# Patient Record
Sex: FEMALE | Race: WHITE | Employment: OTHER | URBAN - NONMETROPOLITAN AREA
[De-identification: names, ages, dates, MRNs, and addresses within clinical notes are randomized per-mention and may not be internally consistent; named-entity substitution may affect disease eponyms.]

---

## 2017-04-26 ENCOUNTER — TELEPHONE (OUTPATIENT)
Dept: PULMONOLOGY | Age: 82
End: 2017-04-26

## 2017-05-16 RX ORDER — APIXABAN 5 MG/1
TABLET, FILM COATED ORAL
Qty: 180 TABLET | Refills: 0 | Status: SHIPPED | OUTPATIENT
Start: 2017-05-16 | End: 2017-08-14 | Stop reason: SDUPTHER

## 2017-06-29 ENCOUNTER — OFFICE VISIT (OUTPATIENT)
Dept: CARDIOLOGY | Age: 82
End: 2017-06-29

## 2017-06-29 ENCOUNTER — PROCEDURE VISIT (OUTPATIENT)
Dept: CARDIOLOGY | Age: 82
End: 2017-06-29

## 2017-06-29 VITALS
BODY MASS INDEX: 22.2 KG/M2 | SYSTOLIC BLOOD PRESSURE: 138 MMHG | HEIGHT: 64 IN | HEART RATE: 76 BPM | DIASTOLIC BLOOD PRESSURE: 60 MMHG | WEIGHT: 130 LBS

## 2017-06-29 DIAGNOSIS — Z95.0 PACEMAKER: ICD-10-CM

## 2017-06-29 DIAGNOSIS — I25.10 ASHD (ARTERIOSCLEROTIC HEART DISEASE): Primary | ICD-10-CM

## 2017-06-29 DIAGNOSIS — I25.10 CORONARY ARTERY DISEASE INVOLVING NATIVE CORONARY ARTERY OF NATIVE HEART WITHOUT ANGINA PECTORIS: ICD-10-CM

## 2017-06-29 DIAGNOSIS — I48.92 ATRIAL FLUTTER, PAROXYSMAL (HCC): ICD-10-CM

## 2017-06-29 DIAGNOSIS — Z95.0 PACEMAKER: Primary | ICD-10-CM

## 2017-06-29 PROCEDURE — 1090F PRES/ABSN URINE INCON ASSESS: CPT | Performed by: INTERNAL MEDICINE

## 2017-06-29 PROCEDURE — 1036F TOBACCO NON-USER: CPT | Performed by: INTERNAL MEDICINE

## 2017-06-29 PROCEDURE — 93280 PM DEVICE PROGR EVAL DUAL: CPT | Performed by: INTERNAL MEDICINE

## 2017-06-29 PROCEDURE — G8598 ASA/ANTIPLAT THER USED: HCPCS | Performed by: INTERNAL MEDICINE

## 2017-06-29 PROCEDURE — G8420 CALC BMI NORM PARAMETERS: HCPCS | Performed by: INTERNAL MEDICINE

## 2017-06-29 PROCEDURE — G8400 PT W/DXA NO RESULTS DOC: HCPCS | Performed by: INTERNAL MEDICINE

## 2017-06-29 PROCEDURE — 99213 OFFICE O/P EST LOW 20 MIN: CPT | Performed by: INTERNAL MEDICINE

## 2017-06-29 PROCEDURE — 1123F ACP DISCUSS/DSCN MKR DOCD: CPT | Performed by: INTERNAL MEDICINE

## 2017-06-29 PROCEDURE — 4040F PNEUMOC VAC/ADMIN/RCVD: CPT | Performed by: INTERNAL MEDICINE

## 2017-06-29 PROCEDURE — G8427 DOCREV CUR MEDS BY ELIG CLIN: HCPCS | Performed by: INTERNAL MEDICINE

## 2017-08-02 ENCOUNTER — OFFICE VISIT (OUTPATIENT)
Dept: PULMONOLOGY | Age: 82
End: 2017-08-02
Payer: MEDICARE

## 2017-08-02 VITALS
BODY MASS INDEX: 22.36 KG/M2 | OXYGEN SATURATION: 97 % | HEART RATE: 78 BPM | SYSTOLIC BLOOD PRESSURE: 122 MMHG | HEIGHT: 64 IN | DIASTOLIC BLOOD PRESSURE: 68 MMHG | TEMPERATURE: 97.9 F | WEIGHT: 131 LBS

## 2017-08-02 DIAGNOSIS — J47.9 BRONCHIECTASIS WITHOUT COMPLICATION (HCC): Primary | ICD-10-CM

## 2017-08-02 PROCEDURE — 99205 OFFICE O/P NEW HI 60 MIN: CPT | Performed by: INTERNAL MEDICINE

## 2017-08-02 PROCEDURE — 1090F PRES/ABSN URINE INCON ASSESS: CPT | Performed by: INTERNAL MEDICINE

## 2017-08-02 PROCEDURE — G8420 CALC BMI NORM PARAMETERS: HCPCS | Performed by: INTERNAL MEDICINE

## 2017-08-02 PROCEDURE — 1123F ACP DISCUSS/DSCN MKR DOCD: CPT | Performed by: INTERNAL MEDICINE

## 2017-08-02 PROCEDURE — G8598 ASA/ANTIPLAT THER USED: HCPCS | Performed by: INTERNAL MEDICINE

## 2017-08-02 PROCEDURE — 4040F PNEUMOC VAC/ADMIN/RCVD: CPT | Performed by: INTERNAL MEDICINE

## 2017-08-02 PROCEDURE — G8400 PT W/DXA NO RESULTS DOC: HCPCS | Performed by: INTERNAL MEDICINE

## 2017-08-02 PROCEDURE — 1036F TOBACCO NON-USER: CPT | Performed by: INTERNAL MEDICINE

## 2017-08-02 PROCEDURE — G8427 DOCREV CUR MEDS BY ELIG CLIN: HCPCS | Performed by: INTERNAL MEDICINE

## 2017-08-02 ASSESSMENT — ENCOUNTER SYMPTOMS
CHEST TIGHTNESS: 0
ABDOMINAL DISTENTION: 0
SHORTNESS OF BREATH: 1
SPUTUM PRODUCTION: 1
COUGH: 1
HEMOPTYSIS: 0
WHEEZING: 0

## 2017-08-07 ENCOUNTER — TELEPHONE (OUTPATIENT)
Dept: PULMONOLOGY | Age: 82
End: 2017-08-07

## 2017-08-14 RX ORDER — APIXABAN 5 MG/1
TABLET, FILM COATED ORAL
Qty: 180 TABLET | Refills: 1 | Status: SHIPPED | OUTPATIENT
Start: 2017-08-14 | End: 2018-02-10 | Stop reason: SDUPTHER

## 2017-09-08 ENCOUNTER — TELEPHONE (OUTPATIENT)
Dept: PULMONOLOGY | Age: 82
End: 2017-09-08

## 2017-11-01 ENCOUNTER — TELEPHONE (OUTPATIENT)
Dept: CARDIOLOGY CLINIC | Age: 82
End: 2017-11-01

## 2017-11-01 DIAGNOSIS — R51.9 WORSENING HEADACHES: Primary | ICD-10-CM

## 2017-12-06 ENCOUNTER — OFFICE VISIT (OUTPATIENT)
Dept: PULMONOLOGY | Age: 82
End: 2017-12-06
Payer: MEDICARE

## 2017-12-06 ENCOUNTER — TELEPHONE (OUTPATIENT)
Dept: PULMONOLOGY | Age: 82
End: 2017-12-06

## 2017-12-06 VITALS
OXYGEN SATURATION: 97 % | WEIGHT: 134.6 LBS | BODY MASS INDEX: 22.98 KG/M2 | SYSTOLIC BLOOD PRESSURE: 118 MMHG | HEIGHT: 64 IN | TEMPERATURE: 98 F | DIASTOLIC BLOOD PRESSURE: 72 MMHG | HEART RATE: 80 BPM

## 2017-12-06 DIAGNOSIS — I27.20 PULMONARY HYPERTENSION (HCC): ICD-10-CM

## 2017-12-06 DIAGNOSIS — J42 CHRONIC BRONCHITIS, UNSPECIFIED CHRONIC BRONCHITIS TYPE (HCC): ICD-10-CM

## 2017-12-06 DIAGNOSIS — G25.81 RLS (RESTLESS LEGS SYNDROME): ICD-10-CM

## 2017-12-06 DIAGNOSIS — J43.9 PULMONARY EMPHYSEMA, UNSPECIFIED EMPHYSEMA TYPE (HCC): Primary | ICD-10-CM

## 2017-12-06 DIAGNOSIS — I49.5 SSS (SICK SINUS SYNDROME) (HCC): ICD-10-CM

## 2017-12-06 DIAGNOSIS — I25.10 CORONARY ARTERY DISEASE INVOLVING NATIVE HEART WITHOUT ANGINA PECTORIS, UNSPECIFIED VESSEL OR LESION TYPE: ICD-10-CM

## 2017-12-06 DIAGNOSIS — J47.9 BRONCHIECTASIS WITHOUT COMPLICATION (HCC): Primary | ICD-10-CM

## 2017-12-06 PROCEDURE — G0009 ADMIN PNEUMOCOCCAL VACCINE: HCPCS | Performed by: NURSE PRACTITIONER

## 2017-12-06 PROCEDURE — 99214 OFFICE O/P EST MOD 30 MIN: CPT | Performed by: NURSE PRACTITIONER

## 2017-12-06 PROCEDURE — 90670 PCV13 VACCINE IM: CPT | Performed by: NURSE PRACTITIONER

## 2017-12-06 RX ORDER — IPRATROPIUM BROMIDE AND ALBUTEROL SULFATE 2.5; .5 MG/3ML; MG/3ML
1 SOLUTION RESPIRATORY (INHALATION) EVERY 6 HOURS PRN
Qty: 360 ML | Refills: 5 | Status: SHIPPED | OUTPATIENT
Start: 2017-12-06 | End: 2019-01-29 | Stop reason: ALTCHOICE

## 2017-12-06 RX ORDER — IPRATROPIUM BROMIDE AND ALBUTEROL SULFATE 2.5; .5 MG/3ML; MG/3ML
1 SOLUTION RESPIRATORY (INHALATION) EVERY 6 HOURS PRN
Qty: 360 ML | Refills: 5 | Status: SHIPPED
Start: 2017-12-06 | End: 2017-12-06 | Stop reason: SDUPTHER

## 2017-12-06 NOTE — PROGRESS NOTES
After obtaining consent, and per orders of Dr. Christopher Wright  injection of Prevnar 13 given in left deltoid by [facility]. Patient instructed to remain in clinic for 20 minutes afterwards, and to report any adverse reaction to me immediately.

## 2017-12-06 NOTE — PROGRESS NOTES
[facility]    Patient: Cordell Aaron, 80 y.o.   : 1935  2017    Pt of Dr. Jazmin Jeffrey   Patient presents with    Shortness of Breath     bronchiectasis 4 month f/u no tests         HPI  Michelle Chandler is here for follow up for bronchiectasis. She was previously seen by Dr. Mynor Kraus in August with recommendations to start [place] with continuation of Pulmicort and Combivent PRN. She had great benefit from [place] but could not continue because of out of pocket expense. She was placed on Advair and stopped Pulmicort by her PCP and continues to use Combivent once per day. She has no response with Advair, but notable improvement in her SOB with Combivent. She does continue to have daily cough, productive of clear sputum. She has required no ATB coverage. Denies fever/chills, hemoptysis, night sweats. She has not required home oxygen. She is not up to date on pneumonia vaccination and can not take flu shot because of egg allergy. Progress History:   Since last visit any new medical issues? No  New ER or hospitlal visits? No  Any new or changes in medicines? No  Using inhalers? Yes   Are they helpful? Yes Combivent is, Advair is not    Past Medical hx   PMH:  Past Medical History:   Diagnosis Date    Anxiety     Bronchiectasis (HCC)     Chronic obstructive lung disease     Depression     Epigastric discomfort     Lower substernal and epigastric discomfort, etiology uncertain (probably not cardiac.)     Exertional dyspnea     Patient has considerable exertional dyspnea from her obstructive lung disease.  GERD (gastroesophageal reflux disease)     Hernia     History of pneumonia     History of freqeunt episodes of pneumonia as a child.     Irregular heart beat     Joint pain     Pacemaker     S/P dual-chamber [place] pacemaker implantation (symptomatic bradycardia probably related to AV block.)     Restless legs syndrome     SSS (sick sinus syndrome) ([facility] [address]      SURGICAL HISTORY:  Past Surgical History:   Procedure Laterality Date    CARDIAC PACEMAKER PLACEMENT  2001    CARDIAC PACEMAKER PLACEMENT  4 13 2010    Dual-chamber pulse generator removal. Attempted extraction of atrial and ventricular leads. Insertion of new ventricular lead. Insertion of new dual-chamber pulse generator (atrial lead capped.)     CARDIOVASCULAR STRESS TEST  8 23 2010    Gated study showed normal LV function. EF is 64%. Iosotope scan in short axis showed homogeneous uptake. Mild anterior wall attenuation in breasts but no reversibility to suggest ischemia. TID was normal at 0.90 and ischemic score was 0. No ischemia or infarction.  CORONARY ANGIOPLASTY WITH STENT PLACEMENT  11/2011    Dr. Federico Pagan CATH LAB PROCEDURE  8 02 2006    It is right dominant coronary supply. LV was performed. The EF estimated is 60% with mild inferior wall hypokinesis. LV pressure is 139/6 mmHg, LVEDP 23 mmHg, aortic pressure of 138/71 mmHg. There was no gradient across the aortic valve. There was a suggestion of increased wall thickness of the ventricle.  JOINT REPLACEMENT      left knee    TRANSTHORACIC ECHOCARDIOGRAM  8 01 2011    Size was normal. Systolic function was normal. EF was estimated in the range of 55-65%. There were no regional wall motion abnormalities. Wall thickness was normal. Doppler - LV diastolic function parameters were normal. RV was mildly dilated. There was mild mitral valve annular calcification and mild MR.      SOCIAL HISTORY:  Social History   Substance Use Topics    Smoking status: Never Smoker    Smokeless tobacco: Never Used    Alcohol use Yes      Comment: Patient states, \"she drinks beer. \" Tuesday     ALLERGIES:  Allergies   Allergen Reactions    Eggs Or Egg-Derived Products     Quinine Derivatives      FAMILY HISTORY:  Family History   Problem Relation Age of Onset    Cancer Mother      CURRENT MEDICATIONS:  Current Outpatient Prescriptions   Medication Sig Dispense Refill    albuterol-ipratropium (COMBIVENT RESPIMAT)  MCG/ACT AERS inhaler Inhale 1 puff into the lungs every 6 hours as needed 3 Inhaler 3    ipratropium-albuterol (DUONEB) 0.5-2.5 (3) MG/3ML SOLN nebulizer solution Inhale 3 mLs into the lungs every 6 hours as needed for Shortness of Breath 360 mL 5    Glycopyrrolate-Formoterol 9-4.8 MCG/ACT AERO Inhale 2 puffs into the lungs 2 times daily 1 Inhaler 11    ELIQUIS 5 MG TABS tablet TAKE 1 TABLET TWICE A  tablet 1    diclofenac (VOLTAREN) 75 MG EC tablet Take 75 mg by mouth 2 times daily.  sertraline (ZOLOFT) 100 MG tablet Take 100 mg by mouth daily.  amitriptyline (ELAVIL) 25 MG tablet Take 1 tablet by mouth nightly. 30 tablet 3    clopidogrel (PLAVIX) 75 MG tablet Take 75 mg by mouth daily.  ropinirole (REQUIP) 0.25 MG tablet Take 1 mg by mouth nightly.  Multiple Vitamins-Minerals (PRESERVISION AREDS PO) Take  by mouth daily.  aspirin 81 MG EC tablet Take 81 mg by mouth daily.  furosemide (LASIX) 40 MG tablet Take 40 mg by mouth daily as needed. No current facility-administered medications for this visit. Alfredito Eligio ROS   General/Constitutional: No recent loss of weight or appetite changes. No fever or chills. HENT: Negative. Eyes: Negative. Upper respiratory tract: No nasal stuffiness or post nasal drip. Lower respiratory tract/ lungs: Frequent cough with moderate clear sputum production. No hemoptysis. Cardiovascular: No palpitations or chest pain. Gastrointestinal: No nausea or vomiting. Neurological: No focal neurologiacal weakness. Extremities: No edema. Musculoskeletal: No complaints. Genitourinary: No complaints. Hematological: Negative. Psychiatric/Behavioral: Negative. Skin: No itching.      Physical exam   /72   Pulse 80   Temp 98 °F (36.7 °C)   Ht 5' 4\" (1.626 m)   Wt 134 lb 9.6 oz (61.1 kg)   SpO2 97% Comment: room air at rest  BMI 23.10 kg/m²      Physical Exam   Constitutional: Patient appears moderately built and moderately nourished. In no acute distress. Head: Normocephalic and atraumatic. Neck: Neck supple. No tracheal deviation present. Cardiovascular: Regular rate, regular rhythm, S1 and S2 with no murmur. No peripheral edema. Pulmonary/Chest: Normal effort with diminished breath sounds throughout, fine rales to right base. No stridor. No respiratory distress. Abdominal: Soft. No distension or tenderness to palpation. Musculoskeletal: Moves all extremities  Neurological: Patient is alert and oriented to person, place, and time. No focal deficits. Skin: Warm and dry. No clubbing or cyanosis. Test results   Lung Nodule Screening     [] Qualifies    [x] Does not qualify   [] Declined    [] Completed     Echo 7/10/17     Ejection fraction is visually estimated at 08%.   Systolic function was normal.   Normal right ventricular size and function.   Right ventricular systolic pressure of 44 mm Hg consistent with mild   pulmonary hypertension     CXR 1/30/17     1. There is stable left infrahilar/left basilar bronchiectasis and chronic infiltrate. 2. There is no new consolidation or new infiltrate. 3. There is stable mild pleural reaction at the left lateral costophrenic angle and new mild pleural reaction at the right lateral costophrenic angle. 4. There is no pulmonary vascular congestion.                 7/2012 - mild bronchiectasis                        Assessment     1. Bronchiectasis without complication ([address]     2. RLS (restless legs syndrome)     3. Chronic bronchitis, unspecified chronic bronchitis type ([address]     4. Pulmonary hypertension      Mild   5. Coronary artery disease involving native heart without angina pectoris, unspecified vessel or lesion type     6. SSS (sick sinus syndrome) ([address]           Plan   She did well with Bevespi, will resume.  Will provide samples and arrange for medication assistance through [facility]) program  Stop Advair  Consider adding ICS at next visit if needed  Give Prevnar 13  She may need repeat PPSV23 in 1 year      Will see John Hyman back in: 6 months    Electronically signed by Jena Barr CNP on 12/6/2017 at 1:39 PM'

## 2017-12-07 ENCOUNTER — TELEPHONE (OUTPATIENT)
Dept: PULMONOLOGY | Age: 82
End: 2017-12-07

## 2017-12-07 NOTE — TELEPHONE ENCOUNTER
Called and LM for Donnis Simmonds per your request  informing her that the pt is in need of medication assistance for Bevespi, and to COB if there is anything else that she needs regarding this to best assist the pt.  Order for Duoneb faxed to [facility] on 12/6/17 per pt's request.

## 2018-02-12 RX ORDER — APIXABAN 5 MG/1
TABLET, FILM COATED ORAL
Qty: 180 TABLET | Refills: 0 | Status: SHIPPED | OUTPATIENT
Start: 2018-02-12 | End: 2018-05-02 | Stop reason: SDUPTHER

## 2018-05-02 ENCOUNTER — NURSE ONLY (OUTPATIENT)
Dept: CARDIOLOGY CLINIC | Age: 83
End: 2018-05-02
Payer: MEDICARE

## 2018-05-02 ENCOUNTER — OFFICE VISIT (OUTPATIENT)
Dept: CARDIOLOGY CLINIC | Age: 83
End: 2018-05-02
Payer: MEDICARE

## 2018-05-02 VITALS
BODY MASS INDEX: 22.79 KG/M2 | WEIGHT: 128.6 LBS | HEART RATE: 85 BPM | SYSTOLIC BLOOD PRESSURE: 142 MMHG | DIASTOLIC BLOOD PRESSURE: 62 MMHG | HEIGHT: 63 IN

## 2018-05-02 DIAGNOSIS — R06.09 DOE (DYSPNEA ON EXERTION): ICD-10-CM

## 2018-05-02 DIAGNOSIS — Z95.0 PACEMAKER: Primary | ICD-10-CM

## 2018-05-02 DIAGNOSIS — I48.92 ATRIAL FLUTTER, PAROXYSMAL (HCC): Primary | ICD-10-CM

## 2018-05-02 DIAGNOSIS — I25.10 CORONARY ARTERY DISEASE INVOLVING NATIVE CORONARY ARTERY OF NATIVE HEART WITHOUT ANGINA PECTORIS: ICD-10-CM

## 2018-05-02 PROCEDURE — 1090F PRES/ABSN URINE INCON ASSESS: CPT | Performed by: INTERNAL MEDICINE

## 2018-05-02 PROCEDURE — 93000 ELECTROCARDIOGRAM COMPLETE: CPT | Performed by: INTERNAL MEDICINE

## 2018-05-02 PROCEDURE — G8428 CUR MEDS NOT DOCUMENT: HCPCS | Performed by: INTERNAL MEDICINE

## 2018-05-02 PROCEDURE — 93280 PM DEVICE PROGR EVAL DUAL: CPT | Performed by: INTERNAL MEDICINE

## 2018-05-02 PROCEDURE — G8400 PT W/DXA NO RESULTS DOC: HCPCS | Performed by: INTERNAL MEDICINE

## 2018-05-02 PROCEDURE — G8598 ASA/ANTIPLAT THER USED: HCPCS | Performed by: INTERNAL MEDICINE

## 2018-05-02 PROCEDURE — 1123F ACP DISCUSS/DSCN MKR DOCD: CPT | Performed by: INTERNAL MEDICINE

## 2018-05-02 PROCEDURE — 99214 OFFICE O/P EST MOD 30 MIN: CPT | Performed by: INTERNAL MEDICINE

## 2018-05-02 PROCEDURE — G8420 CALC BMI NORM PARAMETERS: HCPCS | Performed by: INTERNAL MEDICINE

## 2018-05-02 PROCEDURE — 4040F PNEUMOC VAC/ADMIN/RCVD: CPT | Performed by: INTERNAL MEDICINE

## 2018-05-02 PROCEDURE — 1036F TOBACCO NON-USER: CPT | Performed by: INTERNAL MEDICINE

## 2018-05-02 RX ORDER — AMLODIPINE BESYLATE 5 MG/1
5 TABLET ORAL DAILY
Qty: 30 TABLET | Refills: 3 | Status: ON HOLD | OUTPATIENT
Start: 2018-05-02 | End: 2018-05-13

## 2018-05-02 RX ORDER — OMEPRAZOLE 20 MG/1
20 CAPSULE, DELAYED RELEASE ORAL DAILY
Status: ON HOLD | COMMUNITY
End: 2018-05-13 | Stop reason: HOSPADM

## 2018-05-02 ASSESSMENT — ENCOUNTER SYMPTOMS
SORE THROAT: 0
NAUSEA: 0
EYE DISCHARGE: 0
EYE REDNESS: 0
BLOOD IN STOOL: 0
APNEA: 0
SINUS PRESSURE: 0
CONSTIPATION: 0
EYE PAIN: 0
EYE ITCHING: 0
ABDOMINAL PAIN: 0
DIARRHEA: 0
ABDOMINAL DISTENTION: 0
SHORTNESS OF BREATH: 1
COUGH: 0
BACK PAIN: 0
CHEST TIGHTNESS: 0

## 2018-05-10 ENCOUNTER — APPOINTMENT (OUTPATIENT)
Dept: GENERAL RADIOLOGY | Age: 83
DRG: 247 | End: 2018-05-10
Payer: MEDICARE

## 2018-05-10 ENCOUNTER — HOSPITAL ENCOUNTER (INPATIENT)
Age: 83
LOS: 3 days | Discharge: HOME | DRG: 247 | End: 2018-05-13
Attending: INTERNAL MEDICINE | Admitting: INTERNAL MEDICINE
Payer: MEDICARE

## 2018-05-10 DIAGNOSIS — R77.8 ELEVATED TROPONIN: ICD-10-CM

## 2018-05-10 DIAGNOSIS — I20.0 UNSTABLE ANGINA PECTORIS (HCC): Primary | ICD-10-CM

## 2018-05-10 PROBLEM — I25.10 ASHD (ARTERIOSCLEROTIC HEART DISEASE): Status: ACTIVE | Noted: 2018-05-10

## 2018-05-10 LAB
ALBUMIN SERPL-MCNC: 3.9 G/DL (ref 3.5–5.1)
ALP BLD-CCNC: 64 U/L (ref 38–126)
ALT SERPL-CCNC: 11 U/L (ref 11–66)
ANION GAP SERPL CALCULATED.3IONS-SCNC: 11 MEQ/L (ref 8–16)
ANISOCYTOSIS: ABNORMAL
AST SERPL-CCNC: 29 U/L (ref 5–40)
BASOPHILS # BLD: 0.2 %
BASOPHILS ABSOLUTE: 0 THOU/MM3 (ref 0–0.1)
BILIRUB SERPL-MCNC: 0.3 MG/DL (ref 0.3–1.2)
BUN BLDV-MCNC: 21 MG/DL (ref 7–22)
CALCIUM SERPL-MCNC: 9.4 MG/DL (ref 8.5–10.5)
CHLORIDE BLD-SCNC: 99 MEQ/L (ref 98–111)
CO2: 28 MEQ/L (ref 23–33)
CREAT SERPL-MCNC: 0.7 MG/DL (ref 0.4–1.2)
EKG ATRIAL RATE: 78 BPM
EKG ATRIAL RATE: 78 BPM
EKG P AXIS: 71 DEGREES
EKG P AXIS: 73 DEGREES
EKG P-R INTERVAL: 174 MS
EKG P-R INTERVAL: 178 MS
EKG Q-T INTERVAL: 458 MS
EKG Q-T INTERVAL: 460 MS
EKG QRS DURATION: 152 MS
EKG QRS DURATION: 154 MS
EKG QTC CALCULATION (BAZETT): 522 MS
EKG QTC CALCULATION (BAZETT): 524 MS
EKG R AXIS: -30 DEGREES
EKG R AXIS: -34 DEGREES
EKG T AXIS: 110 DEGREES
EKG T AXIS: 133 DEGREES
EKG VENTRICULAR RATE: 78 BPM
EKG VENTRICULAR RATE: 78 BPM
EOSINOPHIL # BLD: 2.3 %
EOSINOPHILS ABSOLUTE: 0.2 THOU/MM3 (ref 0–0.4)
GFR SERPL CREATININE-BSD FRML MDRD: 80 ML/MIN/1.73M2
GLUCOSE BLD-MCNC: 176 MG/DL (ref 70–108)
GLUCOSE BLD-MCNC: 97 MG/DL (ref 70–108)
HCT VFR BLD CALC: 38.8 % (ref 37–47)
HEMOGLOBIN: 13 GM/DL (ref 12–16)
LYMPHOCYTES # BLD: 12 %
LYMPHOCYTES ABSOLUTE: 1.3 THOU/MM3 (ref 1–4.8)
MCH RBC QN AUTO: 30.6 PG (ref 27–31)
MCHC RBC AUTO-ENTMCNC: 33.4 GM/DL (ref 33–37)
MCV RBC AUTO: 91.6 FL (ref 81–99)
MONOCYTES # BLD: 7.2 %
MONOCYTES ABSOLUTE: 0.8 THOU/MM3 (ref 0.4–1.3)
NUCLEATED RED BLOOD CELLS: 0 /100 WBC
OSMOLALITY CALCULATION: 278.6 MOSMOL/KG (ref 275–300)
PDW BLD-RTO: 15 % (ref 11.5–14.5)
PLATELET # BLD: 261 THOU/MM3 (ref 130–400)
PMV BLD AUTO: 7.8 FL (ref 7.4–10.4)
POTASSIUM SERPL-SCNC: 4 MEQ/L (ref 3.5–5.2)
RBC # BLD: 4.24 MILL/MM3 (ref 4.2–5.4)
SEG NEUTROPHILS: 78.3 %
SEGMENTED NEUTROPHILS ABSOLUTE COUNT: 8.4 THOU/MM3 (ref 1.8–7.7)
SODIUM BLD-SCNC: 138 MEQ/L (ref 135–145)
TOTAL PROTEIN: 7.4 G/DL (ref 6.1–8)
TROPONIN T: 0.12 NG/ML
TROPONIN T: 0.16 NG/ML
TROPONIN T: 0.18 NG/ML
WBC # BLD: 10.7 THOU/MM3 (ref 4.8–10.8)

## 2018-05-10 PROCEDURE — 71046 X-RAY EXAM CHEST 2 VIEWS: CPT

## 2018-05-10 PROCEDURE — 2580000003 HC RX 258: Performed by: INTERNAL MEDICINE

## 2018-05-10 PROCEDURE — 99285 EMERGENCY DEPT VISIT HI MDM: CPT

## 2018-05-10 PROCEDURE — 6370000000 HC RX 637 (ALT 250 FOR IP): Performed by: PHYSICIAN ASSISTANT

## 2018-05-10 PROCEDURE — 86850 RBC ANTIBODY SCREEN: CPT

## 2018-05-10 PROCEDURE — 85025 COMPLETE CBC W/AUTO DIFF WBC: CPT

## 2018-05-10 PROCEDURE — 80053 COMPREHEN METABOLIC PANEL: CPT

## 2018-05-10 PROCEDURE — 84484 ASSAY OF TROPONIN QUANT: CPT

## 2018-05-10 PROCEDURE — 99223 1ST HOSP IP/OBS HIGH 75: CPT | Performed by: INTERNAL MEDICINE

## 2018-05-10 PROCEDURE — 2500000003 HC RX 250 WO HCPCS: Performed by: INTERNAL MEDICINE

## 2018-05-10 PROCEDURE — 86901 BLOOD TYPING SEROLOGIC RH(D): CPT

## 2018-05-10 PROCEDURE — 6370000000 HC RX 637 (ALT 250 FOR IP): Performed by: INTERNAL MEDICINE

## 2018-05-10 PROCEDURE — 93010 ELECTROCARDIOGRAM REPORT: CPT | Performed by: INTERNAL MEDICINE

## 2018-05-10 PROCEDURE — 36415 COLL VENOUS BLD VENIPUNCTURE: CPT

## 2018-05-10 PROCEDURE — 86900 BLOOD TYPING SEROLOGIC ABO: CPT

## 2018-05-10 PROCEDURE — 2140000000 HC CCU INTERMEDIATE R&B

## 2018-05-10 PROCEDURE — 93005 ELECTROCARDIOGRAM TRACING: CPT | Performed by: PHYSICIAN ASSISTANT

## 2018-05-10 PROCEDURE — 93005 ELECTROCARDIOGRAM TRACING: CPT | Performed by: INTERNAL MEDICINE

## 2018-05-10 PROCEDURE — 6360000002 HC RX W HCPCS: Performed by: INTERNAL MEDICINE

## 2018-05-10 PROCEDURE — 82948 REAGENT STRIP/BLOOD GLUCOSE: CPT

## 2018-05-10 RX ORDER — AMLODIPINE BESYLATE 5 MG/1
5 TABLET ORAL DAILY
Status: DISCONTINUED | OUTPATIENT
Start: 2018-05-10 | End: 2018-05-13 | Stop reason: HOSPADM

## 2018-05-10 RX ORDER — ASPIRIN 81 MG/1
324 TABLET, CHEWABLE ORAL ONCE
Status: COMPLETED | OUTPATIENT
Start: 2018-05-10 | End: 2018-05-10

## 2018-05-10 RX ORDER — ASPIRIN 81 MG/1
81 TABLET, CHEWABLE ORAL DAILY
Status: DISCONTINUED | OUTPATIENT
Start: 2018-05-11 | End: 2018-05-10

## 2018-05-10 RX ORDER — ASPIRIN 81 MG/1
81 TABLET ORAL DAILY
Status: DISCONTINUED | OUTPATIENT
Start: 2018-05-10 | End: 2018-05-13 | Stop reason: HOSPADM

## 2018-05-10 RX ORDER — NITROGLYCERIN 0.4 MG/1
0.4 TABLET SUBLINGUAL EVERY 5 MIN PRN
Status: DISCONTINUED | OUTPATIENT
Start: 2018-05-10 | End: 2018-05-11

## 2018-05-10 RX ORDER — SODIUM CHLORIDE 9 MG/ML
INJECTION, SOLUTION INTRAVENOUS CONTINUOUS
Status: DISCONTINUED | OUTPATIENT
Start: 2018-05-10 | End: 2018-05-11

## 2018-05-10 RX ORDER — IPRATROPIUM BROMIDE AND ALBUTEROL SULFATE 2.5; .5 MG/3ML; MG/3ML
1 SOLUTION RESPIRATORY (INHALATION) EVERY 6 HOURS PRN
Status: DISCONTINUED | OUTPATIENT
Start: 2018-05-10 | End: 2018-05-13 | Stop reason: HOSPADM

## 2018-05-10 RX ORDER — MORPHINE SULFATE 2 MG/ML
2 INJECTION, SOLUTION INTRAMUSCULAR; INTRAVENOUS EVERY 4 HOURS PRN
Status: DISCONTINUED | OUTPATIENT
Start: 2018-05-10 | End: 2018-05-13 | Stop reason: HOSPADM

## 2018-05-10 RX ORDER — SODIUM CHLORIDE 0.9 % (FLUSH) 0.9 %
10 SYRINGE (ML) INJECTION PRN
Status: DISCONTINUED | OUTPATIENT
Start: 2018-05-10 | End: 2018-05-13 | Stop reason: HOSPADM

## 2018-05-10 RX ORDER — NITROGLYCERIN 20 MG/100ML
5 INJECTION INTRAVENOUS CONTINUOUS
Status: DISCONTINUED | OUTPATIENT
Start: 2018-05-10 | End: 2018-05-13 | Stop reason: HOSPADM

## 2018-05-10 RX ORDER — SODIUM CHLORIDE 0.9 % (FLUSH) 0.9 %
10 SYRINGE (ML) INJECTION EVERY 12 HOURS SCHEDULED
Status: DISCONTINUED | OUTPATIENT
Start: 2018-05-10 | End: 2018-05-13 | Stop reason: HOSPADM

## 2018-05-10 RX ORDER — PANTOPRAZOLE SODIUM 40 MG/1
40 TABLET, DELAYED RELEASE ORAL
Status: DISCONTINUED | OUTPATIENT
Start: 2018-05-11 | End: 2018-05-11

## 2018-05-10 RX ORDER — ROPINIROLE 1 MG/1
1 TABLET, FILM COATED ORAL NIGHTLY
Status: DISCONTINUED | OUTPATIENT
Start: 2018-05-10 | End: 2018-05-13 | Stop reason: HOSPADM

## 2018-05-10 RX ORDER — ONDANSETRON 2 MG/ML
4 INJECTION INTRAMUSCULAR; INTRAVENOUS EVERY 6 HOURS PRN
Status: DISCONTINUED | OUTPATIENT
Start: 2018-05-10 | End: 2018-05-11

## 2018-05-10 RX ORDER — HYDROCODONE BITARTRATE AND ACETAMINOPHEN 5; 325 MG/1; MG/1
1 TABLET ORAL EVERY 4 HOURS PRN
Status: DISCONTINUED | OUTPATIENT
Start: 2018-05-10 | End: 2018-05-13 | Stop reason: HOSPADM

## 2018-05-10 RX ORDER — ATORVASTATIN CALCIUM 40 MG/1
40 TABLET, FILM COATED ORAL NIGHTLY
Status: DISCONTINUED | OUTPATIENT
Start: 2018-05-10 | End: 2018-05-13 | Stop reason: HOSPADM

## 2018-05-10 RX ORDER — ACETAMINOPHEN 325 MG/1
650 TABLET ORAL EVERY 4 HOURS PRN
Status: DISCONTINUED | OUTPATIENT
Start: 2018-05-10 | End: 2018-05-11

## 2018-05-10 RX ORDER — CIPROFLOXACIN 750 MG/1
750 TABLET, FILM COATED ORAL 2 TIMES DAILY
COMMUNITY

## 2018-05-10 RX ORDER — SERTRALINE HYDROCHLORIDE 100 MG/1
100 TABLET, FILM COATED ORAL DAILY
Status: DISCONTINUED | OUTPATIENT
Start: 2018-05-10 | End: 2018-05-13 | Stop reason: HOSPADM

## 2018-05-10 RX ORDER — HYDROCODONE BITARTRATE AND ACETAMINOPHEN 5; 325 MG/1; MG/1
2 TABLET ORAL EVERY 4 HOURS PRN
Status: DISCONTINUED | OUTPATIENT
Start: 2018-05-10 | End: 2018-05-13 | Stop reason: HOSPADM

## 2018-05-10 RX ADMIN — ATORVASTATIN CALCIUM 40 MG: 40 TABLET, FILM COATED ORAL at 20:17

## 2018-05-10 RX ADMIN — NITROGLYCERIN 0.4 MG: 0.4 TABLET SUBLINGUAL at 08:09

## 2018-05-10 RX ADMIN — ROPINIROLE HYDROCHLORIDE 1 MG: 1 TABLET, FILM COATED ORAL at 20:17

## 2018-05-10 RX ADMIN — METOPROLOL TARTRATE 25 MG: 25 TABLET ORAL at 15:23

## 2018-05-10 RX ADMIN — ASPIRIN 81 MG 324 MG: 81 TABLET ORAL at 08:06

## 2018-05-10 RX ADMIN — AMLODIPINE BESYLATE 5 MG: 5 TABLET ORAL at 15:23

## 2018-05-10 RX ADMIN — SERTRALINE 100 MG: 100 TABLET, FILM COATED ORAL at 15:23

## 2018-05-10 RX ADMIN — ENOXAPARIN SODIUM 60 MG: 60 INJECTION SUBCUTANEOUS at 15:23

## 2018-05-10 RX ADMIN — METOPROLOL TARTRATE 25 MG: 25 TABLET ORAL at 20:17

## 2018-05-10 RX ADMIN — ONDANSETRON 4 MG: 2 INJECTION INTRAMUSCULAR; INTRAVENOUS at 23:05

## 2018-05-10 RX ADMIN — SODIUM CHLORIDE: 9 INJECTION, SOLUTION INTRAVENOUS at 15:25

## 2018-05-10 RX ADMIN — ASPIRIN 81 MG: 81 TABLET, COATED ORAL at 15:23

## 2018-05-10 RX ADMIN — NITROGLYCERIN 5 MCG: 20 INJECTION INTRAVENOUS at 15:24

## 2018-05-10 RX ADMIN — NITROGLYCERIN 0.5 INCH: 20 OINTMENT TOPICAL at 09:13

## 2018-05-10 ASSESSMENT — PAIN DESCRIPTION - PAIN TYPE
TYPE: ACUTE PAIN

## 2018-05-10 ASSESSMENT — ENCOUNTER SYMPTOMS
EYE DISCHARGE: 0
SORE THROAT: 0
ABDOMINAL PAIN: 0
SHORTNESS OF BREATH: 1
BACK PAIN: 0
DIARRHEA: 0
EYE PAIN: 0
COUGH: 0
NAUSEA: 0
WHEEZING: 0
VOMITING: 0
RHINORRHEA: 0

## 2018-05-10 ASSESSMENT — PAIN DESCRIPTION - LOCATION
LOCATION: JAW
LOCATION: CHEST

## 2018-05-10 ASSESSMENT — PAIN SCALES - GENERAL
PAINLEVEL_OUTOF10: 5
PAINLEVEL_OUTOF10: 2
PAINLEVEL_OUTOF10: 5
PAINLEVEL_OUTOF10: 0
PAINLEVEL_OUTOF10: 10

## 2018-05-11 LAB
ABO: NORMAL
ANTIBODY SCREEN: NORMAL
LV EF: 50 %
LV EF: 53 %
LVEF MODALITY: NORMAL
LVEF MODALITY: NORMAL
MRSA SCREEN RT-PCR: NEGATIVE
RH FACTOR: NORMAL

## 2018-05-11 PROCEDURE — 6370000000 HC RX 637 (ALT 250 FOR IP)

## 2018-05-11 PROCEDURE — C1874 STENT, COATED/COV W/DEL SYS: HCPCS

## 2018-05-11 PROCEDURE — 92920 PRQ TRLUML C ANGIOP 1ART&/BR: CPT | Performed by: INTERNAL MEDICINE

## 2018-05-11 PROCEDURE — C9113 INJ PANTOPRAZOLE SODIUM, VIA: HCPCS | Performed by: INTERNAL MEDICINE

## 2018-05-11 PROCEDURE — 027135Z DILATION OF CORONARY ARTERY, TWO ARTERIES WITH TWO DRUG-ELUTING INTRALUMINAL DEVICES, PERCUTANEOUS APPROACH: ICD-10-PCS | Performed by: INTERNAL MEDICINE

## 2018-05-11 PROCEDURE — 87641 MR-STAPH DNA AMP PROBE: CPT

## 2018-05-11 PROCEDURE — 6360000002 HC RX W HCPCS: Performed by: INTERNAL MEDICINE

## 2018-05-11 PROCEDURE — C9600 PERC DRUG-EL COR STENT SING: HCPCS | Performed by: INTERNAL MEDICINE

## 2018-05-11 PROCEDURE — 93306 TTE W/DOPPLER COMPLETE: CPT

## 2018-05-11 PROCEDURE — C1769 GUIDE WIRE: HCPCS

## 2018-05-11 PROCEDURE — 2100000000 HC CCU R&B

## 2018-05-11 PROCEDURE — C1894 INTRO/SHEATH, NON-LASER: HCPCS

## 2018-05-11 PROCEDURE — 2500000003 HC RX 250 WO HCPCS: Performed by: INTERNAL MEDICINE

## 2018-05-11 PROCEDURE — 92941 PRQ TRLML REVSC TOT OCCL AMI: CPT | Performed by: INTERNAL MEDICINE

## 2018-05-11 PROCEDURE — A6402 STERILE GAUZE <= 16 SQ IN: HCPCS

## 2018-05-11 PROCEDURE — 2720000010 HC SURG SUPPLY STERILE

## 2018-05-11 PROCEDURE — C1773 RET DEV, INSERTABLE: HCPCS

## 2018-05-11 PROCEDURE — 6370000000 HC RX 637 (ALT 250 FOR IP): Performed by: INTERNAL MEDICINE

## 2018-05-11 PROCEDURE — 4A023N7 MEASUREMENT OF CARDIAC SAMPLING AND PRESSURE, LEFT HEART, PERCUTANEOUS APPROACH: ICD-10-PCS | Performed by: INTERNAL MEDICINE

## 2018-05-11 PROCEDURE — 2580000003 HC RX 258: Performed by: INTERNAL MEDICINE

## 2018-05-11 PROCEDURE — 2780000010 HC IMPLANT OTHER

## 2018-05-11 PROCEDURE — 2500000003 HC RX 250 WO HCPCS

## 2018-05-11 PROCEDURE — 93458 L HRT ARTERY/VENTRICLE ANGIO: CPT | Performed by: INTERNAL MEDICINE

## 2018-05-11 PROCEDURE — 6360000002 HC RX W HCPCS

## 2018-05-11 PROCEDURE — C1887 CATHETER, GUIDING: HCPCS

## 2018-05-11 PROCEDURE — B2111ZZ FLUOROSCOPY OF MULTIPLE CORONARY ARTERIES USING LOW OSMOLAR CONTRAST: ICD-10-PCS | Performed by: INTERNAL MEDICINE

## 2018-05-11 PROCEDURE — C1725 CATH, TRANSLUMIN NON-LASER: HCPCS

## 2018-05-11 PROCEDURE — C1760 CLOSURE DEV, VASC: HCPCS

## 2018-05-11 RX ORDER — METOCLOPRAMIDE HYDROCHLORIDE 5 MG/ML
10 INJECTION INTRAMUSCULAR; INTRAVENOUS EVERY 6 HOURS
Status: DISCONTINUED | OUTPATIENT
Start: 2018-05-11 | End: 2018-05-13 | Stop reason: HOSPADM

## 2018-05-11 RX ORDER — CLOPIDOGREL BISULFATE 75 MG/1
75 TABLET ORAL DAILY
Status: DISCONTINUED | OUTPATIENT
Start: 2018-05-11 | End: 2018-05-13 | Stop reason: HOSPADM

## 2018-05-11 RX ORDER — ONDANSETRON 2 MG/ML
4 INJECTION INTRAMUSCULAR; INTRAVENOUS EVERY 6 HOURS PRN
Status: DISCONTINUED | OUTPATIENT
Start: 2018-05-11 | End: 2018-05-13 | Stop reason: HOSPADM

## 2018-05-11 RX ORDER — ONDANSETRON 2 MG/ML
INJECTION INTRAMUSCULAR; INTRAVENOUS
Status: COMPLETED
Start: 2018-05-11 | End: 2018-05-11

## 2018-05-11 RX ORDER — PANTOPRAZOLE SODIUM 40 MG/1
40 TABLET, DELAYED RELEASE ORAL
Status: DISCONTINUED | OUTPATIENT
Start: 2018-05-11 | End: 2018-05-13 | Stop reason: HOSPADM

## 2018-05-11 RX ORDER — METOPROLOL TARTRATE 5 MG/5ML
5 INJECTION INTRAVENOUS EVERY 6 HOURS
Status: DISCONTINUED | OUTPATIENT
Start: 2018-05-11 | End: 2018-05-11

## 2018-05-11 RX ORDER — PANTOPRAZOLE SODIUM 40 MG/10ML
40 INJECTION, POWDER, LYOPHILIZED, FOR SOLUTION INTRAVENOUS ONCE
Status: COMPLETED | OUTPATIENT
Start: 2018-05-11 | End: 2018-05-11

## 2018-05-11 RX ADMIN — SERTRALINE 100 MG: 100 TABLET, FILM COATED ORAL at 13:25

## 2018-05-11 RX ADMIN — METOCLOPRAMIDE 10 MG: 5 INJECTION, SOLUTION INTRAMUSCULAR; INTRAVENOUS at 16:47

## 2018-05-11 RX ADMIN — Medication 10 ML: at 08:35

## 2018-05-11 RX ADMIN — CLOPIDOGREL BISULFATE 75 MG: 75 TABLET ORAL at 10:24

## 2018-05-11 RX ADMIN — ONDANSETRON 4 MG: 2 INJECTION INTRAMUSCULAR; INTRAVENOUS at 01:44

## 2018-05-11 RX ADMIN — METOPROLOL TARTRATE 25 MG: 25 TABLET ORAL at 23:31

## 2018-05-11 RX ADMIN — ONDANSETRON 4 MG: 2 INJECTION INTRAMUSCULAR; INTRAVENOUS at 07:00

## 2018-05-11 RX ADMIN — ATORVASTATIN CALCIUM 40 MG: 40 TABLET, FILM COATED ORAL at 20:02

## 2018-05-11 RX ADMIN — AMLODIPINE BESYLATE 5 MG: 5 TABLET ORAL at 13:25

## 2018-05-11 RX ADMIN — ASPIRIN 81 MG: 81 TABLET, COATED ORAL at 10:24

## 2018-05-11 RX ADMIN — METOPROLOL TARTRATE 5 MG: 5 INJECTION, SOLUTION INTRAVENOUS at 15:46

## 2018-05-11 RX ADMIN — METOPROLOL TARTRATE 5 MG: 5 INJECTION, SOLUTION INTRAVENOUS at 08:35

## 2018-05-11 RX ADMIN — ROPINIROLE HYDROCHLORIDE 1 MG: 1 TABLET, FILM COATED ORAL at 20:02

## 2018-05-11 RX ADMIN — ONDANSETRON 4 MG: 2 INJECTION INTRAMUSCULAR; INTRAVENOUS at 20:03

## 2018-05-11 RX ADMIN — PANTOPRAZOLE SODIUM 40 MG: 40 TABLET, DELAYED RELEASE ORAL at 15:46

## 2018-05-11 RX ADMIN — PANTOPRAZOLE SODIUM 40 MG: 40 INJECTION, POWDER, FOR SOLUTION INTRAVENOUS at 08:35

## 2018-05-11 RX ADMIN — Medication 10 ML: at 20:03

## 2018-05-11 RX ADMIN — Medication 15 ML: at 11:59

## 2018-05-11 RX ADMIN — METOCLOPRAMIDE 10 MG: 5 INJECTION, SOLUTION INTRAMUSCULAR; INTRAVENOUS at 13:25

## 2018-05-11 RX ADMIN — Medication 15 ML: at 16:47

## 2018-05-11 RX ADMIN — ONDANSETRON 4 MG: 2 INJECTION INTRAMUSCULAR; INTRAVENOUS at 10:21

## 2018-05-11 ASSESSMENT — PAIN SCALES - GENERAL
PAINLEVEL_OUTOF10: 0

## 2018-05-12 LAB
EKG ATRIAL RATE: 75 BPM
EKG P AXIS: 91 DEGREES
EKG P-R INTERVAL: 176 MS
EKG Q-T INTERVAL: 444 MS
EKG QRS DURATION: 156 MS
EKG QTC CALCULATION (BAZETT): 495 MS
EKG R AXIS: 27 DEGREES
EKG T AXIS: 142 DEGREES
EKG VENTRICULAR RATE: 75 BPM

## 2018-05-12 PROCEDURE — 2100000000 HC CCU R&B

## 2018-05-12 PROCEDURE — 6360000002 HC RX W HCPCS: Performed by: INTERNAL MEDICINE

## 2018-05-12 PROCEDURE — 2580000003 HC RX 258: Performed by: INTERNAL MEDICINE

## 2018-05-12 PROCEDURE — 6370000000 HC RX 637 (ALT 250 FOR IP): Performed by: INTERNAL MEDICINE

## 2018-05-12 PROCEDURE — 93010 ELECTROCARDIOGRAM REPORT: CPT | Performed by: INTERNAL MEDICINE

## 2018-05-12 RX ORDER — SODIUM CHLORIDE 0.9 % (FLUSH) 0.9 %
10 SYRINGE (ML) INJECTION EVERY 12 HOURS SCHEDULED
Status: DISCONTINUED | OUTPATIENT
Start: 2018-05-12 | End: 2018-05-12 | Stop reason: CLARIF

## 2018-05-12 RX ORDER — LISINOPRIL 2.5 MG/1
2.5 TABLET ORAL DAILY
Status: DISCONTINUED | OUTPATIENT
Start: 2018-05-12 | End: 2018-05-13 | Stop reason: HOSPADM

## 2018-05-12 RX ORDER — SODIUM CHLORIDE 0.9 % (FLUSH) 0.9 %
10 SYRINGE (ML) INJECTION PRN
Status: DISCONTINUED | OUTPATIENT
Start: 2018-05-12 | End: 2018-05-12 | Stop reason: CLARIF

## 2018-05-12 RX ORDER — DIPHENHYDRAMINE HYDROCHLORIDE 50 MG/ML
50 INJECTION INTRAMUSCULAR; INTRAVENOUS ONCE
Status: DISCONTINUED | OUTPATIENT
Start: 2018-05-12 | End: 2018-05-12 | Stop reason: CLARIF

## 2018-05-12 RX ORDER — METOPROLOL TARTRATE 50 MG/1
50 TABLET, FILM COATED ORAL 2 TIMES DAILY
Status: DISCONTINUED | OUTPATIENT
Start: 2018-05-12 | End: 2018-05-13 | Stop reason: HOSPADM

## 2018-05-12 RX ORDER — SODIUM CHLORIDE 9 MG/ML
INJECTION, SOLUTION INTRAVENOUS CONTINUOUS
Status: DISCONTINUED | OUTPATIENT
Start: 2018-05-12 | End: 2018-05-12 | Stop reason: CLARIF

## 2018-05-12 RX ORDER — NITROGLYCERIN 0.4 MG/1
0.4 TABLET SUBLINGUAL EVERY 5 MIN PRN
Status: DISCONTINUED | OUTPATIENT
Start: 2018-05-12 | End: 2018-05-12 | Stop reason: CLARIF

## 2018-05-12 RX ORDER — ONDANSETRON 2 MG/ML
4 INJECTION INTRAMUSCULAR; INTRAVENOUS EVERY 6 HOURS PRN
Status: DISCONTINUED | OUTPATIENT
Start: 2018-05-12 | End: 2018-05-12 | Stop reason: CLARIF

## 2018-05-12 RX ORDER — ACETAMINOPHEN 325 MG/1
650 TABLET ORAL EVERY 4 HOURS PRN
Status: DISCONTINUED | OUTPATIENT
Start: 2018-05-12 | End: 2018-05-13 | Stop reason: HOSPADM

## 2018-05-12 RX ORDER — SODIUM CHLORIDE 9 MG/ML
75 INJECTION, SOLUTION INTRAVENOUS CONTINUOUS
Status: DISCONTINUED | OUTPATIENT
Start: 2018-05-12 | End: 2018-05-12 | Stop reason: CLARIF

## 2018-05-12 RX ADMIN — LISINOPRIL 2.5 MG: 2.5 TABLET ORAL at 17:16

## 2018-05-12 RX ADMIN — METOCLOPRAMIDE 10 MG: 5 INJECTION, SOLUTION INTRAMUSCULAR; INTRAVENOUS at 23:45

## 2018-05-12 RX ADMIN — SERTRALINE 100 MG: 100 TABLET, FILM COATED ORAL at 08:39

## 2018-05-12 RX ADMIN — ROPINIROLE HYDROCHLORIDE 1 MG: 1 TABLET, FILM COATED ORAL at 20:38

## 2018-05-12 RX ADMIN — Medication 15 ML: at 08:39

## 2018-05-12 RX ADMIN — ASPIRIN 81 MG: 81 TABLET, COATED ORAL at 10:07

## 2018-05-12 RX ADMIN — Medication 10 ML: at 08:40

## 2018-05-12 RX ADMIN — METOCLOPRAMIDE 10 MG: 5 INJECTION, SOLUTION INTRAMUSCULAR; INTRAVENOUS at 06:11

## 2018-05-12 RX ADMIN — METOCLOPRAMIDE 10 MG: 5 INJECTION, SOLUTION INTRAMUSCULAR; INTRAVENOUS at 14:07

## 2018-05-12 RX ADMIN — PANTOPRAZOLE SODIUM 40 MG: 40 TABLET, DELAYED RELEASE ORAL at 06:11

## 2018-05-12 RX ADMIN — Medication 10 ML: at 20:39

## 2018-05-12 RX ADMIN — CLOPIDOGREL BISULFATE 75 MG: 75 TABLET ORAL at 08:39

## 2018-05-12 RX ADMIN — METOPROLOL TARTRATE 50 MG: 25 TABLET ORAL at 20:38

## 2018-05-12 RX ADMIN — PANTOPRAZOLE SODIUM 40 MG: 40 TABLET, DELAYED RELEASE ORAL at 17:16

## 2018-05-12 RX ADMIN — METOPROLOL TARTRATE 25 MG: 25 TABLET ORAL at 08:39

## 2018-05-12 RX ADMIN — Medication 10 ML: at 23:45

## 2018-05-12 RX ADMIN — AMLODIPINE BESYLATE 5 MG: 5 TABLET ORAL at 08:39

## 2018-05-12 RX ADMIN — ATORVASTATIN CALCIUM 40 MG: 40 TABLET, FILM COATED ORAL at 20:38

## 2018-05-12 RX ADMIN — METOCLOPRAMIDE 10 MG: 5 INJECTION, SOLUTION INTRAMUSCULAR; INTRAVENOUS at 17:17

## 2018-05-12 RX ADMIN — METOCLOPRAMIDE 10 MG: 5 INJECTION, SOLUTION INTRAMUSCULAR; INTRAVENOUS at 00:00

## 2018-05-12 ASSESSMENT — PAIN SCALES - GENERAL
PAINLEVEL_OUTOF10: 0

## 2018-05-13 VITALS
TEMPERATURE: 98.4 F | HEIGHT: 63 IN | DIASTOLIC BLOOD PRESSURE: 71 MMHG | HEART RATE: 76 BPM | OXYGEN SATURATION: 97 % | SYSTOLIC BLOOD PRESSURE: 154 MMHG | BODY MASS INDEX: 22.42 KG/M2 | WEIGHT: 126.54 LBS | RESPIRATION RATE: 24 BRPM

## 2018-05-13 PROBLEM — I21.4 NSTEMI (NON-ST ELEVATED MYOCARDIAL INFARCTION) (HCC): Status: ACTIVE | Noted: 2018-05-13

## 2018-05-13 PROCEDURE — 2580000003 HC RX 258: Performed by: INTERNAL MEDICINE

## 2018-05-13 PROCEDURE — 6360000002 HC RX W HCPCS: Performed by: INTERNAL MEDICINE

## 2018-05-13 PROCEDURE — 6370000000 HC RX 637 (ALT 250 FOR IP): Performed by: INTERNAL MEDICINE

## 2018-05-13 PROCEDURE — 99232 SBSQ HOSP IP/OBS MODERATE 35: CPT | Performed by: PHYSICIAN ASSISTANT

## 2018-05-13 RX ORDER — NITROGLYCERIN 0.4 MG/1
0.4 TABLET SUBLINGUAL EVERY 5 MIN PRN
Qty: 25 TABLET | Refills: 3 | Status: SHIPPED | OUTPATIENT
Start: 2018-05-13

## 2018-05-13 RX ORDER — LISINOPRIL 2.5 MG/1
2.5 TABLET ORAL DAILY
Qty: 30 TABLET | Refills: 3 | Status: SHIPPED | OUTPATIENT
Start: 2018-05-14 | End: 2019-06-16

## 2018-05-13 RX ORDER — HYDRALAZINE HYDROCHLORIDE 20 MG/ML
10 INJECTION INTRAMUSCULAR; INTRAVENOUS EVERY 4 HOURS PRN
Status: DISCONTINUED | OUTPATIENT
Start: 2018-05-13 | End: 2018-05-13 | Stop reason: HOSPADM

## 2018-05-13 RX ORDER — METOPROLOL TARTRATE 50 MG/1
50 TABLET, FILM COATED ORAL 2 TIMES DAILY
Qty: 60 TABLET | Refills: 3 | Status: SHIPPED | OUTPATIENT
Start: 2018-05-13

## 2018-05-13 RX ORDER — PANTOPRAZOLE SODIUM 40 MG/1
40 TABLET, DELAYED RELEASE ORAL 2 TIMES DAILY
Qty: 60 TABLET | Refills: 3 | Status: SHIPPED | OUTPATIENT
Start: 2018-05-13

## 2018-05-13 RX ORDER — AMLODIPINE BESYLATE 10 MG/1
10 TABLET ORAL DAILY
Qty: 30 TABLET | Refills: 3 | Status: SHIPPED | OUTPATIENT
Start: 2018-05-13 | End: 2018-05-30 | Stop reason: DRUGHIGH

## 2018-05-13 RX ORDER — ATORVASTATIN CALCIUM 40 MG/1
40 TABLET, FILM COATED ORAL NIGHTLY
Qty: 30 TABLET | Refills: 3 | Status: SHIPPED | OUTPATIENT
Start: 2018-05-13

## 2018-05-13 RX ORDER — CLOPIDOGREL BISULFATE 75 MG/1
75 TABLET ORAL DAILY
Qty: 30 TABLET | Refills: 3 | Status: SHIPPED | OUTPATIENT
Start: 2018-05-14 | End: 2019-03-18 | Stop reason: SDUPTHER

## 2018-05-13 RX ADMIN — PANTOPRAZOLE SODIUM 40 MG: 40 TABLET, DELAYED RELEASE ORAL at 05:34

## 2018-05-13 RX ADMIN — Medication 10 ML: at 05:34

## 2018-05-13 RX ADMIN — LISINOPRIL 2.5 MG: 2.5 TABLET ORAL at 08:28

## 2018-05-13 RX ADMIN — METOCLOPRAMIDE 10 MG: 5 INJECTION, SOLUTION INTRAMUSCULAR; INTRAVENOUS at 05:34

## 2018-05-13 RX ADMIN — HYDRALAZINE HYDROCHLORIDE 10 MG: 20 INJECTION INTRAMUSCULAR; INTRAVENOUS at 05:34

## 2018-05-13 RX ADMIN — SERTRALINE 100 MG: 100 TABLET, FILM COATED ORAL at 08:28

## 2018-05-13 RX ADMIN — CLOPIDOGREL BISULFATE 75 MG: 75 TABLET ORAL at 08:28

## 2018-05-13 RX ADMIN — METOPROLOL TARTRATE 50 MG: 25 TABLET ORAL at 08:28

## 2018-05-13 RX ADMIN — Medication 10 ML: at 08:29

## 2018-05-13 RX ADMIN — AMLODIPINE BESYLATE 5 MG: 5 TABLET ORAL at 08:28

## 2018-05-13 RX ADMIN — ASPIRIN 81 MG: 81 TABLET, COATED ORAL at 08:28

## 2018-05-13 ASSESSMENT — PAIN SCALES - GENERAL
PAINLEVEL_OUTOF10: 0

## 2018-05-18 ENCOUNTER — TELEPHONE (OUTPATIENT)
Dept: CARDIOLOGY CLINIC | Age: 83
End: 2018-05-18

## 2018-05-30 ENCOUNTER — OFFICE VISIT (OUTPATIENT)
Dept: CARDIOLOGY CLINIC | Age: 83
End: 2018-05-30
Payer: MEDICARE

## 2018-05-30 VITALS
HEART RATE: 76 BPM | BODY MASS INDEX: 22.25 KG/M2 | DIASTOLIC BLOOD PRESSURE: 42 MMHG | WEIGHT: 125.6 LBS | SYSTOLIC BLOOD PRESSURE: 106 MMHG | HEIGHT: 63 IN

## 2018-05-30 DIAGNOSIS — E78.00 PURE HYPERCHOLESTEROLEMIA: ICD-10-CM

## 2018-05-30 DIAGNOSIS — Z95.0 PACEMAKER: ICD-10-CM

## 2018-05-30 DIAGNOSIS — I25.10 CORONARY ARTERY DISEASE INVOLVING NATIVE CORONARY ARTERY OF NATIVE HEART WITHOUT ANGINA PECTORIS: Primary | ICD-10-CM

## 2018-05-30 DIAGNOSIS — Z95.820 S/P ANGIOPLASTY WITH STENT: ICD-10-CM

## 2018-05-30 PROCEDURE — 1123F ACP DISCUSS/DSCN MKR DOCD: CPT | Performed by: PHYSICIAN ASSISTANT

## 2018-05-30 PROCEDURE — 99213 OFFICE O/P EST LOW 20 MIN: CPT | Performed by: PHYSICIAN ASSISTANT

## 2018-05-30 PROCEDURE — 1111F DSCHRG MED/CURRENT MED MERGE: CPT | Performed by: PHYSICIAN ASSISTANT

## 2018-05-30 PROCEDURE — G8598 ASA/ANTIPLAT THER USED: HCPCS | Performed by: PHYSICIAN ASSISTANT

## 2018-05-30 PROCEDURE — 4040F PNEUMOC VAC/ADMIN/RCVD: CPT | Performed by: PHYSICIAN ASSISTANT

## 2018-05-30 PROCEDURE — G8428 CUR MEDS NOT DOCUMENT: HCPCS | Performed by: PHYSICIAN ASSISTANT

## 2018-05-30 PROCEDURE — G8420 CALC BMI NORM PARAMETERS: HCPCS | Performed by: PHYSICIAN ASSISTANT

## 2018-05-30 PROCEDURE — 1036F TOBACCO NON-USER: CPT | Performed by: PHYSICIAN ASSISTANT

## 2018-05-30 PROCEDURE — 93000 ELECTROCARDIOGRAM COMPLETE: CPT | Performed by: PHYSICIAN ASSISTANT

## 2018-05-30 PROCEDURE — 1090F PRES/ABSN URINE INCON ASSESS: CPT | Performed by: PHYSICIAN ASSISTANT

## 2018-05-30 PROCEDURE — G8400 PT W/DXA NO RESULTS DOC: HCPCS | Performed by: PHYSICIAN ASSISTANT

## 2018-05-30 RX ORDER — AMLODIPINE BESYLATE 10 MG/1
5 TABLET ORAL DAILY
Qty: 30 TABLET | Refills: 3 | Status: SHIPPED | OUTPATIENT
Start: 2018-05-30

## 2019-01-29 ENCOUNTER — TELEPHONE (OUTPATIENT)
Dept: CARDIOLOGY CLINIC | Age: 84
End: 2019-01-29

## 2019-01-29 ENCOUNTER — OFFICE VISIT (OUTPATIENT)
Dept: CARDIOLOGY CLINIC | Age: 84
End: 2019-01-29
Payer: MEDICARE

## 2019-01-29 ENCOUNTER — NURSE ONLY (OUTPATIENT)
Dept: CARDIOLOGY CLINIC | Age: 84
End: 2019-01-29
Payer: MEDICARE

## 2019-01-29 VITALS
HEIGHT: 62 IN | BODY MASS INDEX: 24.48 KG/M2 | WEIGHT: 133 LBS | DIASTOLIC BLOOD PRESSURE: 62 MMHG | HEART RATE: 80 BPM | SYSTOLIC BLOOD PRESSURE: 126 MMHG

## 2019-01-29 DIAGNOSIS — R06.02 SHORTNESS OF BREATH: ICD-10-CM

## 2019-01-29 DIAGNOSIS — I35.0 MODERATE AORTIC STENOSIS: ICD-10-CM

## 2019-01-29 DIAGNOSIS — Z95.0 PACEMAKER: Primary | ICD-10-CM

## 2019-01-29 DIAGNOSIS — I48.91 ATRIAL FIBRILLATION, UNSPECIFIED TYPE (HCC): ICD-10-CM

## 2019-01-29 DIAGNOSIS — I25.10 CORONARY ARTERY DISEASE INVOLVING NATIVE CORONARY ARTERY OF NATIVE HEART WITHOUT ANGINA PECTORIS: Primary | ICD-10-CM

## 2019-01-29 PROCEDURE — G8484 FLU IMMUNIZE NO ADMIN: HCPCS | Performed by: INTERNAL MEDICINE

## 2019-01-29 PROCEDURE — 1101F PT FALLS ASSESS-DOCD LE1/YR: CPT | Performed by: INTERNAL MEDICINE

## 2019-01-29 PROCEDURE — 4040F PNEUMOC VAC/ADMIN/RCVD: CPT | Performed by: INTERNAL MEDICINE

## 2019-01-29 PROCEDURE — G8598 ASA/ANTIPLAT THER USED: HCPCS | Performed by: INTERNAL MEDICINE

## 2019-01-29 PROCEDURE — G8427 DOCREV CUR MEDS BY ELIG CLIN: HCPCS | Performed by: INTERNAL MEDICINE

## 2019-01-29 PROCEDURE — G8400 PT W/DXA NO RESULTS DOC: HCPCS | Performed by: INTERNAL MEDICINE

## 2019-01-29 PROCEDURE — 1123F ACP DISCUSS/DSCN MKR DOCD: CPT | Performed by: INTERNAL MEDICINE

## 2019-01-29 PROCEDURE — 99213 OFFICE O/P EST LOW 20 MIN: CPT | Performed by: INTERNAL MEDICINE

## 2019-01-29 PROCEDURE — 1036F TOBACCO NON-USER: CPT | Performed by: INTERNAL MEDICINE

## 2019-01-29 PROCEDURE — 1090F PRES/ABSN URINE INCON ASSESS: CPT | Performed by: INTERNAL MEDICINE

## 2019-01-29 PROCEDURE — G8420 CALC BMI NORM PARAMETERS: HCPCS | Performed by: INTERNAL MEDICINE

## 2019-01-29 PROCEDURE — 93280 PM DEVICE PROGR EVAL DUAL: CPT | Performed by: INTERNAL MEDICINE

## 2019-02-05 ENCOUNTER — HOSPITAL ENCOUNTER (OUTPATIENT)
Dept: NON INVASIVE DIAGNOSTICS | Age: 84
Discharge: HOME | End: 2019-02-05
Payer: MEDICARE

## 2019-02-05 DIAGNOSIS — I35.0 MODERATE AORTIC STENOSIS: ICD-10-CM

## 2019-02-05 DIAGNOSIS — R06.02 SHORTNESS OF BREATH: ICD-10-CM

## 2019-02-05 LAB
LV EF: 63 %
LVEF MODALITY: NORMAL

## 2019-02-05 PROCEDURE — 93306 TTE W/DOPPLER COMPLETE: CPT

## 2019-02-08 RX ORDER — SODIUM CHLORIDE 450 MG/100ML
INJECTION, SOLUTION INTRAVENOUS CONTINUOUS
Status: CANCELLED | OUTPATIENT
Start: 2019-02-08

## 2019-02-11 ENCOUNTER — HOSPITAL ENCOUNTER (OUTPATIENT)
Dept: GENERAL RADIOLOGY | Age: 84
Discharge: HOME | End: 2019-02-11
Payer: MEDICARE

## 2019-02-13 ENCOUNTER — OFFICE VISIT (OUTPATIENT)
Dept: CARDIOLOGY CLINIC | Age: 84
End: 2019-02-13
Payer: MEDICARE

## 2019-02-13 VITALS
SYSTOLIC BLOOD PRESSURE: 102 MMHG | HEART RATE: 76 BPM | HEIGHT: 62 IN | WEIGHT: 132.4 LBS | DIASTOLIC BLOOD PRESSURE: 50 MMHG | BODY MASS INDEX: 24.37 KG/M2

## 2019-02-13 DIAGNOSIS — Z45.010 PACEMAKER BATTERY DEPLETION: Primary | ICD-10-CM

## 2019-02-13 PROCEDURE — G8420 CALC BMI NORM PARAMETERS: HCPCS | Performed by: INTERNAL MEDICINE

## 2019-02-13 PROCEDURE — 4040F PNEUMOC VAC/ADMIN/RCVD: CPT | Performed by: INTERNAL MEDICINE

## 2019-02-13 PROCEDURE — 1090F PRES/ABSN URINE INCON ASSESS: CPT | Performed by: INTERNAL MEDICINE

## 2019-02-13 PROCEDURE — G8400 PT W/DXA NO RESULTS DOC: HCPCS | Performed by: INTERNAL MEDICINE

## 2019-02-13 PROCEDURE — 1036F TOBACCO NON-USER: CPT | Performed by: INTERNAL MEDICINE

## 2019-02-13 PROCEDURE — G8428 CUR MEDS NOT DOCUMENT: HCPCS | Performed by: INTERNAL MEDICINE

## 2019-02-13 PROCEDURE — 1123F ACP DISCUSS/DSCN MKR DOCD: CPT | Performed by: INTERNAL MEDICINE

## 2019-02-13 PROCEDURE — 1101F PT FALLS ASSESS-DOCD LE1/YR: CPT | Performed by: INTERNAL MEDICINE

## 2019-02-13 PROCEDURE — G8484 FLU IMMUNIZE NO ADMIN: HCPCS | Performed by: INTERNAL MEDICINE

## 2019-02-13 PROCEDURE — 99205 OFFICE O/P NEW HI 60 MIN: CPT | Performed by: INTERNAL MEDICINE

## 2019-02-13 PROCEDURE — 93000 ELECTROCARDIOGRAM COMPLETE: CPT | Performed by: INTERNAL MEDICINE

## 2019-02-13 PROCEDURE — G8598 ASA/ANTIPLAT THER USED: HCPCS | Performed by: INTERNAL MEDICINE

## 2019-02-13 ASSESSMENT — ENCOUNTER SYMPTOMS
WHEEZING: 0
SORE THROAT: 0
DOUBLE VISION: 0
DIARRHEA: 0
LEFT EYE: 0
RIGHT EYE: 0
SHORTNESS OF BREATH: 0
ABDOMINAL PAIN: 0
COUGH: 0
NAUSEA: 0
BLURRED VISION: 0
CONSTIPATION: 0
BACK PAIN: 0
VOMITING: 0

## 2019-02-19 ENCOUNTER — TELEPHONE (OUTPATIENT)
Dept: CARDIOLOGY CLINIC | Age: 84
End: 2019-02-19

## 2019-02-19 ENCOUNTER — PREP FOR PROCEDURE (OUTPATIENT)
Dept: CARDIOLOGY | Age: 84
End: 2019-02-19

## 2019-02-19 RX ORDER — SODIUM CHLORIDE 9 MG/ML
INJECTION, SOLUTION INTRAVENOUS CONTINUOUS
Status: CANCELLED | OUTPATIENT
Start: 2019-02-19

## 2019-02-19 RX ORDER — CHLORHEXIDINE GLUCONATE 4 G/100ML
SOLUTION TOPICAL ONCE
Status: CANCELLED | OUTPATIENT
Start: 2019-02-19 | End: 2019-02-19

## 2019-02-19 RX ORDER — SODIUM CHLORIDE 0.9 % (FLUSH) 0.9 %
10 SYRINGE (ML) INJECTION EVERY 12 HOURS SCHEDULED
Status: CANCELLED | OUTPATIENT
Start: 2019-02-19

## 2019-02-19 RX ORDER — SODIUM CHLORIDE 0.9 % (FLUSH) 0.9 %
10 SYRINGE (ML) INJECTION PRN
Status: CANCELLED | OUTPATIENT
Start: 2019-02-19

## 2019-02-20 ENCOUNTER — APPOINTMENT (OUTPATIENT)
Dept: CARDIAC CATH/INVASIVE PROCEDURES | Age: 84
End: 2019-02-20
Attending: INTERNAL MEDICINE
Payer: MEDICARE

## 2019-02-20 ENCOUNTER — HOSPITAL ENCOUNTER (OUTPATIENT)
Dept: INPATIENT UNIT | Age: 84
Discharge: HOME | End: 2019-02-20
Attending: INTERNAL MEDICINE | Admitting: INTERNAL MEDICINE
Payer: MEDICARE

## 2019-02-20 VITALS
TEMPERATURE: 97.9 F | OXYGEN SATURATION: 93 % | DIASTOLIC BLOOD PRESSURE: 53 MMHG | HEIGHT: 62 IN | RESPIRATION RATE: 15 BRPM | BODY MASS INDEX: 23.92 KG/M2 | SYSTOLIC BLOOD PRESSURE: 141 MMHG | WEIGHT: 130 LBS | HEART RATE: 80 BPM

## 2019-02-20 LAB
ANION GAP SERPL CALCULATED.3IONS-SCNC: 13 MEQ/L (ref 8–16)
APTT: 31.8 SECONDS (ref 22–38)
BASOPHILS # BLD: 0.9 %
BASOPHILS ABSOLUTE: 0.1 THOU/MM3 (ref 0–0.1)
BUN BLDV-MCNC: 15 MG/DL (ref 7–22)
CALCIUM SERPL-MCNC: 9.3 MG/DL (ref 8.5–10.5)
CHLORIDE BLD-SCNC: 100 MEQ/L (ref 98–111)
CO2: 27 MEQ/L (ref 23–33)
CREAT SERPL-MCNC: 0.7 MG/DL (ref 0.4–1.2)
EOSINOPHIL # BLD: 2.6 %
EOSINOPHILS ABSOLUTE: 0.2 THOU/MM3 (ref 0–0.4)
ERYTHROCYTE [DISTWIDTH] IN BLOOD BY AUTOMATED COUNT: 13.3 % (ref 11.5–14.5)
ERYTHROCYTE [DISTWIDTH] IN BLOOD BY AUTOMATED COUNT: 45.7 FL (ref 35–45)
GFR SERPL CREATININE-BSD FRML MDRD: 80 ML/MIN/1.73M2
GLUCOSE BLD-MCNC: 99 MG/DL (ref 70–108)
HCT VFR BLD CALC: 41.6 % (ref 37–47)
HEMOGLOBIN: 13.4 GM/DL (ref 12–16)
IMMATURE GRANS (ABS): 0.02 THOU/MM3 (ref 0–0.07)
IMMATURE GRANULOCYTES: 0.3 %
INR BLD: 1.17 (ref 0.85–1.13)
LYMPHOCYTES # BLD: 32.1 %
LYMPHOCYTES ABSOLUTE: 2.4 THOU/MM3 (ref 1–4.8)
MCH RBC QN AUTO: 30.2 PG (ref 26–33)
MCHC RBC AUTO-ENTMCNC: 32.2 GM/DL (ref 32.2–35.5)
MCV RBC AUTO: 93.9 FL (ref 81–99)
MONOCYTES # BLD: 8.5 %
MONOCYTES ABSOLUTE: 0.6 THOU/MM3 (ref 0.4–1.3)
NUCLEATED RED BLOOD CELLS: 0 /100 WBC
PLATELET # BLD: 234 THOU/MM3 (ref 130–400)
PMV BLD AUTO: 9.3 FL (ref 9.4–12.4)
POTASSIUM REFLEX MAGNESIUM: 4.6 MEQ/L (ref 3.5–5.2)
RBC # BLD: 4.43 MILL/MM3 (ref 4.2–5.4)
SEG NEUTROPHILS: 55.6 %
SEGMENTED NEUTROPHILS ABSOLUTE COUNT: 4.1 THOU/MM3 (ref 1.8–7.7)
SODIUM BLD-SCNC: 140 MEQ/L (ref 135–145)
WBC # BLD: 7.4 THOU/MM3 (ref 4.8–10.8)

## 2019-02-20 PROCEDURE — 33228 REMV&REPLC PM GEN DUAL LEAD: CPT | Performed by: INTERNAL MEDICINE

## 2019-02-20 PROCEDURE — 2709999900 HC NON-CHARGEABLE SUPPLY

## 2019-02-20 PROCEDURE — 85730 THROMBOPLASTIN TIME PARTIAL: CPT

## 2019-02-20 PROCEDURE — 85610 PROTHROMBIN TIME: CPT

## 2019-02-20 PROCEDURE — 6360000002 HC RX W HCPCS: Performed by: PHYSICIAN ASSISTANT

## 2019-02-20 PROCEDURE — 2580000003 HC RX 258: Performed by: PHYSICIAN ASSISTANT

## 2019-02-20 PROCEDURE — 33228 REMV&REPLC PM GEN DUAL LEAD: CPT

## 2019-02-20 PROCEDURE — 93005 ELECTROCARDIOGRAM TRACING: CPT | Performed by: INTERNAL MEDICINE

## 2019-02-20 PROCEDURE — 2500000003 HC RX 250 WO HCPCS: Performed by: INTERNAL MEDICINE

## 2019-02-20 PROCEDURE — 2580000003 HC RX 258: Performed by: INTERNAL MEDICINE

## 2019-02-20 PROCEDURE — 85025 COMPLETE CBC W/AUTO DIFF WBC: CPT

## 2019-02-20 PROCEDURE — C1785 PMKR, DUAL, RATE-RESP: HCPCS

## 2019-02-20 PROCEDURE — 2500000003 HC RX 250 WO HCPCS

## 2019-02-20 PROCEDURE — 93005 ELECTROCARDIOGRAM TRACING: CPT | Performed by: PHYSICIAN ASSISTANT

## 2019-02-20 PROCEDURE — 36415 COLL VENOUS BLD VENIPUNCTURE: CPT

## 2019-02-20 PROCEDURE — 80048 BASIC METABOLIC PNL TOTAL CA: CPT

## 2019-02-20 RX ORDER — SODIUM CHLORIDE 0.9 % (FLUSH) 0.9 %
10 SYRINGE (ML) INJECTION PRN
Status: DISCONTINUED | OUTPATIENT
Start: 2019-02-20 | End: 2019-02-20 | Stop reason: HOSPADM

## 2019-02-20 RX ORDER — ONDANSETRON 2 MG/ML
4 INJECTION INTRAMUSCULAR; INTRAVENOUS EVERY 6 HOURS PRN
Status: DISCONTINUED | OUTPATIENT
Start: 2019-02-20 | End: 2019-02-20 | Stop reason: HOSPADM

## 2019-02-20 RX ORDER — HYDROCODONE BITARTRATE AND ACETAMINOPHEN 5; 325 MG/1; MG/1
2 TABLET ORAL EVERY 4 HOURS PRN
Status: DISCONTINUED | OUTPATIENT
Start: 2019-02-20 | End: 2019-02-20 | Stop reason: HOSPADM

## 2019-02-20 RX ORDER — HYDROCODONE BITARTRATE AND ACETAMINOPHEN 5; 325 MG/1; MG/1
1 TABLET ORAL EVERY 4 HOURS PRN
Status: DISCONTINUED | OUTPATIENT
Start: 2019-02-20 | End: 2019-02-20 | Stop reason: HOSPADM

## 2019-02-20 RX ORDER — ACETAMINOPHEN 325 MG/1
650 TABLET ORAL EVERY 4 HOURS PRN
Status: DISCONTINUED | OUTPATIENT
Start: 2019-02-20 | End: 2019-02-20 | Stop reason: HOSPADM

## 2019-02-20 RX ORDER — SODIUM CHLORIDE 9 MG/ML
INJECTION, SOLUTION INTRAVENOUS CONTINUOUS
Status: DISCONTINUED | OUTPATIENT
Start: 2019-02-20 | End: 2019-02-20 | Stop reason: HOSPADM

## 2019-02-20 RX ADMIN — SODIUM CHLORIDE 50000 UNITS: 900 IRRIGANT IRRIGATION at 15:26

## 2019-02-20 RX ADMIN — Medication 2 G: at 15:27

## 2019-02-20 RX ADMIN — SODIUM CHLORIDE: 9 INJECTION, SOLUTION INTRAVENOUS at 14:09

## 2019-02-21 LAB
EKG ATRIAL RATE: 75 BPM
EKG ATRIAL RATE: 75 BPM
EKG P AXIS: 69 DEGREES
EKG P AXIS: 80 DEGREES
EKG P-R INTERVAL: 176 MS
EKG P-R INTERVAL: 200 MS
EKG Q-T INTERVAL: 448 MS
EKG Q-T INTERVAL: 448 MS
EKG QRS DURATION: 158 MS
EKG QRS DURATION: 160 MS
EKG QTC CALCULATION (BAZETT): 500 MS
EKG QTC CALCULATION (BAZETT): 500 MS
EKG R AXIS: -25 DEGREES
EKG R AXIS: -44 DEGREES
EKG T AXIS: 88 DEGREES
EKG T AXIS: 88 DEGREES
EKG VENTRICULAR RATE: 75 BPM
EKG VENTRICULAR RATE: 75 BPM

## 2019-02-21 PROCEDURE — 93010 ELECTROCARDIOGRAM REPORT: CPT | Performed by: INTERNAL MEDICINE

## 2019-02-27 ENCOUNTER — NURSE ONLY (OUTPATIENT)
Dept: CARDIOLOGY CLINIC | Age: 84
End: 2019-02-27
Payer: MEDICARE

## 2019-02-27 DIAGNOSIS — Z95.0 PACEMAKER: Primary | ICD-10-CM

## 2019-02-27 PROCEDURE — 93288 INTERROG EVL PM/LDLS PM IP: CPT | Performed by: INTERNAL MEDICINE

## 2019-03-05 RX ORDER — SODIUM CHLORIDE 450 MG/100ML
INJECTION, SOLUTION INTRAVENOUS CONTINUOUS
Status: CANCELLED | OUTPATIENT
Start: 2019-03-05

## 2019-03-06 ENCOUNTER — HOSPITAL ENCOUNTER (OUTPATIENT)
Dept: GENERAL RADIOLOGY | Age: 84
Discharge: HOME | End: 2019-03-06
Payer: MEDICARE

## 2019-03-06 ENCOUNTER — HOSPITAL ENCOUNTER (OUTPATIENT)
Dept: CT IMAGING | Age: 84
Discharge: HOME | End: 2019-03-06
Payer: MEDICARE

## 2019-03-06 VITALS
OXYGEN SATURATION: 100 % | SYSTOLIC BLOOD PRESSURE: 135 MMHG | DIASTOLIC BLOOD PRESSURE: 80 MMHG | WEIGHT: 143 LBS | HEART RATE: 100 BPM | BODY MASS INDEX: 26.16 KG/M2 | TEMPERATURE: 98.1 F | RESPIRATION RATE: 16 BRPM

## 2019-03-06 DIAGNOSIS — M54.16 LUMBAR RADICULOPATHY: ICD-10-CM

## 2019-03-06 DIAGNOSIS — R52 PAIN: ICD-10-CM

## 2019-03-06 PROCEDURE — 72132 CT LUMBAR SPINE W/DYE: CPT

## 2019-03-06 PROCEDURE — 2709999900 HC NON-CHARGEABLE SUPPLY

## 2019-03-06 PROCEDURE — 6360000004 HC RX CONTRAST MEDICATION: Performed by: PHYSICIAN ASSISTANT

## 2019-03-06 PROCEDURE — 2580000003 HC RX 258: Performed by: RADIOLOGY

## 2019-03-06 PROCEDURE — 62304 MYELOGRAPHY LUMBAR INJECTION: CPT

## 2019-03-06 RX ORDER — SODIUM CHLORIDE 450 MG/100ML
INJECTION, SOLUTION INTRAVENOUS CONTINUOUS
Status: DISCONTINUED | OUTPATIENT
Start: 2019-03-06 | End: 2019-03-07 | Stop reason: HOSPADM

## 2019-03-06 RX ADMIN — IOHEXOL 20 ML: 180 INJECTION INTRAVENOUS at 10:18

## 2019-03-06 RX ADMIN — SODIUM CHLORIDE: 4.5 INJECTION, SOLUTION INTRAVENOUS at 09:35

## 2019-03-06 ASSESSMENT — PAIN SCALES - GENERAL
PAINLEVEL_OUTOF10: 0
PAINLEVEL_OUTOF10: 0

## 2019-03-06 ASSESSMENT — PAIN DESCRIPTION - DESCRIPTORS: DESCRIPTORS: ACHING

## 2019-03-06 ASSESSMENT — PAIN - FUNCTIONAL ASSESSMENT: PAIN_FUNCTIONAL_ASSESSMENT: 0-10

## 2019-03-13 ENCOUNTER — TELEPHONE (OUTPATIENT)
Dept: CARDIOLOGY CLINIC | Age: 84
End: 2019-03-13

## 2019-03-18 RX ORDER — CLOPIDOGREL BISULFATE 75 MG/1
75 TABLET ORAL DAILY
Qty: 30 TABLET | Refills: 3 | Status: SHIPPED | OUTPATIENT
Start: 2019-03-18

## 2019-05-28 RX ORDER — APIXABAN 5 MG/1
TABLET, FILM COATED ORAL
Qty: 180 TABLET | Refills: 3 | Status: SHIPPED | OUTPATIENT
Start: 2019-05-28 | End: 2020-06-29

## 2019-06-16 ENCOUNTER — APPOINTMENT (OUTPATIENT)
Dept: GENERAL RADIOLOGY | Age: 84
DRG: 551 | End: 2019-06-16
Payer: MEDICARE

## 2019-06-16 ENCOUNTER — APPOINTMENT (OUTPATIENT)
Dept: CT IMAGING | Age: 84
DRG: 551 | End: 2019-06-16
Payer: MEDICARE

## 2019-06-16 ENCOUNTER — HOSPITAL ENCOUNTER (INPATIENT)
Age: 84
LOS: 4 days | Discharge: NURSING, REHAB OR LONG-TERM CARE | DRG: 551 | End: 2019-06-20
Attending: ORTHOPAEDIC SURGERY | Admitting: ORTHOPAEDIC SURGERY
Payer: MEDICARE

## 2019-06-16 DIAGNOSIS — T50.905A MEDICATION INDUCED COAGULOPATHY (HCC): ICD-10-CM

## 2019-06-16 DIAGNOSIS — S32.010A CLOSED COMPRESSION FRACTURE OF BODY OF L1 VERTEBRA (HCC): ICD-10-CM

## 2019-06-16 DIAGNOSIS — S52.614A CLOSED NONDISPLACED FRACTURE OF STYLOID PROCESS OF RIGHT ULNA, INITIAL ENCOUNTER: ICD-10-CM
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
## 2019-06-16 DIAGNOSIS — M25.551 RIGHT HIP PAIN: Primary | ICD-10-CM

## 2019-06-16 DIAGNOSIS — D68.9 MEDICATION INDUCED COAGULOPATHY (HCC): ICD-10-CM

## 2019-06-16 DIAGNOSIS — S42.291A OTHER CLOSED DISPLACED FRACTURE OF PROXIMAL END OF RIGHT HUMERUS, INITIAL ENCOUNTER: ICD-10-CM

## 2019-06-16 LAB
ALBUMIN SERPL-MCNC: 3.6 G/DL (ref 3.5–5.1)
ALP BLD-CCNC: 78 U/L (ref 38–126)
ALT SERPL-CCNC: 13 U/L (ref 11–66)
ANION GAP SERPL CALCULATED.3IONS-SCNC: 15 MEQ/L (ref 8–16)
AST SERPL-CCNC: 19 U/L (ref 5–40)
BASOPHILS # BLD: 0.4 %
BASOPHILS ABSOLUTE: 0.1 THOU/MM3 (ref 0–0.1)
BILIRUB SERPL-MCNC: 0.6 MG/DL (ref 0.3–1.2)
BUN BLDV-MCNC: 22 MG/DL (ref 7–22)
CALCIUM SERPL-MCNC: 9.2 MG/DL (ref 8.5–10.5)
CHLORIDE BLD-SCNC: 102 MEQ/L (ref 98–111)
CO2: 24 MEQ/L (ref 23–33)
CREAT SERPL-MCNC: 0.6 MG/DL (ref 0.4–1.2)
EOSINOPHIL # BLD: 0.9 %
EOSINOPHILS ABSOLUTE: 0.1 THOU/MM3 (ref 0–0.4)
ERYTHROCYTE [DISTWIDTH] IN BLOOD BY AUTOMATED COUNT: 14.5 % (ref 11.5–14.5)
ERYTHROCYTE [DISTWIDTH] IN BLOOD BY AUTOMATED COUNT: 52.4 FL (ref 35–45)
GFR SERPL CREATININE-BSD FRML MDRD: > 90 ML/MIN/1.73M2
GLUCOSE BLD-MCNC: 104 MG/DL (ref 70–108)
HCT VFR BLD CALC: 35.3 % (ref 37–47)
HEMOGLOBIN: 11 GM/DL (ref 12–16)
IMMATURE GRANS (ABS): 0.15 THOU/MM3 (ref 0–0.07)
IMMATURE GRANULOCYTES: 1 %
LYMPHOCYTES # BLD: 10.1 %
LYMPHOCYTES ABSOLUTE: 1.5 THOU/MM3 (ref 1–4.8)
MCH RBC QN AUTO: 30.4 PG (ref 26–33)
MCHC RBC AUTO-ENTMCNC: 31.2 GM/DL (ref 32.2–35.5)
MCV RBC AUTO: 97.5 FL (ref 81–99)
MONOCYTES # BLD: 8.9 %
MONOCYTES ABSOLUTE: 1.3 THOU/MM3 (ref 0.4–1.3)
NUCLEATED RED BLOOD CELLS: 0 /100 WBC
OSMOLALITY CALCULATION: 284.9 MOSMOL/KG (ref 275–300)
PLATELET # BLD: 177 THOU/MM3 (ref 130–400)
PMV BLD AUTO: 9.7 FL (ref 9.4–12.4)
POTASSIUM SERPL-SCNC: 3.9 MEQ/L (ref 3.5–5.2)
RBC # BLD: 3.62 MILL/MM3 (ref 4.2–5.4)
SEG NEUTROPHILS: 78.7 %
SEGMENTED NEUTROPHILS ABSOLUTE COUNT: 11.8 THOU/MM3 (ref 1.8–7.7)
SODIUM BLD-SCNC: 141 MEQ/L (ref 135–145)
TOTAL PROTEIN: 7.7 G/DL (ref 6.1–8)
WBC # BLD: 15 THOU/MM3 (ref 4.8–10.8)

## 2019-06-16 PROCEDURE — 72131 CT LUMBAR SPINE W/O DYE: CPT

## 2019-06-16 PROCEDURE — 6360000002 HC RX W HCPCS: Performed by: PHYSICIAN ASSISTANT

## 2019-06-16 PROCEDURE — 96374 THER/PROPH/DIAG INJ IV PUSH: CPT

## 2019-06-16 PROCEDURE — 2580000003 HC RX 258: Performed by: PHYSICIAN ASSISTANT

## 2019-06-16 PROCEDURE — 73060 X-RAY EXAM OF HUMERUS: CPT

## 2019-06-16 PROCEDURE — 72100 X-RAY EXAM L-S SPINE 2/3 VWS: CPT

## 2019-06-16 PROCEDURE — 70450 CT HEAD/BRAIN W/O DYE: CPT

## 2019-06-16 PROCEDURE — 1200000000 HC SEMI PRIVATE

## 2019-06-16 PROCEDURE — 73030 X-RAY EXAM OF SHOULDER: CPT

## 2019-06-16 PROCEDURE — 96375 TX/PRO/DX INJ NEW DRUG ADDON: CPT

## 2019-06-16 PROCEDURE — 2709999900 HC NON-CHARGEABLE SUPPLY

## 2019-06-16 PROCEDURE — 72128 CT CHEST SPINE W/O DYE: CPT

## 2019-06-16 PROCEDURE — 72192 CT PELVIS W/O DYE: CPT

## 2019-06-16 PROCEDURE — 73502 X-RAY EXAM HIP UNI 2-3 VIEWS: CPT

## 2019-06-16 PROCEDURE — 85025 COMPLETE CBC W/AUTO DIFF WBC: CPT

## 2019-06-16 PROCEDURE — 36415 COLL VENOUS BLD VENIPUNCTURE: CPT

## 2019-06-16 PROCEDURE — 73100 X-RAY EXAM OF WRIST: CPT

## 2019-06-16 PROCEDURE — 80053 COMPREHEN METABOLIC PANEL: CPT

## 2019-06-16 PROCEDURE — 73090 X-RAY EXAM OF FOREARM: CPT

## 2019-06-16 PROCEDURE — 99285 EMERGENCY DEPT VISIT HI MDM: CPT

## 2019-06-16 PROCEDURE — 6370000000 HC RX 637 (ALT 250 FOR IP): Performed by: PHYSICIAN ASSISTANT

## 2019-06-16 PROCEDURE — 6370000000 HC RX 637 (ALT 250 FOR IP): Performed by: INTERNAL MEDICINE

## 2019-06-16 RX ORDER — ONDANSETRON 2 MG/ML
4 INJECTION INTRAMUSCULAR; INTRAVENOUS EVERY 6 HOURS PRN
Status: DISCONTINUED | OUTPATIENT
Start: 2019-06-16 | End: 2019-06-20 | Stop reason: HOSPADM

## 2019-06-16 RX ORDER — PANTOPRAZOLE SODIUM 40 MG/1
40 TABLET, DELAYED RELEASE ORAL 2 TIMES DAILY
Status: DISCONTINUED | OUTPATIENT
Start: 2019-06-17 | End: 2019-06-20 | Stop reason: HOSPADM

## 2019-06-16 RX ORDER — ONDANSETRON 2 MG/ML
4 INJECTION INTRAMUSCULAR; INTRAVENOUS ONCE
Status: COMPLETED | OUTPATIENT
Start: 2019-06-16 | End: 2019-06-16

## 2019-06-16 RX ORDER — ROPINIROLE 1 MG/1
1 TABLET, FILM COATED ORAL NIGHTLY
Status: DISCONTINUED | OUTPATIENT
Start: 2019-06-17 | End: 2019-06-20 | Stop reason: HOSPADM

## 2019-06-16 RX ORDER — ALBUTEROL SULFATE 90 UG/1
2 AEROSOL, METERED RESPIRATORY (INHALATION) EVERY 6 HOURS PRN
Status: DISCONTINUED | OUTPATIENT
Start: 2019-06-16 | End: 2019-06-20 | Stop reason: HOSPADM

## 2019-06-16 RX ORDER — NITROGLYCERIN 0.4 MG/1
0.4 TABLET SUBLINGUAL EVERY 5 MIN PRN
Status: DISCONTINUED | OUTPATIENT
Start: 2019-06-16 | End: 2019-06-20 | Stop reason: HOSPADM

## 2019-06-16 RX ORDER — AMLODIPINE BESYLATE 5 MG/1
5 TABLET ORAL DAILY
Status: DISCONTINUED | OUTPATIENT
Start: 2019-06-17 | End: 2019-06-20 | Stop reason: HOSPADM

## 2019-06-16 RX ORDER — M-VIT,TX,IRON,MINS/CALC/FOLIC 27MG-0.4MG
1 TABLET ORAL DAILY
Status: DISCONTINUED | OUTPATIENT
Start: 2019-06-17 | End: 2019-06-20 | Stop reason: HOSPADM

## 2019-06-16 RX ORDER — ATORVASTATIN CALCIUM 40 MG/1
40 TABLET, FILM COATED ORAL NIGHTLY
Status: DISCONTINUED | OUTPATIENT
Start: 2019-06-17 | End: 2019-06-20 | Stop reason: HOSPADM

## 2019-06-16 RX ORDER — HYDROCODONE BITARTRATE AND ACETAMINOPHEN 5; 325 MG/1; MG/1
1 TABLET ORAL EVERY 6 HOURS PRN
Status: DISCONTINUED | OUTPATIENT
Start: 2019-06-16 | End: 2019-06-20 | Stop reason: HOSPADM

## 2019-06-16 RX ORDER — HYDROCODONE BITARTRATE AND ACETAMINOPHEN 5; 325 MG/1; MG/1
2 TABLET ORAL EVERY 6 HOURS PRN
Status: DISCONTINUED | OUTPATIENT
Start: 2019-06-16 | End: 2019-06-20 | Stop reason: HOSPADM

## 2019-06-16 RX ORDER — SODIUM CHLORIDE 9 MG/ML
INJECTION, SOLUTION INTRAVENOUS CONTINUOUS
Status: DISCONTINUED | OUTPATIENT
Start: 2019-06-16 | End: 2019-06-18

## 2019-06-16 RX ORDER — METOPROLOL TARTRATE 50 MG/1
50 TABLET, FILM COATED ORAL 2 TIMES DAILY
Status: DISCONTINUED | OUTPATIENT
Start: 2019-06-17 | End: 2019-06-20 | Stop reason: HOSPADM

## 2019-06-16 RX ORDER — MORPHINE SULFATE 2 MG/ML
2 INJECTION, SOLUTION INTRAMUSCULAR; INTRAVENOUS
Status: DISCONTINUED | OUTPATIENT
Start: 2019-06-16 | End: 2019-06-20 | Stop reason: HOSPADM

## 2019-06-16 RX ADMIN — HYDROMORPHONE HYDROCHLORIDE 0.5 MG: 1 INJECTION, SOLUTION INTRAMUSCULAR; INTRAVENOUS; SUBCUTANEOUS at 14:35

## 2019-06-16 RX ADMIN — SODIUM CHLORIDE: 9 INJECTION, SOLUTION INTRAVENOUS at 14:35

## 2019-06-16 RX ADMIN — ONDANSETRON 4 MG: 2 INJECTION INTRAMUSCULAR; INTRAVENOUS at 14:35

## 2019-06-16 RX ADMIN — HYDROCODONE BITARTRATE AND ACETAMINOPHEN 2 TABLET: 5; 325 TABLET ORAL at 21:52

## 2019-06-16 RX ADMIN — MORPHINE SULFATE 2 MG: 2 INJECTION, SOLUTION INTRAMUSCULAR; INTRAVENOUS at 17:23

## 2019-06-16 RX ADMIN — SODIUM CHLORIDE: 9 INJECTION, SOLUTION INTRAVENOUS at 21:52

## 2019-06-16 ASSESSMENT — PAIN DESCRIPTION - DESCRIPTORS
DESCRIPTORS: ACHING;THROBBING
DESCRIPTORS_2: ACHING;CONSTANT

## 2019-06-16 ASSESSMENT — PAIN SCALES - GENERAL
PAINLEVEL_OUTOF10: 9
PAINLEVEL_OUTOF10: 10
PAINLEVEL_OUTOF10: 6
PAINLEVEL_OUTOF10: 10
PAINLEVEL_OUTOF10: 8

## 2019-06-16 ASSESSMENT — ENCOUNTER SYMPTOMS
EYE PAIN: 0
VOMITING: 0
SHORTNESS OF BREATH: 0
WHEEZING: 0
EYE DISCHARGE: 0
COUGH: 0
BACK PAIN: 1
NAUSEA: 0
PHOTOPHOBIA: 0
SORE THROAT: 0
RHINORRHEA: 0
DIARRHEA: 0
ABDOMINAL PAIN: 0

## 2019-06-16 ASSESSMENT — PAIN DESCRIPTION - PAIN TYPE
TYPE: ACUTE PAIN
TYPE_2: ACUTE PAIN

## 2019-06-16 ASSESSMENT — PAIN DESCRIPTION - FREQUENCY: FREQUENCY: CONTINUOUS

## 2019-06-16 ASSESSMENT — PAIN DESCRIPTION - ORIENTATION
ORIENTATION: RIGHT
ORIENTATION: RIGHT
ORIENTATION_2: LOWER
ORIENTATION: RIGHT

## 2019-06-16 ASSESSMENT — PAIN DESCRIPTION - LOCATION
LOCATION: SHOULDER;HIP
LOCATION: BACK;ARM
LOCATION: ARM;WRIST
LOCATION_2: BACK

## 2019-06-16 ASSESSMENT — PAIN DESCRIPTION - INTENSITY: RATING_2: 8

## 2019-06-16 NOTE — ED PROVIDER NOTES
[facility] EMERGENCY DEPT      CHIEF COMPLAINT       Chief Complaint   Patient presents with    Arm Pain    Hip Pain       Nurses Notes reviewed and I agree except as noted in the HPI. HISTORY OF PRESENT ILLNESS    Rose Crabtree is a 80 y.o. female who presents for the evaluation following a mechanical fall. The patient reports to have fallen 2 days ago in her garage when she tripped over a her weed prem when she extended her right upper extremity to catch her fall and landed on her right side. She states that she was capable of standing following her fall but only with assistance. No head trauma or LOC is reported. Patient was ambulatory following the fall, but not getting around at home well due to pain. She states that she is currently experiencing right upper extremity/right shoulder pain, low back, and right hip pain. Due to continuation of her pain the patient came to the ED for evaluation. She rates her current pain as a 10/10 in severity and aching in character. Her pain is exacerbated secondary to range of motion. Patient denies any headache, neck pain, chest pain, or abdominal pain. The patient denies any diplopia, visual disturbances, blurred vision, or additional neurological symptoms. She is currently anticoagulated with Plavix and Eliquis. The patient reports no additional symptoms or complaints at this time. REVIEW OF SYSTEMS     Review of Systems   Constitutional: Negative for appetite change, chills, fatigue and fever. HENT: Negative for congestion, ear pain, rhinorrhea and sore throat. Eyes: Negative for photophobia, pain, discharge and visual disturbance. Respiratory: Negative for cough, shortness of breath and wheezing. Cardiovascular: Negative for chest pain, palpitations and leg swelling. Gastrointestinal: Negative for abdominal pain, diarrhea, nausea and vomiting. Genitourinary: Negative for difficulty urinating, dysuria, hematuria and vaginal discharge. Musculoskeletal: Positive for arthralgias (right shoulder, right upper extremity, right hip), back pain (lower) and myalgias. Negative for joint swelling and neck pain. Skin: Negative for pallor, rash and wound. Neurological: Negative for dizziness, syncope, weakness, light-headedness, numbness and headaches. Hematological: Negative for adenopathy. Psychiatric/Behavioral: Negative for confusion and suicidal ideas. The patient is not nervous/anxious. PAST MEDICAL HISTORY    has a past medical history of Anxiety, Arthritis, Blood circulation, collateral, Bronchiectasis ([facility] [address] CAD (coronary artery disease), Chronic obstructive lung disease, Depression, Epigastric discomfort, Exertional dyspnea, GERD (gastroesophageal reflux disease), Hernia, History of blood transfusion, History of pneumonia, Irregular heart beat, Joint pain, Pacemaker, Pneumonia, Restless legs syndrome, and SSS (sick sinus syndrome) ([facility] [address] SURGICAL HISTORY      has a past surgical history that includes transthoracic echocardiogram (8 01 2011); cardiovascular stress test (8 23 2010); Cardiac pacemaker placement (2001); Cardiac pacemaker placement (4 13 2010); Diagnostic Cardiac Cath Lab Procedure (8 02 2006); Coronary angioplasty with stent (11/2011); pacemaker placement; Appendectomy; Cholecystectomy; Hysterectomy; Colonoscopy; Endoscopy, colon, diagnostic; back surgery; and joint replacement.     CURRENT MEDICATIONS       Current Discharge Medication List      CONTINUE these medications which have NOT CHANGED    Details   ELIQUIS 5 MG TABS tablet TAKE 1 TABLET TWICE A DAY  Qty: 180 tablet, Refills: 3      clopidogrel (PLAVIX) 75 MG tablet Take 1 tablet by mouth daily  Qty: 30 tablet, Refills: 3      amLODIPine (NORVASC) 10 MG tablet Take 0.5 tablets by mouth daily  Qty: 30 tablet, Refills: 3      atorvastatin (LIPITOR) 40 MG tablet Take 1 tablet by mouth nightly  Qty: 30 tablet, Refills: 3      metoprolol tartrate (LOPRESSOR) 50 MG tablet Take 1 tablet by mouth 2 times daily  Qty: 60 tablet, Refills: 3      pantoprazole (PROTONIX) 40 MG tablet Take 1 tablet by mouth 2 times daily  Qty: 60 tablet, Refills: 3      ciprofloxacin (CIPRO) 750 MG tablet Take 750 mg by mouth 2 times daily On 2 weeks, then off 1 week, repeat (for chronic lung infections)      ropinirole (REQUIP) 0.25 MG tablet Take 1 mg by mouth nightly. Multiple Vitamins-Minerals (PRESERVISION AREDS PO) Take  by mouth daily. nitroGLYCERIN (NITROSTAT) 0.4 MG SL tablet Place 1 tablet under the tongue every 5 minutes as needed for Chest pain up to max of 3 total doses. If no relief after 1 dose, call 911. Qty: 25 tablet, Refills: 3      albuterol-ipratropium (COMBIVENT RESPIMAT)  MCG/ACT AERS inhaler Inhale 1 puff into the lungs every 6 hours as needed  Qty: 3 Inhaler, Refills: 3      furosemide (LASIX) 40 MG tablet Take 40 mg by mouth daily as needed (taking PRN)              ALLERGIES     is allergic to adhesive tape; eggs or egg-derived products; and quinine derivatives. FAMILY HISTORY     indicated that her mother is . She indicated that her father is . She indicated that both of her sisters are . family history includes Cancer in her mother, sister, and sister; Other in her father. SOCIAL HISTORY    reports that she has never smoked. She has never used smokeless tobacco. She reports that she drinks alcohol. She reports that she does not use drugs. PHYSICAL EXAM     INITIAL VITALS:  height is 5' 3\" (1.6 m) and weight is 130 lb (59 kg). Her oral temperature is 97.3 °F (36.3 °C). Her blood pressure is 162/70 (abnormal) and her pulse is 75. Her respiration is 16 and oxygen saturation is 93%. Physical Exam   Constitutional: She is oriented to person, place, and time. Vital signs are normal. She appears well-developed and well-nourished. Non-toxic appearance. No distress. HENT:   Head: Normocephalic and atraumatic.    Right Ear: Hearing normal.   Left Ear: Hearing normal.   Nose: Nose normal. No epistaxis. Mouth/Throat: Oropharynx is clear and moist.   Eyes: Conjunctivae, EOM and lids are normal.   Neck: No neck rigidity. No tracheal deviation present. Cardiovascular: Regular rhythm. Tachycardia present. Pulses:       Radial pulses are 2+ on the right side, and 2+ on the left side. Pulmonary/Chest: Effort normal. No stridor. No tachypnea. No respiratory distress. Abdominal: Soft. She exhibits no distension. Musculoskeletal:        Right shoulder: She exhibits decreased range of motion, tenderness and swelling. She exhibits no bony tenderness and no deformity. Right elbow: Normal.She exhibits normal range of motion and no swelling. No tenderness found. Right wrist: She exhibits tenderness. She exhibits normal range of motion and no bony tenderness. Right hip: Normal. She exhibits normal range of motion, normal strength and no tenderness. Left hip: Normal. She exhibits normal range of motion, normal strength and no tenderness. Cervical back: Normal. She exhibits no tenderness and no bony tenderness. Thoracic back: Normal. She exhibits no tenderness and no bony tenderness. Lumbar back: She exhibits tenderness (right paraspinal). She exhibits no bony tenderness. Right upper arm: She exhibits tenderness and swelling. She exhibits no bony tenderness and no edema. Right forearm: Normal. She exhibits no tenderness and no bony tenderness. Right hand: Normal. She exhibits normal range of motion, no tenderness, normal capillary refill and no swelling. Normal sensation noted. Normal strength noted. Right upper leg: Normal. She exhibits no tenderness and no bony tenderness. Left upper leg: Normal. She exhibits no tenderness and no bony tenderness. Neurological: She is alert and oriented to person, place, and time. She has normal strength.  No sensory deficit. Gait normal. GCS eye subscore is 4. GCS verbal subscore is 5. GCS motor subscore is 6. Skin: Skin is warm, dry and intact. Bruising (right upper arm) noted. No abrasion, no ecchymosis and no rash noted. She is not diaphoretic. No pallor. Psychiatric: She has a normal mood and affect. Her speech is normal and behavior is normal. Thought content normal. Cognition and memory are normal.   Nursing note and vitals reviewed. DIFFERENTIAL DIAGNOSIS:   Including but not limited to: Sprain, strain, fracture, contusion, hematoma, dislocation    DIAGNOSTIC RESULTS     EKG: All EKG's are interpreted by [facility] Phys[facility] who either signs or Co-signs this chart in the absence of a cardiologist.    None    RADIOLOGY: non-plain film images(s) such as CT,Ultrasound and MRI are read by the radiologist.  Plain radiographic images are visualized and preliminarily interpreted by the emergency physician unless otherwise stated below. CT THORACIC SPINE WO CONTRAST   Final Result   L1 vertebral body fracture            **This report has been created using voice recognition software. It may contain minor errors which are inherent in voice recognition technology. **      Final report electronically signed by Dr. Florencia Morel on 6/16/2019 3:35 PM      CT LUMBAR SPINE WO CONTRAST   Final Result   1. Compression fracture L1 vertebral body with 50% loss of body height and no significant canal encroachment. 2. Possible chronic stress fracture involving the right L4 facet            **This report has been created using voice recognition software. It may contain minor errors which are inherent in voice recognition technology. **      Final report electronically signed by Dr. Florencia Morel on 6/16/2019 3:39 PM      CT PELVIS WO CONTRAST Additional Contrast? None   Final Result   No acute fracture. **This report has been created using voice recognition software.   It may contain minor errors which are lateral projection which may represent a small fracture fragment. 2. There is a cortical step-off demonstrated along the dorsal aspect of the ulnar styloid seen on the lateral view consistent with ulnar styloid fracture. **This report has been created using voice recognition software. It may contain minor errors which are inherent in voice recognition technology. **      Final report electronically signed by Dr. Milena Galvan on 6/16/2019 1:54 PM      XR HUMERUS RIGHT (MIN 2 VIEWS)   Final Result   Comminuted displaced fracture of the right humeral neck            **This report has been created using voice recognition software. It may contain minor errors which are inherent in voice recognition technology. **      Final report electronically signed by Dr. Kathlen Cranker on 6/16/2019 1:57 PM      XR WRIST RIGHT (2 VIEWS)   Final Result   1. Nondisplaced fracture through the base of the ulnar styloid. Degenerative changes as described above. **This report has been created using voice recognition software. It may contain minor errors which are inherent in voice recognition technology. **      Final report electronically signed by Dr. Milena Galvan on 6/16/2019 1:56 PM      XR LUMBAR SPINE (2-3 VIEWS)    (Results Pending)       LABS:   Labs Reviewed   CBC WITH AUTO DIFFERENTIAL - Abnormal; Notable for the following components:       Result Value    WBC 15.0 (*)     RBC 3.62 (*)     Hemoglobin 11.0 (*)     Hematocrit 35.3 (*)     MCHC 31.2 (*)     RDW-SD 52.4 (*)     Segs Absolute 11.8 (*)     Immature Grans (Abs) 0.15 (*)     All other components within normal limits   COMPREHENSIVE METABOLIC PANEL   ANION GAP   GLOMERULAR FILTRATION RATE, ESTIMATED   OSMOLALITY       EMERGENCY DEPARTMENT COURSE:   Vitals:    Vitals:    06/16/19 1344 06/16/19 1443 06/16/19 1553 06/16/19 1725   BP:  (!) 127/57  (!) 162/70   Pulse: 81 105 109 75   Resp: 17 17 19 16   Temp:    97.3 °F (36.3 °C)   TempSrc:    Oral   SpO2: 50% loss of body height and no significant canal encroachment. Dr. Anthony Rollins (general/trauma surgery) was consulted and recommended that orthopedics be consulted. Walter Lawrence ([facility]) was consulted and he recommended that the patient's right upper extremity be placed in a sling and that he right wrist be placed in a Velcro splint. He advised that a CT thoracic spine, lumbar spine, and right hip be completed. I re-consulted Walter Lawrence ([facility]) who stated that the patient should be admitted to Dr. Lori Gould (orthopedics). He recommended that the ortho-spine and medicine be consulted. Dr. Mandie Taylor (internal medicine) stated that he would consult the patient's case. Dr. Gina Bah (spine surgery) stated that he would consult the patient. The patient was treated with dilaudid, Zofran, and IV fluids while in the ED. I observed the patient's condition to remain stable during the duration of their stay. Due to the patient's imagine I recommended admission and she was amenable to my decision. The case was discussed with Dr. Lori Gould (orthopedics) who graciously accepts the patient for admission and further evaluation. CRITICAL CARE:   None    CONSULTS:  Dr. Anthony Rollins (general/trauma surgery)  Walter Lawrence ([facility])  Dr. Lori Gould (orthopedics)  Dr. Mandie Taylor (internal medicine)  Dr. Gina Bah (spine surgery)     PROCEDURES:  None    FINAL IMPRESSION      1. Right hip pain    2. Closed compression fracture of body of L1 vertebra (HCC)    3. Fall on same level from slipping, tripping or stumbling, initial encounter    4. Other closed displaced fracture of proximal end of right humerus, initial encounter    5. Closed nondisplaced fracture of styloid process of right ulna, initial encounter    6. Medication induced coagulopathy ([facility] [address]          DISPOSITION/PLAN     1. Right hip pain    2. Closed compression fracture of body of L1 vertebra (HCC)    3.  Fall on same level from slipping, tripping or stumbling, initial encounter    4. Other closed displaced fracture of proximal end of right humerus, initial encounter    5. Closed nondisplaced fracture of styloid process of right ulna, initial encounter    6. Medication induced coagulopathy ([facility] [address]    Admission      (Please note that portions of this note were completed with a voice recognition program.  Efforts were made to edit the dictations but occasionally words are mis-transcribed.)    Scribe:  Juani Santos 6/16/19 2:11 PM Scribing for and in the presence of KIP Evans. Signed by:Lei Coe, 06/16/19 8:01 PM    Provider:  I personally performed the services described in the documentation, reviewed and edited the documentation which was dictated to the scribe in my presence, and itaccurately records my words and actions.     KIP Evans 06/16/19 8:01 PM     KIP Evans  06/16/19 2002

## 2019-06-16 NOTE — ED TRIAGE NOTES
Patient presents to ED with c/o R arm and hip pain. States that she tripped and fell on 6/14/19 and has been progressively feeling worse and now has limited movement. Patient denies hitting her head or any LOC. States that she is on plavix. Call light in reach. Family at bedside.

## 2019-06-16 NOTE — ED NOTES
Pt resting on cot, respires easy and unlabored. Family at bedside. No needs voiced. Will monitor.       Lisa Todd RN  06/16/19 [id number]

## 2019-06-16 NOTE — CONSULTS
Consult in progress. Brace ordered for L1 compression fx. No surgical intervention planned. Full report to follow.

## 2019-06-17 LAB
EKG ATRIAL RATE: 107 BPM
EKG P-R INTERVAL: 168 MS
EKG Q-T INTERVAL: 396 MS
EKG QRS DURATION: 156 MS
EKG QTC CALCULATION (BAZETT): 528 MS
EKG R AXIS: -26 DEGREES
EKG T AXIS: 112 DEGREES
EKG VENTRICULAR RATE: 107 BPM

## 2019-06-17 PROCEDURE — 6370000000 HC RX 637 (ALT 250 FOR IP): Performed by: PHYSICIAN ASSISTANT

## 2019-06-17 PROCEDURE — 1200000000 HC SEMI PRIVATE

## 2019-06-17 PROCEDURE — 93010 ELECTROCARDIOGRAM REPORT: CPT | Performed by: INTERNAL MEDICINE

## 2019-06-17 PROCEDURE — 6370000000 HC RX 637 (ALT 250 FOR IP): Performed by: INTERNAL MEDICINE

## 2019-06-17 PROCEDURE — 6360000002 HC RX W HCPCS: Performed by: INTERNAL MEDICINE

## 2019-06-17 PROCEDURE — L0464 TLSO 4MOD SACRO-SCAP PRE: HCPCS

## 2019-06-17 PROCEDURE — 93005 ELECTROCARDIOGRAM TRACING: CPT | Performed by: INTERNAL MEDICINE

## 2019-06-17 PROCEDURE — L3660 SO 8 AB RSTR CAN/WEB PRE OTS: HCPCS

## 2019-06-17 PROCEDURE — 2580000003 HC RX 258: Performed by: PHYSICIAN ASSISTANT

## 2019-06-17 RX ORDER — DOCUSATE SODIUM 100 MG/1
100 CAPSULE, LIQUID FILLED ORAL 2 TIMES DAILY
Status: DISCONTINUED | OUTPATIENT
Start: 2019-06-17 | End: 2019-06-20 | Stop reason: HOSPADM

## 2019-06-17 RX ORDER — IPRATROPIUM BROMIDE AND ALBUTEROL SULFATE 2.5; .5 MG/3ML; MG/3ML
1 SOLUTION RESPIRATORY (INHALATION)
Status: DISCONTINUED | OUTPATIENT
Start: 2019-06-18 | End: 2019-06-20 | Stop reason: HOSPADM

## 2019-06-17 RX ADMIN — HYDROCODONE BITARTRATE AND ACETAMINOPHEN 2 TABLET: 5; 325 TABLET ORAL at 05:16

## 2019-06-17 RX ADMIN — METOPROLOL TARTRATE 50 MG: 50 TABLET, FILM COATED ORAL at 20:42

## 2019-06-17 RX ADMIN — AMLODIPINE BESYLATE 5 MG: 5 TABLET ORAL at 11:51

## 2019-06-17 RX ADMIN — ATORVASTATIN CALCIUM 40 MG: 40 TABLET, FILM COATED ORAL at 20:42

## 2019-06-17 RX ADMIN — METOPROLOL TARTRATE 50 MG: 50 TABLET, FILM COATED ORAL at 05:17

## 2019-06-17 RX ADMIN — HYDROCODONE BITARTRATE AND ACETAMINOPHEN 2 TABLET: 5; 325 TABLET ORAL at 17:59

## 2019-06-17 RX ADMIN — ENOXAPARIN SODIUM 40 MG: 40 INJECTION SUBCUTANEOUS at 09:03

## 2019-06-17 RX ADMIN — SODIUM CHLORIDE: 9 INJECTION, SOLUTION INTRAVENOUS at 05:19

## 2019-06-17 RX ADMIN — PANTOPRAZOLE SODIUM 40 MG: 40 TABLET, DELAYED RELEASE ORAL at 05:16

## 2019-06-17 RX ADMIN — ROPINIROLE HYDROCHLORIDE 1 MG: 1 TABLET, FILM COATED ORAL at 20:42

## 2019-06-17 RX ADMIN — HYDROCODONE BITARTRATE AND ACETAMINOPHEN 2 TABLET: 5; 325 TABLET ORAL at 11:52

## 2019-06-17 RX ADMIN — PANTOPRAZOLE SODIUM 40 MG: 40 TABLET, DELAYED RELEASE ORAL at 20:42

## 2019-06-17 RX ADMIN — SODIUM CHLORIDE: 9 INJECTION, SOLUTION INTRAVENOUS at 17:09

## 2019-06-17 RX ADMIN — MULTIPLE VITAMINS W/ MINERALS TAB 1 TABLET: TAB at 09:03

## 2019-06-17 ASSESSMENT — PAIN SCALES - GENERAL
PAINLEVEL_OUTOF10: 9
PAINLEVEL_OUTOF10: 7
PAINLEVEL_OUTOF10: 9
PAINLEVEL_OUTOF10: 8
PAINLEVEL_OUTOF10: 6
PAINLEVEL_OUTOF10: 8
PAINLEVEL_OUTOF10: 7
PAINLEVEL_OUTOF10: 8
PAINLEVEL_OUTOF10: 9

## 2019-06-17 ASSESSMENT — PAIN DESCRIPTION - PAIN TYPE
TYPE: ACUTE PAIN

## 2019-06-17 ASSESSMENT — PAIN DESCRIPTION - DESCRIPTORS
DESCRIPTORS: ACHING;CONSTANT
DESCRIPTORS: ACHING

## 2019-06-17 ASSESSMENT — PAIN DESCRIPTION - LOCATION
LOCATION: SHOULDER
LOCATION: ARM;WRIST;HIP
LOCATION: SHOULDER

## 2019-06-17 ASSESSMENT — PAIN DESCRIPTION - ORIENTATION
ORIENTATION: RIGHT

## 2019-06-17 ASSESSMENT — PAIN DESCRIPTION - PROGRESSION: CLINICAL_PROGRESSION: NOT CHANGED

## 2019-06-17 ASSESSMENT — PAIN DESCRIPTION - ONSET: ONSET: ON-GOING

## 2019-06-17 ASSESSMENT — PAIN DESCRIPTION - FREQUENCY: FREQUENCY: INTERMITTENT

## 2019-06-17 ASSESSMENT — PAIN - FUNCTIONAL ASSESSMENT: PAIN_FUNCTIONAL_ASSESSMENT: PREVENTS OR INTERFERES SOME ACTIVE ACTIVITIES AND ADLS

## 2019-06-17 NOTE — PLAN OF CARE
Problem: Falls - Risk of:  Goal: Will remain free from falls  Description  Will remain free from falls  6/17/2019 1231 by VALERIA Gama RN  Outcome: Ongoing  Note:   Pt up with one assist. Patient compliant with using call light to call for assistance with ambulation. Problem: Falls - Risk of:  Goal: Absence of physical injury  Description  Absence of physical injury  Outcome: Ongoing  Note:   Pt free of physical injury. Problem: Pain:  Goal: Pain level will decrease  Description  Pain level will decrease  6/17/2019 1231 by Kathi Gama RN  Outcome: Ongoing  Note:   Pt rates her pain as a 8 out fo 10. Pain goal 5. Patient taking Norco as needed for pain. Problem: Pain:  Goal: Control of acute pain  Description  Control of acute pain  Outcome: Ongoing  Note:   Pt voices pain medication helps reduce her pain. Problem: SAFETY  Goal: Free from accidental physical injury  6/17/2019 1231 by Martin Gama RN  Outcome: Ongoing  Note:   Pt free of physical injury. Problem: DAILY CARE  Goal: Daily care needs are met  6/17/2019 1231 by Mehrdad Middleton RN  Outcome: Ongoing  Note:   Pt able to complete ADL's with moderate assist.     Problem: SKIN INTEGRITY  Goal: Skin integrity is maintained or improved  6/17/2019 1231 by VALERIA Gama RN  Outcome: Ongoing  Note:   Bruising noted to right arm. Problem: KNOWLEDGE DEFICIT  Goal: Patient/S.O. demonstrates understanding of disease process, treatment plan, medications, and discharge instructions. Outcome: Ongoing  Note:   Pt verbalizes understanding of treatment plan. Problem: DISCHARGE BARRIERS  Goal: Patient's continuum of care needs are met  6/17/2019 1231 by Mehrdad Middleton RN  Outcome: Ongoing  Note:   Discharge planning in progress. Patient lives at home alone. Rehab/tcu consult placed for patient today. Care plan reviewed with patient. Patient verbalize understanding of the plan of care and contribute to goal setting.

## 2019-06-17 NOTE — CONSULTS
[email or web address]  Job#: [id number]     Doc#: [id number]    CC:                                            ADDENDUM TO THE ABOVE H&P/CONSULTATION     Cherylene Lobo   YOB: 1935  Account Number: [de-identified]       HISTORY OF PRESENT ILLNESS:  Cherylene Lobo is a 80 y.o. female, admitted on :6/16/2019 12:46 PM      PROBLEM LIST:  Patient Active Problem List   Diagnosis    [employer] dual pacemaker    Restless legs syndrome    Anxiety    Depression    Chronic obstructive lung disease    Irregular heart beat    Joint pain    History of pneumonia    Exertional dyspnea    Epigastric discomfort    Hernia    GERD (gastroesophageal reflux disease)    Abnormal stress test    CAD (coronary artery disease) lad stent     Atrial flutter, paroxysmal (HCC)    Unstable angina ([facility] [address]    ASHD (arteriosclerotic heart disease)    NSTEMI (non-ST elevated myocardial infarction) ([facility] [address]    Pacemaker battery depletion    Closed compression fracture of body of L1 vertebra ([facility])       MEDICATIONS: []None []Unknown  Prior to Admission medications    Medication Sig Start Date End Date Taking?  Authorizing Provider   ELIQUIS 5 MG TABS tablet TAKE 1 TABLET TWICE A DAY 5/28/19  Yes Clementina Mckeon MD   clopidogrel (PLAVIX) 75 MG tablet Take 1 tablet by mouth daily 3/18/19  Yes Clementina Mckeon MD   amLODIPine (NORVASC) 10 MG tablet Take 0.5 tablets by mouth daily 5/30/18  Yes Vannesa Ortega PA-C   atorvastatin (LIPITOR) 40 MG tablet Take 1 tablet by mouth nightly 5/13/18  Yes Vannesa Ortega PA-C   metoprolol tartrate (LOPRESSOR) 50 MG tablet Take 1 tablet by mouth 2 times daily 5/13/18  Yes Ronny Ortega PA-C   pantoprazole (PROTONIX) 40 MG tablet Take 1 tablet by mouth 2 times daily 5/13/18  Yes Ronny Ortega PA-C   ciprofloxacin (CIPRO) 750 MG tablet Take 750 mg by mouth 2 times daily On 2 weeks, then off 1 week, repeat (for chronic lung infections)   Yes Historical Provider, MD   ropinirole (REQUIP) 0.25 MG tablet Oral BID Zack Alvarez MD   50 mg at 06/18/19 0901    therapeutic multivitamin-minerals 1 tablet  1 tablet Oral Daily Zack Alvarez MD   1 tablet at 06/18/19 0901    pantoprazole (PROTONIX) tablet 40 mg  40 mg Oral BID Zack Alvarez MD   40 mg at 06/18/19 0901    nitroGLYCERIN (NITROSTAT) SL tablet 0.4 mg  0.4 mg Sublingual Q5 Min PRN Zack Alvarez MD        rOPINIRole (REQUIP) tablet 1 mg  1 mg Oral Nightly Zack Alvarez MD   1 mg at 06/17/19 2042    albuterol sulfate  (90 Base) MCG/ACT inhaler 2 puff  2 puff Inhalation Q6H PRN Zack Alvarez MD        And    ipratropium (ATROVENT HFA) 17 MCG/ACT inhaler 2 puff  2 puff Inhalation Q6H PRN Mery Kam MD              HYDROcodone 5 mg - acetaminophen 1 tablet Q6H PRN   Or     HYDROcodone 5 mg - acetaminophen 2 tablet Q6H PRN   morphine 2 mg Q2H PRN   ondansetron 4 mg Q6H PRN   nitroGLYCERIN 0.4 mg Q5 Min PRN   albuterol sulfate HFA 2 puff Q6H PRN   And     ipratropium 2 puff Q6H PRN        sodium chloride 50 mL/hr at 06/18/19 0223         ALLERGIES: []None []Unknown   Adhesive tape; Eggs or egg-derived products; and Quinine derivatives    PAST MEDICAL  HISTORY: []None []Unknown    has a past medical history of Anxiety, Arthritis, Blood circulation, collateral, Bronchiectasis ([facility] [address] CAD (coronary artery disease), Chronic obstructive lung disease, Depression, Epigastric discomfort, Exertional dyspnea, GERD (gastroesophageal reflux disease), Hernia, History of blood transfusion, History of pneumonia, Irregular heart beat, Joint pain, Pacemaker, Pneumonia, Restless legs syndrome, and SSS (sick sinus syndrome) ([address] [address] PAST SURGICAL  HISTORY: []None []Unknown   has a past surgical history that includes transthoracic echocardiogram (8 01 2011); cardiovascular stress test (8 23 2010); Cardiac pacemaker placement (2001); Cardiac pacemaker placement (4 13 2010); Diagnostic Cardiac Cath Lab Procedure (8 02 2006);  Coronary angioplasty with stent

## 2019-06-17 NOTE — PROGRESS NOTES
Patient arrived to 7K02 via bed from ED. She has right humerus fracture, right wrist sprain, and L1 compression fracture. Sling is in place to right arm and elevated on pillow. She has 0.9 infusing to gravity in RAC. Bruising noted to right upper arm. No oxygen on at this time. Patient alert and oriented x4. She has call light in reach and no questions at this time.

## 2019-06-17 NOTE — PLAN OF CARE
Problem: SAFETY  Goal: Free from accidental physical injury  Outcome: Met This Shift  Note:   Bed low, call light in reach, pathway clear, patient offered bathroom every 2 hours, slipper socks when up      Problem: Falls - Risk of:  Goal: Will remain free from falls  Description  Will remain free from falls  Outcome: Ongoing  Note:   Bed low, call light in reach, ;bed alarm on, slipper socks on, patient has broken humerus on left as well as left wrist sprain and possible hip fracture. Patient uses call light appropriately for assistance      Problem: Pain:  Goal: Pain level will decrease  Description  Pain level will decrease  Outcome: Ongoing  Note:   Patient stated pain goal is 4/10, patient taking oral pain meds to help her reach her goal as well as elevating extremity and using ice, and repositioning for back pain, patient rests after pain meds given. Problem: DAILY CARE  Goal: Daily care needs are met  Outcome: Ongoing  Note:   Patient right wrist sprained, right humerus fractured, possible right hip fracture. Patient unable to assist with much adl's. She assist with what she can, otherwise she needs assistance, patient able to verbalize needs      Problem: SKIN INTEGRITY  Goal: Skin integrity is maintained or improved  Outcome: Ongoing  Note:   Skin assessed this shift, skin intact, patient makes adjustments in bed to prevent breakdown     Problem: DISCHARGE BARRIERS  Goal: Patient's continuum of care needs are met  Outcome: Ongoing  Note:   Patient from home, patient hoping to get back home at discharge, no discharge orders at this time.       Problem: Pain:  Goal: Control of chronic pain  Description  Control of chronic pain  Outcome: Completed     Problem: PAIN  Goal: Patient's pain/discomfort is manageable  Outcome: Completed  Note:   Duplicate   Problem: SAFETY  Goal: Free from intentional harm  Outcome: Completed  Note:   Patient denies suicidal/homicidal ideations      Problem: Pain:  Goal: Control of chronic pain  Description  Control of chronic pain  Outcome: Completed  Care plan reviewed with patient. Patient verbalize understanding of the plan of care and contribute to goal setting.

## 2019-06-17 NOTE — CARE COORDINATION
6/17/19, 9:15 AM      Diandra Johnson       Admitted from: ED 6/16/2019/ [facility] [address] day: 1   Location: -02/002-A Reason for admit: Closed compression fracture of body of L1 vertebra ([facility] [address] [S32.010A] Status: inpatient  Admit order signed?: yes  PMH:  has a past medical history of Anxiety, Arthritis, Blood circulation, collateral, Bronchiectasis ([facility] [address] CAD (coronary artery disease), Chronic obstructive lung disease, Depression, Epigastric discomfort, Exertional dyspnea, GERD (gastroesophageal reflux disease), Hernia, History of blood transfusion, History of pneumonia, Irregular heart beat, Joint pain, Pacemaker, Pneumonia, Restless legs syndrome, and SSS (sick sinus syndrome) ([facility] [address] Procedure: no  Pertinent abnormal Imaging:L1 compression fx, right humerus- distal fx of proximal end, right ulna fx  Medications:  Scheduled Meds:   amLODIPine  5 mg Oral Daily    atorvastatin  40 mg Oral Nightly    metoprolol tartrate  50 mg Oral BID    therapeutic multivitamin-minerals  1 tablet Oral Daily    pantoprazole  40 mg Oral BID    rOPINIRole  1 mg Oral Nightly    enoxaparin  40 mg Subcutaneous Daily     Continuous Infusions:   sodium chloride 100 mL/hr at 06/17/19 5279      Pertinent Info/Orders/Treatment Plan: Non- operative management. N/V checks. Pain management. TLSO Back brace ordered. Need PT/OT orders with brace on. NWB right arm   Diet: DIET GENERAL;   Smoking status:  reports that she has never smoked.  She has never used smokeless tobacco.   PCP: Alonzo Dorman MD  Readmission: no  Readmission Risk Score: 11%    Discharge Planning  Current Residence:  Private Residence  Living Arrangements:  Alone   Support Systems:  None  Current Services PTA:     Potential Assistance Needed:  Transitional Care, Home Care  Potential Assistance Purchasing Medications:  No  Does patient want to participate in local refill/ meds to beds program?  No  Type of Home Care Services:  None  Patient expects to be discharged to:

## 2019-06-17 NOTE — H&P
History and Physical        CHIEF COMPLAINT:  Right proximal humerus fracture, right ulna styloid fracture    HISTORY OF PRESENT ILLNESS:      The patient is a 80 y.o. female with multiple medical comorbidities who ortho was consulted for the above noted complaints. She is right hand dominant. Patient endorses a mechanical fall 2 days ago while walking out of her garage. She states she fell forward on an outstretched arm. She states she was able to work with the arm but had progressive pain and weakness which prompted her to get checked. She denies hitting her head and no LOC. She lives alone and is active. She is on Eliquis. X-rays reviewed and confirm the fractures. Past Medical History:    Past Medical History:   Diagnosis Date    Anxiety     Arthritis     Blood circulation, collateral     Bronchiectasis (HCC)     CAD (coronary artery disease)     Chronic obstructive lung disease     Depression     Epigastric discomfort     Lower substernal and epigastric discomfort, etiology uncertain (probably not cardiac.)     Exertional dyspnea     Patient has considerable exertional dyspnea from her obstructive lung disease.  GERD (gastroesophageal reflux disease)     Hernia     History of blood transfusion     History of pneumonia     History of freqeunt episodes of pneumonia as a child.  Irregular heart beat     Joint pain     Pacemaker     S/P dual-chamber [employer] pacemaker implantation (symptomatic bradycardia probably related to AV block.)     Pneumonia     Restless legs syndrome     SSS (sick sinus syndrome) ([address]        Past Surgical History:    Past Surgical History:   Procedure Laterality Date    APPENDECTOMY      BACK SURGERY      CARDIAC PACEMAKER PLACEMENT  2001    CARDIAC PACEMAKER PLACEMENT  4 13 2010    Dual-chamber pulse generator removal. Attempted extraction of atrial and ventricular leads. Insertion of new ventricular lead.  Insertion of new dual-chamber pulse generator (atrial lead capped.)     CARDIOVASCULAR STRESS TEST  8 23 2010    Gated study showed normal LV function. EF is 64%. Iosotope scan in short axis showed homogeneous uptake. Mild anterior wall attenuation in breasts but no reversibility to suggest ischemia. TID was normal at 0.90 and ischemic score was 0. No ischemia or infarction.  CHOLECYSTECTOMY      COLONOSCOPY      CORONARY ANGIOPLASTY WITH STENT PLACEMENT  11/2011    Dr. Radha Young LAB PROCEDURE  8 02 2006    It is right dominant coronary supply. LV was performed. The EF estimated is 60% with mild inferior wall hypokinesis. LV pressure is 139/6 mmHg, LVEDP 23 mmHg, aortic pressure of 138/71 mmHg. There was no gradient across the aortic valve. There was a suggestion of increased wall thickness of the ventricle.  ENDOSCOPY, COLON, DIAGNOSTIC      HYSTERECTOMY      JOINT REPLACEMENT      left knee    PACEMAKER PLACEMENT      TRANSTHORACIC ECHOCARDIOGRAM  8 01 2011    Size was normal. Systolic function was normal. EF was estimated in the range of 55-65%. There were no regional wall motion abnormalities. Wall thickness was normal. Doppler - LV diastolic function parameters were normal. RV was mildly dilated. There was mild mitral valve annular calcification and mild MR. Medications Prior to Admission:   Prior to Admission medications    Medication Sig Start Date End Date Taking?  Authorizing Provider   ELIQUIS 5 MG TABS tablet TAKE 1 TABLET TWICE A DAY 5/28/19  Yes Megan Ceron MD   clopidogrel (PLAVIX) 75 MG tablet Take 1 tablet by mouth daily 3/18/19  Yes Megan eCron MD   amLODIPine (NORVASC) 10 MG tablet Take 0.5 tablets by mouth daily 5/30/18  Yes Juan Carlos Ortega PA-C   atorvastatin (LIPITOR) 40 MG tablet Take 1 tablet by mouth nightly 5/13/18  Yes Juan Carlos Ortega PA-C   metoprolol tartrate (LOPRESSOR) 50 MG tablet Take 1 tablet by mouth 2 times daily 5/13/18  Yes Juan Carlos Ortega PA-C   pantoprazole (PROTONIX) 40 MG tablet Take 1 tablet by mouth 2 times daily 5/13/18  Yes Ronny Ortega PA-C   ciprofloxacin (CIPRO) 750 MG tablet Take 750 mg by mouth 2 times daily On 2 weeks, then off 1 week, repeat (for chronic lung infections)   Yes Historical Provider, MD   ropinirole (REQUIP) 0.25 MG tablet Take 1 mg by mouth nightly. Yes Historical Provider, MD   Multiple Vitamins-Minerals (PRESERVISION AREDS PO) Take  by mouth daily. Yes Historical Provider, MD   nitroGLYCERIN (NITROSTAT) 0.4 MG SL tablet Place 1 tablet under the tongue every 5 minutes as needed for Chest pain up to max of 3 total doses. If no relief after 1 dose, call 911. 5/13/18   Erma Qureshi PA-C   albuterol-ipratropium (COMBIVENT RESPIMAT)  MCG/ACT AERS inhaler Inhale 1 puff into the lungs every 6 hours as needed 12/6/17   Richard Sloan, APRN - CNP   furosemide (LASIX) 40 MG tablet Take 40 mg by mouth daily as needed (taking PRN)     Historical Provider, MD    Scheduled Meds:   amLODIPine  5 mg Oral Daily    atorvastatin  40 mg Oral Nightly    metoprolol tartrate  50 mg Oral BID    therapeutic multivitamin-minerals  1 tablet Oral Daily    pantoprazole  40 mg Oral BID    rOPINIRole  1 mg Oral Nightly    enoxaparin  40 mg Subcutaneous Daily     Continuous Infusions:   sodium chloride 100 mL/hr at 06/17/19 0519     PRN Meds:. HYDROcodone 5 mg - acetaminophen **OR** HYDROcodone 5 mg - acetaminophen, morphine, ondansetron, nitroGLYCERIN, albuterol sulfate HFA **AND** ipratropium **AND** [CANCELED] MDI Treatment    Allergies:  Adhesive tape; Eggs or egg-derived products; and Quinine derivatives    Social History:   Social History     Socioeconomic History    Marital status:       Spouse name: None    Number of children: 0    Years of education: None    Highest education level: None   Occupational History    None   Social Needs    Financial resource strain: None    Food insecurity:     Worry: None     Inability: None Denies any dizziness, paresthesia or weakness. PHYSICAL EXAM:  Patient Vitals for the past 24 hrs:   BP Temp Temp src Pulse Resp SpO2 Height Weight   06/17/19 0854 -- -- -- -- -- 94 % -- --   06/17/19 0851 (!) 100/58 99.1 °F (37.3 °C) Oral 83 18 (!) 88 % -- --   06/17/19 0514 -- -- -- -- -- 92 % -- --   06/17/19 0512 (!) 117/58 98.2 °F (36.8 °C) Oral 85 18 (!) 85 % -- --   06/16/19 2141 -- -- -- -- -- 94 % -- --   06/16/19 2137 133/60 99 °F (37.2 °C) Oral 115 16 (!) 88 % -- --   06/16/19 1725 (!) 162/70 97.3 °F (36.3 °C) Oral 75 16 93 % -- --   06/16/19 1553 -- -- -- 109 19 94 % -- --   06/16/19 1443 (!) 127/57 -- -- 105 17 96 % -- --   06/16/19 1344 -- -- -- 81 17 98 % -- --   06/16/19 1250 (!) 159/80 98.1 °F (36.7 °C) Oral 84 18 97 % 5' 3\" (1.6 m) 130 lb (59 kg)     General appearance:  Alert and oriented x 3. No apparent distress, appears stated age and cooperative. HEENT:  Normal cephalic, atraumatic without obvious deformity. Pupils equal, round, and reactive to light. Conjunctivae/corneas clear. Neck: Supple, with full range of motion. Trachea midline. Respiratory:  Normal respiratory effort. No audible Wheezes or Rhonchi. Cardiovascular:  Regular rate and rhythm. Abdomen: Soft, non-tender, non-distended. Musculoskeletal:   Right shoulder skin intact, bruising and swelling noted. Diffuse TTP. Flex and extends fingers, wrist, and elbow. Right wrist bruising and swelling, decreased ROM secondary to pain, splint intact. NV and sensation intact. Skin: Skin color, texture, turgor normal.  No rashes or lesions. Neurologic:  Neurovascularly intact without any focal sensory/motor deficits. Sensation intact.    Capillary Refill: Brisk,< 3 seconds   Peripheral Pulses: +2 palpable, equal bilaterally    DATA:  CBC:   Lab Results   Component Value Date    WBC 15.0 06/16/2019    HGB 11.0 06/16/2019     06/16/2019     BMP:    Lab Results   Component Value Date     06/16/2019    K 3.9 06/16/2019    K 4.6 02/20/2019     06/16/2019    CO2 24 06/16/2019    BUN 22 06/16/2019    CREATININE 0.6 06/16/2019    CALCIUM 9.2 06/16/2019    GLUCOSE 104 06/16/2019    GLUCOSE 109 12/01/2011     PT/INR:    Lab Results   Component Value Date    PROTIME 1.20 07/20/2011    INR 1.17 02/20/2019     Troponin:  No results found for: TROPONINI  No results for input(s): LIPASE, AMYLASE in the last 72 hours. Recent Labs     06/16/19  1612   AST 19   ALT 13   BILITOT 0.6   ALKPHOS 78       Radiology:  Xr Lumbar Spine (2-3 Views)    Result Date: 6/16/2019  PROCEDURE: XR LUMBAR SPINE (2-3 VIEWS) CLINICAL INFORMATION: pain COMPARISON: No prior study. TECHNIQUE:  Lumbar spine 2 views  FINDINGS: 2 views of the lumbar spine were obtained. There is age-indeterminate superior endplate compression deformity demonstrated at the L1 vertebral body with approximately 40% loss of vertebral body height centrally. There is diffuse osteopenia. There is straightening of the normal lumbar lordosis. 1. Age-indeterminate superior endplate compression deformity at L1 measuring approximately 40%. Correlate clinically for point tenderness at this location. **This report has been created using voice recognition software. It may contain minor errors which are inherent in voice recognition technology. ** Final report electronically signed by Dr. Mauricio Rosales on 6/16/2019 1:57 PM    Xr Shoulder Right (min 2 Views)    Result Date: 6/16/2019  PROCEDURE: XR HUMERUS RIGHT (MIN 2 VIEWS), XR SHOULDER RIGHT (MIN 2 VIEWS) CLINICAL INFORMATION: pain . COMPARISON: No prior study. TECHNIQUE: AP and lateral projections of the right humerus and two projections of the right shoulder FINDINGS: Comminuted displaced fracture of the surgical neck of the humerus with comminuted fracture involving the humeral head. Fracture is impacted and there is medial displacement of the distal fracture fragment. Humeral head maintains alignment with the glenoid fossa.  Surrounding bony structures are intact. There is severe osteopenia. Comminuted displaced fracture of the right humeral neck **This report has been created using voice recognition software. It may contain minor errors which are inherent in voice recognition technology. ** Final report electronically signed by Dr. Arian Parker on 6/16/2019 1:57 PM    Xr Humerus Right (min 2 Views)    Result Date: 6/16/2019  PROCEDURE: XR HUMERUS RIGHT (MIN 2 VIEWS), XR SHOULDER RIGHT (MIN 2 VIEWS) CLINICAL INFORMATION: pain . COMPARISON: No prior study. TECHNIQUE: AP and lateral projections of the right humerus and two projections of the right shoulder FINDINGS: Comminuted displaced fracture of the surgical neck of the humerus with comminuted fracture involving the humeral head. Fracture is impacted and there is medial displacement of the distal fracture fragment. Humeral head maintains alignment with the glenoid fossa. Surrounding bony structures are intact. There is severe osteopenia. Comminuted displaced fracture of the right humeral neck **This report has been created using voice recognition software. It may contain minor errors which are inherent in voice recognition technology. ** Final report electronically signed by Dr. Arian Parker on 6/16/2019 1:57 PM    Xr Radius Ulna Right (2 Views)    Result Date: 6/16/2019  PROCEDURE: XR RADIUS ULNA RIGHT (2 VIEWS) CLINICAL INFORMATION: pain COMPARISON: No prior study. TECHNIQUE:  Right forearm 2 views  FINDINGS: There is a small curvilinear ossific density projecting ventral to the medial and lateral projection which may represent a small fracture fragment. There is also mild spurring along the olecranon. There is a cortical step-off demonstrated at the dorsal aspect of the ulnar styloid seen on the lateral view. 1. There is a small curvilinear ossific density projecting ventral to the medial and lateral projection which may represent a small fracture fragment.  2. There is a cortical step-off demonstrated along the dorsal aspect of the ulnar styloid seen on the lateral view consistent with ulnar styloid fracture. **This report has been created using voice recognition software. It may contain minor errors which are inherent in voice recognition technology. ** Final report electronically signed by Dr. Dom Bowman on 6/16/2019 1:54 PM    Xr Wrist Right (2 Views)    Result Date: 6/16/2019  PROCEDURE: XR WRIST RIGHT (2 VIEWS) CLINICAL INFORMATION: pain COMPARISON: No prior study. TECHNIQUE:  Right wrist 2 views  FINDINGS: There is a nondisplaced fracture through the base of the ulnar styloid. No other acute fractures identified. There is joint space narrowing within the carpal bones on the scaphoid, trapezium and trapezoid carpal bones. There is also arthrosis at the first carpometacarpal joint space. 1. Nondisplaced fracture through the base of the ulnar styloid. Degenerative changes as described above. **This report has been created using voice recognition software. It may contain minor errors which are inherent in voice recognition technology. ** Final report electronically signed by Dr. Dom Bowman on 6/16/2019 1:56 PM    Ct Head Wo Contrast    Result Date: 6/16/2019  PROCEDURE: CT HEAD WO CONTRAST CLINICAL INFORMATION: fall . COMPARISON: No prior study. TECHNIQUE: 2-D multiplanar noncontrast images of the brain All CT scans at this facility use dose modulation, iterative reconstruction, and/or weight-based dosing when appropriate to reduce radiation dose to as low as reasonably achievable. FINDINGS: There is no infarction or hemorrhage. No extra-axial collection. Mild diffuse age-related cerebral volume loss is present. Very subtle subcortical white matter diminished attenuation compatible proximal vessel ischemic disease. Ventricles are normal. There is no midline shift or mass effect. Calvarium is intact.  There are postsurgical changes of the left temporal bone with the fluid attenuation in the colon is left mastoid air cells. Left internal auditory canal is opacified with fluid attenuation surrounding the accessory ossicles. Postsurgical changes of the maxillary sinuses with the chronic hypertrophic changes of the maxillary sinus bones. 1. No acute intracranial pathology 2. Extensive chronic and possibly superimposed acute changes with the extensive left mastoid effusions and sclerosis and possible acute otitis interna. Chronic maxillary sinus disease and postsurgical changes. **This report has been created using voice recognition software. It may contain minor errors which are inherent in voice recognition technology. ** Final report electronically signed by Dr. Stefani Ortiz on 6/16/2019 3:29 PM    Ct Thoracic Spine Wo Contrast    Result Date: 6/16/2019  PROCEDURE: CT THORACIC SPINE WO CONTRAST CLINICAL INFORMATION: Trauma evaluate for fracture . COMPARISON: No prior study. TECHNIQUE: 2-D multiplanar noncontrast images of the thoracic spine. All CT scans at this facility use dose modulation, iterative reconstruction, and/or weight-based dosing when appropriate to reduce radiation dose to as low as reasonably achievable. FINDINGS: Motion artifact limits some of the evaluation. There is a fracture of L1 with 10% loss vertebral body height minimal encroachment on the ventral thecal sac. Fracture involving the vertebral body only. There is dextroscoliosis. No thoracic vertebral body fracture is identified. There is no encroachment on the thoracic canal. The visualized portion of the lung fields show no infiltrates or evidence of pneumothorax. There is left basilar bronchiectatic changes in no evidence of effusion. L1 vertebral body fracture **This report has been created using voice recognition software. It may contain minor errors which are inherent in voice recognition technology. ** Final report electronically signed by Dr. Stefani Ortiz on 6/16/2019 3:35 PM    Ct Lumbar Spine Wo Contrast    Result Date: 6/16/2019  PROCEDURE: CT LUMBAR SPINE WO CONTRAST CLINICAL INFORMATION: Fall evaluate compression fracture of L1, fall, trauma back pain . COMPARISON: 3/6/2019 TECHNIQUE: 2-D multiplanar noncontrast images of the lumbar spine All CT scans at this facility use dose modulation, iterative reconstruction, and/or weight-based dosing when appropriate to reduce radiation dose to as low as reasonably achievable. FINDINGS: Compression fracture of L1 with approximately 15% loss of vertebral body height no significant retropulsion. Minimal mass effect on the ventral thecal sac. The remaining vertebral bodies show no evidence of fracture or acute bony malalignment. There is hypertrophic sclerosis involving the right L4 facet chronic degenerative changes. Small nondisplaced stress fracture is not excludable. This does not have acute features. There is central canal stenosis at L4-5 due to facet hypertrophy and bulging disc and ligamentum flavum hypertrophy     1. Compression fracture L1 vertebral body with 50% loss of body height and no significant canal encroachment. 2. Possible chronic stress fracture involving the right L4 facet **This report has been created using voice recognition software. It may contain minor errors which are inherent in voice recognition technology. ** Final report electronically signed by Dr. Sarita Martines on 6/16/2019 3:39 PM    Ct Pelvis Wo Contrast Additional Contrast? None    Result Date: 6/16/2019  PROCEDURE: CT PELVIS WO CONTRAST CLINICAL INFORMATION: Fall, pain, evaluate possible hip fracture . COMPARISON: No prior study. TECHNIQUE: 2-D multiplanar noncontrast images of the pelvis. Bone and soft tissue windows. All CT scans at this facility use dose modulation, iterative reconstruction, and/or weight-based dosing when appropriate to reduce radiation dose to as low as reasonably achievable. FINDINGS: No acute fracture or bone destruction is identified.  The cortical irregularity seen on the standard radiograph of the right femoral neck is again identified and is consistent with degenerative change. Bones are osteopenic. Intrapelvic findings include severe uncomplicated colonic diverticulosis. Urinary bladder is mildly distended. No abnormal fluid collections are seen within the pelvis. No acute fracture. **This report has been created using voice recognition software. It may contain minor errors which are inherent in voice recognition technology. ** Final report electronically signed by Dr. Newton Davis on 6/16/2019 3:43 PM    Xr Hip 2-3 Vw W Pelvis Right    Result Date: 6/16/2019  PROCEDURE: XR HIP 2-3 VW W PELVIS RIGHT CLINICAL INFORMATION: pain . COMPARISON: No prior study. TECHNIQUE: AP pelvis and AP and frog-leg projections of the right hip FINDINGS: Degenerative narrowing of the right hip. Urinary left hip. cortical offset and irregularity at the subcapital space along the superior margin of the right femoral neck concerning for a subtle subcapital fracture. Acetabular protrusio with narrowing of the joint space. Diffuse osteopenia. SI joints are intact     Findings suspicious for subtle subcapital fracture right femoral neck **This report has been created using voice recognition software. It may contain minor errors which are inherent in voice recognition technology. ** Final report electronically signed by Dr. Newton Davis on 6/16/2019 1:55 PM      ASSESSMENT:Active Problems:    Closed compression fracture of body of L1 vertebra ([address]  Resolved Problems:    * No resolved hospital problems. *     Closed displaced comminuted right proximal humerus fracture. Closed non-displaced ulna styloid fracture. PLAN as discussed with Dr. Isaías Munson:  Non-operative management of the right proximal humerus and ulna fractures. Continue wrist splint.   Sling to right arm  PT/OT  Physiatry consult for rehab vs TCU        Electronically signed by ALBINO Reyes CNP on 6/17/2019 at 9:27 AM

## 2019-06-18 ENCOUNTER — TELEPHONE (OUTPATIENT)
Dept: CARDIOLOGY CLINIC | Age: 84
End: 2019-06-18

## 2019-06-18 PROCEDURE — 6370000000 HC RX 637 (ALT 250 FOR IP): Performed by: PHYSICIAN ASSISTANT

## 2019-06-18 PROCEDURE — 97163 PT EVAL HIGH COMPLEX 45 MIN: CPT

## 2019-06-18 PROCEDURE — 6370000000 HC RX 637 (ALT 250 FOR IP): Performed by: INTERNAL MEDICINE

## 2019-06-18 PROCEDURE — 94640 AIRWAY INHALATION TREATMENT: CPT

## 2019-06-18 PROCEDURE — 97167 OT EVAL HIGH COMPLEX 60 MIN: CPT

## 2019-06-18 PROCEDURE — 2700000000 HC OXYGEN THERAPY PER DAY

## 2019-06-18 PROCEDURE — 97535 SELF CARE MNGMENT TRAINING: CPT

## 2019-06-18 PROCEDURE — 97110 THERAPEUTIC EXERCISES: CPT

## 2019-06-18 PROCEDURE — 1200000000 HC SEMI PRIVATE

## 2019-06-18 PROCEDURE — 94760 N-INVAS EAR/PLS OXIMETRY 1: CPT

## 2019-06-18 RX ADMIN — IPRATROPIUM BROMIDE AND ALBUTEROL SULFATE 1 AMPULE: .5; 3 SOLUTION RESPIRATORY (INHALATION) at 16:35

## 2019-06-18 RX ADMIN — APIXABAN 5 MG: 5 TABLET, FILM COATED ORAL at 09:01

## 2019-06-18 RX ADMIN — DOCUSATE SODIUM 100 MG: 100 CAPSULE, LIQUID FILLED ORAL at 02:23

## 2019-06-18 RX ADMIN — APIXABAN 5 MG: 5 TABLET, FILM COATED ORAL at 02:22

## 2019-06-18 RX ADMIN — MULTIPLE VITAMINS W/ MINERALS TAB 1 TABLET: TAB at 09:01

## 2019-06-18 RX ADMIN — HYDROCODONE BITARTRATE AND ACETAMINOPHEN 2 TABLET: 5; 325 TABLET ORAL at 16:04

## 2019-06-18 RX ADMIN — PANTOPRAZOLE SODIUM 40 MG: 40 TABLET, DELAYED RELEASE ORAL at 09:01

## 2019-06-18 RX ADMIN — HYDROCODONE BITARTRATE AND ACETAMINOPHEN 1 TABLET: 5; 325 TABLET ORAL at 02:23

## 2019-06-18 RX ADMIN — DOCUSATE SODIUM 100 MG: 100 CAPSULE, LIQUID FILLED ORAL at 22:03

## 2019-06-18 RX ADMIN — HYDROCODONE BITARTRATE AND ACETAMINOPHEN 2 TABLET: 5; 325 TABLET ORAL at 09:01

## 2019-06-18 RX ADMIN — IPRATROPIUM BROMIDE AND ALBUTEROL SULFATE 1 AMPULE: .5; 3 SOLUTION RESPIRATORY (INHALATION) at 13:03

## 2019-06-18 RX ADMIN — APIXABAN 5 MG: 5 TABLET, FILM COATED ORAL at 22:03

## 2019-06-18 RX ADMIN — DOCUSATE SODIUM 100 MG: 100 CAPSULE, LIQUID FILLED ORAL at 09:01

## 2019-06-18 RX ADMIN — ATORVASTATIN CALCIUM 40 MG: 40 TABLET, FILM COATED ORAL at 22:03

## 2019-06-18 RX ADMIN — AMLODIPINE BESYLATE 5 MG: 5 TABLET ORAL at 09:01

## 2019-06-18 RX ADMIN — IPRATROPIUM BROMIDE AND ALBUTEROL SULFATE 1 AMPULE: .5; 3 SOLUTION RESPIRATORY (INHALATION) at 21:14

## 2019-06-18 RX ADMIN — ROPINIROLE HYDROCHLORIDE 1 MG: 1 TABLET, FILM COATED ORAL at 22:03

## 2019-06-18 RX ADMIN — METOPROLOL TARTRATE 50 MG: 50 TABLET, FILM COATED ORAL at 09:01

## 2019-06-18 RX ADMIN — PANTOPRAZOLE SODIUM 40 MG: 40 TABLET, DELAYED RELEASE ORAL at 22:04

## 2019-06-18 RX ADMIN — IPRATROPIUM BROMIDE AND ALBUTEROL SULFATE 1 AMPULE: .5; 3 SOLUTION RESPIRATORY (INHALATION) at 09:27

## 2019-06-18 ASSESSMENT — PAIN SCALES - GENERAL
PAINLEVEL_OUTOF10: 0
PAINLEVEL_OUTOF10: 8
PAINLEVEL_OUTOF10: 7
PAINLEVEL_OUTOF10: 8
PAINLEVEL_OUTOF10: 6
PAINLEVEL_OUTOF10: 8

## 2019-06-18 ASSESSMENT — PAIN DESCRIPTION - LOCATION
LOCATION: SHOULDER
LOCATION: ARM;SHOULDER
LOCATION: ARM;SHOULDER

## 2019-06-18 ASSESSMENT — PAIN DESCRIPTION - PAIN TYPE
TYPE: ACUTE PAIN

## 2019-06-18 ASSESSMENT — PAIN - FUNCTIONAL ASSESSMENT: PAIN_FUNCTIONAL_ASSESSMENT: PREVENTS OR INTERFERES SOME ACTIVE ACTIVITIES AND ADLS

## 2019-06-18 ASSESSMENT — PAIN DESCRIPTION - PROGRESSION: CLINICAL_PROGRESSION: NOT CHANGED

## 2019-06-18 ASSESSMENT — PAIN DESCRIPTION - ORIENTATION
ORIENTATION: RIGHT

## 2019-06-18 ASSESSMENT — PAIN DESCRIPTION - DESCRIPTORS: DESCRIPTORS: ACHING

## 2019-06-18 ASSESSMENT — PAIN DESCRIPTION - FREQUENCY: FREQUENCY: CONTINUOUS

## 2019-06-18 ASSESSMENT — PAIN DESCRIPTION - ONSET: ONSET: ON-GOING

## 2019-06-18 NOTE — PROGRESS NOTES
INTERNAL MEDICINE Progress Note  6/18/2019 10:30 AM  Subjective:   Admit Date: 6/16/2019  PCP: Curt Matt MD  Interval History:   No new c/o    Objective:   Vitals: BP (!) 115/57   Pulse 105   Temp 98.3 °F (36.8 °C) (Oral)   Resp 18   Ht 5' 3\" (1.6 m)   Wt 130 lb (59 kg)   SpO2 93%   BMI 23.03 kg/m²   General appearance: alert and cooperative with exam  HEENT: Head: atraumatic  Neck: no adenopathy, no carotid bruit and no JVD  Lungs: diminished breath sounds bilaterally and rales bibasilar  Heart: S1, S2 normal  Abdomen: soft, non-tender; bowel sounds normal; no masses,  no organomegaly  Extremities: no edema, redness or tenderness in the calves or thighs and sling to rt arm  Neurologic: Mental status: Alert, oriented, thought content appropriate      Medications:   Scheduled Meds:   apixaban  5 mg Oral BID    ipratropium-albuterol  1 ampule Inhalation Q4H WA    docusate sodium  100 mg Oral BID    amLODIPine  5 mg Oral Daily    atorvastatin  40 mg Oral Nightly    metoprolol tartrate  50 mg Oral BID    therapeutic multivitamin-minerals  1 tablet Oral Daily    pantoprazole  40 mg Oral BID    rOPINIRole  1 mg Oral Nightly     Continuous Infusions:   sodium chloride 50 mL/hr at 06/18/19 0223       Lab Results:   CBC:   Recent Labs     06/16/19  1612   WBC 15.0*   HGB 11.0*        BMP:    Recent Labs     06/16/19  1612      K 3.9      CO2 24   BUN 22   CREATININE 0.6   GLUCOSE 104     Hepatic:   Recent Labs     06/16/19  1612   AST 19   ALT 13   BILITOT 0.6   ALKPHOS 78       Assessment and Plan:   1. Fall with fracture rt proximal humerus  2. Fracture L1 vertebral body  3. COPD  4. Bronchiectasis  5. Acute on chronic hypoxemic resp failure  6. RLS     Analgesics, bowel regimen. Bronchodilators. Oxygen prn  Am labs.     Curt Matt MD

## 2019-06-18 NOTE — CONSULTS
Hospital Medicine Consult    Patient:  Severo Minion  MRN: [id number]    Referring Physician: Dr Nishant Chavez  Reason for Consult: medical management, COPD/ bronchiectasis  Primacy Care Physician: Juan Manuel Aj MD    HISTORY OF PRESENT ILLNESS:   The patient is a 80 y.o. female  Admitted following a fall at home. From the fall, she fractured her rt humerus and her Lumbar vertebra. Patient denies head trauma, no HA, no focal neck pain, no CP, no palpitations. She has a h/o COPD/ bronchiectasis, SSS sp PPM.    Past Medical History:        Diagnosis Date    Anxiety     Arthritis     Blood circulation, collateral     Bronchiectasis (HCC)     CAD (coronary artery disease)     Chronic obstructive lung disease     Depression     Epigastric discomfort     Lower substernal and epigastric discomfort, etiology uncertain (probably not cardiac.)     Exertional dyspnea     Patient has considerable exertional dyspnea from her obstructive lung disease.  GERD (gastroesophageal reflux disease)     Hernia     History of blood transfusion     History of pneumonia     History of freqeunt episodes of pneumonia as a child.  Irregular heart beat     Joint pain     Pacemaker     S/P dual-chamber [employer] pacemaker implantation (symptomatic bradycardia probably related to AV block.)     Pneumonia     Restless legs syndrome     SSS (sick sinus syndrome) ([facility] [address]        Past Surgical History:        Procedure Laterality Date    APPENDECTOMY      BACK SURGERY      CARDIAC PACEMAKER PLACEMENT  2001    CARDIAC PACEMAKER PLACEMENT  4 13 2010    Dual-chamber pulse generator removal. Attempted extraction of atrial and ventricular leads. Insertion of new ventricular lead. Insertion of new dual-chamber pulse generator (atrial lead capped.)     CARDIOVASCULAR STRESS TEST  8 23 2010    Gated study showed normal LV function. EF is 64%. Iosotope scan in short axis showed homogeneous uptake.  Mild anterior wall attenuation in breasts but no reversibility to suggest ischemia. TID was normal at 0.90 and ischemic score was 0. No ischemia or infarction.  CHOLECYSTECTOMY      COLONOSCOPY      CORONARY ANGIOPLASTY WITH STENT PLACEMENT  11/2011    Dr. Eddy Sorenson LAB PROCEDURE  8 02 2006    It is right dominant coronary supply. LV was performed. The EF estimated is 60% with mild inferior wall hypokinesis. LV pressure is 139/6 mmHg, LVEDP 23 mmHg, aortic pressure of 138/71 mmHg. There was no gradient across the aortic valve. There was a suggestion of increased wall thickness of the ventricle.  ENDOSCOPY, COLON, DIAGNOSTIC      HYSTERECTOMY      JOINT REPLACEMENT      left knee    PACEMAKER PLACEMENT      TRANSTHORACIC ECHOCARDIOGRAM  8 01 2011    Size was normal. Systolic function was normal. EF was estimated in the range of 55-65%. There were no regional wall motion abnormalities. Wall thickness was normal. Doppler - LV diastolic function parameters were normal. RV was mildly dilated. There was mild mitral valve annular calcification and mild MR. Medications: Scheduled Meds:   amLODIPine  5 mg Oral Daily    atorvastatin  40 mg Oral Nightly    metoprolol tartrate  50 mg Oral BID    therapeutic multivitamin-minerals  1 tablet Oral Daily    pantoprazole  40 mg Oral BID    rOPINIRole  1 mg Oral Nightly    enoxaparin  40 mg Subcutaneous Daily     Continuous Infusions:   sodium chloride 100 mL/hr at 06/17/19 1709     PRN Meds:. HYDROcodone 5 mg - acetaminophen **OR** HYDROcodone 5 mg - acetaminophen, morphine, ondansetron, nitroGLYCERIN, albuterol sulfate HFA **AND** ipratropium **AND** [CANCELED] MDI Treatment    Allergies:  Adhesive tape; Eggs or egg-derived products; and Quinine derivatives    Social History:   TOBACCO:   reports that she has never smoked. She has never used smokeless tobacco.  ETOH:   reports that she drinks alcohol.       Family History:       Problem Relation Age of Onset    Cancer Mother         lung-non smoker    Other Father         \"strangled on an ulcer that burst\"    Cancer Sister         throat    Cancer Sister         melanoma       Physical Exam:    Vitals: BP (!) 117/56   Pulse 95   Temp 98.9 °F (37.2 °C) (Oral)   Resp 16   Ht 5' 3\" (1.6 m)   Wt 130 lb (59 kg)   SpO2 91%   BMI 23.03 kg/m²   General appearance: alert, appears stated age and cooperative  Skin: Skin color, texture, turgor normal. No rashes or lesions  HEENT: Head: atraumatic  Neck: no adenopathy, no carotid bruit and no JVD  Lungs: diminished breath sounds bilaterally and rales bibasilar  Heart: S1, S2 normal  Abdomen: soft, non-tender; bowel sounds normal; no masses,  no organomegaly  Extremities: no edema, redness or tenderness in the calves or thighs, rt arm in sling  Neurologic: Mental status: Alert, oriented, thought content appropriate    CBC:   Recent Labs     19  1612   WBC 15.0*   HGB 11.0*        BMP:    Recent Labs     19  1612      K 3.9      CO2 24   BUN 22   CREATININE 0.6   GLUCOSE 104     Hepatic:   Recent Labs     19  1612   AST 19   ALT 13   BILITOT 0.6   ALKPHOS 78     XR HUMERUS RIGHT (MIN 2 VIEWS), XR SHOULDER RIGHT (MIN 2 VIEWS  Comminuted displaced fracture of the right humeral neck     CT L spine  1. Compression fracture L1 vertebral body with 50% loss of body height and no significant canal encroachment. 2. Possible chronic stress fracture involving the right L4 facet           CT head:  1. No acute intracranial pathology   2. Extensive chronic and possibly superimposed acute changes with the extensive left mastoid effusions and sclerosis and possible acute otitis interna.  Chronic maxillary sinus disease and postsurgical changes.         EK19     Ventricular Rate 107 BPM    Atrial Rate 107 BPM    P-R Interval 168 ms    QRS Duration 156 ms    Q-T Interval 396 ms    QTc Calculation (Bazett) 528 ms    R Axis -26 degrees    T Axis 112 degrees   Narrative:     Atrial-sensed ventricular-paced rhythm  Abnormal ECG         Assessment and Plan   1. Fall with fracture rt proximal humerus  2. Fracture L1 vertebral body  3. COPD  4. Bronchiectasis  5. Acute on chronic hypoxemic resp failure  6. RLS    Analgesics, bowel regimen. Bronchodilators. Oxygen prn  Am labs.       Patient Active Problem List   Diagnosis Code    [employer] dual pacemaker Z95.0    Restless legs syndrome G25.81    Anxiety F41.9    Depression F32.9    Chronic obstructive lung disease J44.9    Irregular heart beat I49.9    Joint pain M25.50    History of pneumonia Z87.01    Exertional dyspnea R06.09    Epigastric discomfort     Hernia K46.9    GERD (gastroesophageal reflux disease) K21.9    Abnormal stress test R94.39    CAD (coronary artery disease) lad stent  I25.10    Atrial flutter, paroxysmal ([facility]) I48.92    Unstable angina ([facility]) I20.0    ASHD (arteriosclerotic heart disease) I25.10    NSTEMI (non-ST elevated myocardial infarction) ([facility]) I21.4    Pacemaker battery depletion Z45.010    Closed compression fracture of body of L1 vertebra ([facility]) S32.010A       Cherelle Tompkins MD  Consulting Internist.  6/17/2019

## 2019-06-18 NOTE — CARE COORDINATION
DISCHARGE BARRIERS  6/18/19, 1:33 PM    Reason for Referral:  ecf   Mental Status:  Alert, oriented   Decision Making:  Makes own decisions   Family/Social/Home Environment:  Star Reinoso lives at home alone. She has been managing her own care until this admit. She has family support, but does not have anyone to stay with her at home. She is planning ecf for a rehab stay. Current Services:  None   Current Equipment: none   Payment Source: medicare   Concerns or Barriers to Discharge:  None   Collabrative List of ECF/HH were provided: declined list, prefers [facility]     Teach Back Method used with patient regarding care plan and discharge plan  Patient  verbalizes understanding of the plan of care and contributes to goal setting. Anticipated Needs/Discharge Plan:  Spoke with Star Reinoso about discharge plan. She is requesting [facility]. Referral completed to [facility]. Facility is reviewing for possible admission.       Electronically signed by ROSITA Steele on 6/18/2019 at 1:33 PM

## 2019-06-18 NOTE — TELEPHONE ENCOUNTER
Ai from [facility] called the office. Pt is in the hospital and is scheduled for a [facility] [employer] dual pacemaker device check in office here tomorrow. I called Cole with [address] and he told me to contact the nurse on the floor and have her put an order in and then they can contact the company and it will be checked. Gave ai [facility] [employer] phone number to get a hold of a rep.  I MADE PT A NEW DOWNLOAD SCHEDULE AND MADE HER  A NEW IF OFFICE APPT AND MAILED IT TO HER

## 2019-06-18 NOTE — PROGRESS NOTES
Orthopaedic Progress Note      SUBJECTIVE:    Chief Complaint   Patient presents with    Arm Pain    Hip Pain     Hospital Day #2 right humerus fracture, right ulna styloid fracture  Pain well controlled. Denies new complaints from an ortho standpoint. Physical    Vitals:    06/18/19 0851   BP:    Pulse:    Resp:    Temp:    SpO2: 93%         OBJECTIVE  Right arm bruising and swelling. Flex and extends fingers and wrist. Wrist splint intact. SILT and NV intact.        Data  CBC:   Lab Results   Component Value Date    WBC 15.0 06/16/2019    RBC 3.62 06/16/2019    RBC 3.63 12/01/2011    HGB 11.0 06/16/2019    HCT 35.3 06/16/2019    MCV 97.5 06/16/2019    MCH 30.4 06/16/2019    MCHC 31.2 06/16/2019    RDW 15.0 05/10/2018     06/16/2019    MPV 9.7 06/16/2019     BMP:    Lab Results   Component Value Date     06/16/2019    K 3.9 06/16/2019    K 4.6 02/20/2019     06/16/2019    CO2 24 06/16/2019    BUN 22 06/16/2019    LABALBU 3.6 06/16/2019    LABALBU 4.5 11/30/2011    CREATININE 0.6 06/16/2019    CALCIUM 9.2 06/16/2019    LABGLOM >90 06/16/2019    GLUCOSE 104 06/16/2019    GLUCOSE 109 12/01/2011     Uric Acid:  No components found for: URIC  PT/INR:    Lab Results   Component Value Date    PROTIME 1.20 07/20/2011    INR 1.17 02/20/2019     PTT:    Lab Results   Component Value Date    APTT 31.8 02/20/2019   [APTT  Troponin:  No results found for: TROPONINI  Urine Culture:  No components found for: CURINE    Current Inpatient Medications    Current Facility-Administered Medications: apixaban (ELIQUIS) tablet 5 mg, 5 mg, Oral, BID  ipratropium-albuterol (DUONEB) nebulizer solution 1 ampule, 1 ampule, Inhalation, Q4H WA  docusate sodium (COLACE) capsule 100 mg, 100 mg, Oral, BID  HYDROcodone-acetaminophen (NORCO) 5-325 MG per tablet 1 tablet, 1 tablet, Oral, Q6H PRN **OR** HYDROcodone-acetaminophen (NORCO) 5-325 MG per tablet 2 tablet, 2 tablet, Oral, Q6H PRN  morphine (PF) injection 2 mg, 2 mg, Intravenous, Q2H PRN  ondansetron (ZOFRAN) injection 4 mg, 4 mg, Intravenous, Q6H PRN  amLODIPine (NORVASC) tablet 5 mg, 5 mg, Oral, Daily  atorvastatin (LIPITOR) tablet 40 mg, 40 mg, Oral, Nightly  metoprolol tartrate (LOPRESSOR) tablet 50 mg, 50 mg, Oral, BID  therapeutic multivitamin-minerals 1 tablet, 1 tablet, Oral, Daily  pantoprazole (PROTONIX) tablet 40 mg, 40 mg, Oral, BID  nitroGLYCERIN (NITROSTAT) SL tablet 0.4 mg, 0.4 mg, Sublingual, Q5 Min PRN  rOPINIRole (REQUIP) tablet 1 mg, 1 mg, Oral, Nightly  albuterol sulfate  (90 Base) MCG/ACT inhaler 2 puff, 2 puff, Inhalation, Q6H PRN **AND** ipratropium (ATROVENT HFA) 17 MCG/ACT inhaler 2 puff, 2 puff, Inhalation, Q6H PRN **AND** [CANCELED] MDI Treatment, , , 4x daily    .     ASSESSMENT AND PLAN    NWB RUE  Continue sling and splint  PT/OT  Waiting for ECF placement

## 2019-06-18 NOTE — PLAN OF CARE
Problem: Falls - Risk of:  Goal: Will remain free from falls  Description  Will remain free from falls  Outcome: Ongoing  Note:   Bed low, call light in reach, up with 1 assist, bed alarm on, patient uses call light appropriately     Problem: Pain:  Goal: Pain level will decrease  Description  Pain level will decrease  Outcome: Ongoing  Note:   Patient stated pain goal is 5/10, patient taking oral pain medication to help her reach her pain goa. Patient verbalizes adequate relief after pain medication given      Problem: SAFETY  Goal: Free from accidental physical injury  Outcome: Ongoing  Note:   Bed low, slipper socks when up, bed alarm on, fall sign in place, patient calls out for assistance      Problem: DAILY CARE  Goal: Daily care needs are met  Outcome: Ongoing  Note:   Patient has fractured right humerus, and sprained right wrist, and possible fracture to right hip, patient needs moderate to max assist with adl's      Problem: DAILY CARE  Goal: Daily care needs are met  Outcome: Ongoing  Note:   Patient has fractured right humerus, and sprained right wrist, and possible fracture to right hip, patient needs moderate to max assist with adl's      Problem: SKIN INTEGRITY  Goal: Skin integrity is maintained or improved  Outcome: Ongoing  Note:   Skin intact, skin assessed this shift, patient able to make adjustments in bed to help prevent skin breakdown, assist patient with turns and using pillow to off load      Problem: DISCHARGE BARRIERS  Goal: Patient's continuum of care needs are met  Outcome: Ongoing  Note:   Possible rehab/tcu or ecf for rehab before return home  Care plan reviewed with patient. Patient verbalize understanding of the plan of care and contribute to goal setting.

## 2019-06-18 NOTE — PROGRESS NOTES
[address]  INPATIENT PHYSICAL THERAPY  EVALUATION  [facility] ORTHOPEDICS 7K - 7K-02/002-A    Time In: 4189  Time Out: 1215  Timed Code Treatment Minutes: 10 Minutes  Minutes: 17          Date: 2019  Patient Name: Roman Sanders,  Gender:  female        MRN: [id number]  : 1935  (80 y.o.)      Referring Practitioner: Josafat Sinclair APRN-CNP  Diagnosis: Closed compression fx of body of L1 vertebrae  Additional Pertinent Hx: Pt admitted following a fall 2 days ago in which she fell onto a out stretched Right UE causing humerus and ulna fx and a L1 vert fx . Past Medical History:   Diagnosis Date    Anxiety     Arthritis     Blood circulation, collateral     Bronchiectasis (HCC)     CAD (coronary artery disease)     Chronic obstructive lung disease     Depression     Epigastric discomfort     Lower substernal and epigastric discomfort, etiology uncertain (probably not cardiac.)     Exertional dyspnea     Patient has considerable exertional dyspnea from her obstructive lung disease.  GERD (gastroesophageal reflux disease)     Hernia     History of blood transfusion     History of pneumonia     History of freqeunt episodes of pneumonia as a child.  Irregular heart beat     Joint pain     Pacemaker     S/P dual-chamber [employer] pacemaker implantation (symptomatic bradycardia probably related to AV block.)     Pneumonia     Restless legs syndrome     SSS (sick sinus syndrome) ([facility] [address]      Past Surgical History:   Procedure Laterality Date    APPENDECTOMY      BACK SURGERY      CARDIAC PACEMAKER PLACEMENT      CARDIAC PACEMAKER PLACEMENT  2010    Dual-chamber pulse generator removal. Attempted extraction of atrial and ventricular leads. Insertion of new ventricular lead. Insertion of new dual-chamber pulse generator (atrial lead capped.)     CARDIOVASCULAR STRESS TEST  8 2010    Gated study showed normal LV function. EF is 64%.  Iosotope scan in short axis showed Pain:  Yes. Pain Assessment  Pain Assessment: 0-10  Pain Level: 7  Pain Type: Acute pain  Pain Location: Arm; Shoulder  Pain Orientation: Right       Social/Functional History:    Lives With: Alone  Type of Home: House  Home Layout: Two level, Able to Live on Main level with bedroom/bathroom  Home Equipment: Wheelchair-manual, Rolling walker     Bathroom Shower/Tub: Tub/Shower unit  Bathroom Toilet: Standard  Bathroom Equipment: (none)    Receives Help From: Neighbor  ADL Assistance: Independent  Homemaking Assistance: Independent  Ambulation Assistance: Independent  Transfer Assistance: Independent    Active : Yes  Leisure & Hobbies: watch TV, play games on ipad, work in yard         Objective:       RLE AROM: WFL         LLE AROM : WFL       Strength RLE: WFL    Strength LLE: WFL         Sensation  Overall Sensation Status: WFL    Balance  Sitting - Static: Good  Sitting - Dynamic: Good, -  Standing - Static: Fair  Standing - Dynamic: Fair, -    Sit to Supine: Minimal assistance  Scooting: Stand by assistance    Transfers  Sit to Stand: Contact guard assistance(from chair with hemiwalker)  Stand to sit: Contact guard assistance(to bed )       Ambulation 1  Surface: level tile  Device: Hemiwalker  Assistance: Minimal assistance(of 1)  Quality of Gait: tactile cues for karon walker placement ,unsteadiness minimally  Gait Deviations: Slow Kinsey, Decreased step length, Deviated path, Decreased step height  Distance: 20 feet                 Exercises:  Comments: Pt instructed in 10 reps seated marches, long arc quads and ankle pumps to improve strength for function          Activity Tolerance:  Activity Tolerance: Patient Tolerated treatment well  Activity Tolerance:      Treatment Initiated: see there ex   Assessment:   Body structures, Functions, Activity limitations: Decreased functional mobility , Decreased strength, Decreased balance  Assessment: Pt very motivated , was very active prior to her fall a couple days ago . Pt is NWB on right UE and has a L1 comp fx which causes pain and she is required to wear a TLSO before getting out of bed . So needs help with that. Pt with unsteadiness with heniwalker so needs continued therapy to improve safety with gait  Prognosis: Good    Clinical Presentation: High - Unstable with Unpredictable Characteristics: Pt very motivated , was very active prior to her fall a couple days ago . Pt is NWB on right UE and has a L1 comp fx which causes pain and she is required to wear a TLSO before getting out of bed . So needs help with that.  Pt with unsteadiness with heniwalker so needs continued therapy to improve safety with gait:    Decision Making: High Complexitybased on patient assessment and decision making process of determining plan of care and establishing reasonable expectations for measurable functional outcomes    REQUIRES PT FOLLOW UP: Yes    Discharge Recommendations:  Discharge Recommendations: [address], Patient would benefit from continued therapy after discharge    Patient Education:  Patient Education: POC, ther ex    Equipment Recommendations:  Equipment Needed: No(ECF can assess , she can use their hemiwalker)    Safety:  Type of devices: Gait belt, Call light within reach, Left in bed, Nurse notified, Bed alarm in place, All fall risk precautions in place  Restraints  Initially in place: No    Plan:  Times per week: 5 X O  Times per day: Daily  Current Treatment Recommendations: Strengthening, Functional Mobility Training, Balance Training, Transfer Training, Endurance Training, Gait Training    Goals:  Patient goals : Go to [facility] rehab  Short term goals  Time Frame for Short term goals: by discharge  Short term goal 1: supine to sit and return with SBA to get in and out of bed   Short term goal 2: sit to stand with SBA to get on and off various surfaces  Short term goal 3: Ambulate with hemiwalker 50 feet with SBA to walk household distances  Long term goals  Time Frame for Long term goals : NA due to short ELOS    Evaluation Complexity: Based on the findings of patient history, examination, clinical presentation, and decision making during this evaluation, the evaluation of Ruthie Cos  is of high complexity.

## 2019-06-18 NOTE — PROGRESS NOTES
Elpidio Moses 60  INPATIENT OCCUPATIONAL THERAPY  [facility] ORTHOPEDICS 7K  EVALUATION    Time:   Time In: 82  Time Out: 1136  Timed Code Treatment Minutes: 40 Minutes  Minutes: 51          Date: 2019  Patient Name: Mj Young,   Gender: female      MRN: [id number]  : 1935  (80 y.o.)  Referring Practitioner: ALBINO Kuhn CNP  Diagnosis: Closed compression fracture of body of L1 vertebra  Additional Pertinent Hx: Pt admitted following a fall 2 days ago in which she fell onto a out stretched Right UE causing humerus and ulna fx and a L1 vert fx     Restrictions/Precautions:  Restrictions/Precautions: Weight Bearing, General Precautions, Fall Risk  Right Upper Extremity Weight Bearing: Non Weight Bearing  Right Upper Extremity Brace/Splint: sling and swath for fx humerus and ulna and wrist splint  Required Braces or Orthoses  Spinal: Thoracic Lumbar Sacral Orthotics  Spinal Other: on when OOB  Right Upper Extremity Brace/Splint: Sling  Right Upper Extremity Brace/Splint: sling and swath for fx humerus and ulna and wrist splint  Position Activity Restriction  Other position/activity restrictions: TLSO on when OOB , NWB right UE    Subjective  Chart Reviewed: Yes  Patient assessed for rehabilitation services?: Yes    Subjective: Pleasant and cooperative, emotional at end of session with therapeutic listening provided  Comments: RN ok'd OT    Pain:  Pain Assessment  Patient Currently in Pain: Yes  Pain Assessment: 0-10  Pain Level: 6  Pain Type: Acute pain  Pain Location: Arm; Shoulder  Pain Orientation: Right    Social/Functional History:  Lives With: Alone  Type of Home: House  Home Layout: Two level, Able to Live on Main level with bedroom/bathroom  Home Equipment: Wheelchair-manual, Rolling walker   Bathroom Shower/Tub: Tub/Shower unit  Bathroom Toilet: Standard  Bathroom Equipment: (none)    Receives Help From: Neighbor  ADL Assistance: Independent  Homemaking Assistance: Independent  Ambulation Assistance: Independent  Transfer Assistance: Independent    Active : Yes  Leisure & Hobbies: watch TV, play games on ipad, work in yard       Cognition/Orientation:  Overall Orientation Status: Within Functional Limits  Overall Cognitive Status: WFL    ADL;s:  Feeding: Setup(lunch tray)  Grooming: Minimal assistance(for hand hygiene)  UE Dressing: Verbal cueing, Increased time to complete, Dependent/Total(donning TLSO, sling and swathe)  LE Dressing: Maximum assistance(adjusting socks)  Toileting: Increased time to complete, Moderate assistance(CGA for hygiene, assist for clothing up and down)     Pt required significantly extended time for all ADL tasks and frequent rest breaks d/t inc pain.     Functional Mobility:  Bed mobility  Supine to Sit: Minimal assistance(at trunk)  Sit to Supine: Minimal assistance  Scooting: Contact guard assistance(to EOB)    Functional Mobility  Functional - Mobility Device: No device  Activity: To/from bathroom  Assist Level: Minimal assistance(min to mod A)  Functional Mobility Comments: no AD used with significant unsteadiness to/from bathroom, no LOB however constant hand held assist     Balance:  Balance  Sitting Balance: Stand by assistance  Standing Balance: Contact guard assistance  Standing Balance  Time: 1 min x4 events  Activity: within ADLs    Transfers:  Sit to stand: Minimal assistance  Stand to sit: Minimal assistance  Toilet Transfers  Toilet - Technique: Ambulating  Equipment Used: Grab bars  Toilet Transfer: Minimal assistance    Upper Extremity Assessment:   LUE AROM : WNL  RUE General PROM: n/t 2/2 fractures    LUE Strength  L Hand General: 4/5  RUE Strength  RUE Strength Comment: able to make fist    Sensation  Overall Sensation Status: WFL       Activity Tolerance: Patient limited by pain, Patient limited by fatigue       Assessment:  Assessment: Pt would continue to benefit from skilled OT intervention to maximize pt safety and independence with performing self care tasks and functional mobility and to ensure safe transition to the next level of care and return to PLOF. Performance deficits / Impairments: Decreased functional mobility , Decreased ADL status, Decreased strength, Decreased safe awareness, Decreased balance, Decreased endurance, Decreased high-level IADLs  Prognosis: Good, Fair  REQUIRES OT FOLLOW UP: Yes    Treatment Initiated: Treatment and education initiated within context of evaluation. Evaluation time included review of current medical information, gathering information related to past medical, social and functional history, completion of standardized testing, formal and informal observation of tasks, assessment of data and development of plan of care and goals. Treatment time included skilled education and facilitation of tasks to increase safety and independence with ADL's for improved functional independence and quality of life.     Discharge Recommendations:  Subacute/Skilled Nursing Facility    Patient Education:  Patient Education: OT role, POC, goals, NWB RUE, TLSO, sling swathe, wrist splint, ADLs, t/f training    Equipment Recommendations:  Equipment Needed: No  Other: defer    Plan:  Times per week: 5x  Current Treatment Recommendations: Strengthening, Functional Mobility Training, Balance Training, Endurance Training, Home Management Training, Self-Care / ADL, Patient/Caregiver Education & Training, Safety Education & Training, Pain Management    Goals:  Patient goals : to returnt to independent PLOF  Short term goals  Time Frame for Short term goals: 2 weeks  Short term goal 1: Pt to complete functional mobility to/from  and various ADL transfers at [facility] with 0 cues for safety for inc indep with toileting  Short term goal 2: Pt to demonstrate standing tolerance greater than 3 mins with no UE support at consistent CGA for inc indep with grooming tasks  Short term goal 3: Pt to demonstrate adaptive dressing techniques completing UB dressing at mod A  Long term goals  Time Frame for Long term goals : NA d/t ELOS    Following session, patient left in safe position with all fall risk precautions in place.

## 2019-06-18 NOTE — CARE COORDINATION
6/18/19, 3:03 PM    [facility] does not have a bed available for patient. Discussed with Constantino Gupta, who does not have a third choice at this time, after TCU and [facility]. She is considering going home instead with home health. List of ecfs provided for patient to consider a third choice.

## 2019-06-19 ENCOUNTER — PROCEDURE VISIT (OUTPATIENT)
Dept: CARDIOLOGY CLINIC | Age: 84
End: 2019-06-19
Payer: MEDICARE

## 2019-06-19 DIAGNOSIS — Z95.0 PACEMAKER: Primary | ICD-10-CM

## 2019-06-19 LAB
ABSOLUTE RETIC #: 41 THOU/MM3 (ref 20–115)
ANION GAP SERPL CALCULATED.3IONS-SCNC: 10 MEQ/L (ref 8–16)
BUN BLDV-MCNC: 16 MG/DL (ref 7–22)
CALCIUM SERPL-MCNC: 8.7 MG/DL (ref 8.5–10.5)
CHLORIDE BLD-SCNC: 102 MEQ/L (ref 98–111)
CO2: 28 MEQ/L (ref 23–33)
CREAT SERPL-MCNC: 0.5 MG/DL (ref 0.4–1.2)
ERYTHROCYTE [DISTWIDTH] IN BLOOD BY AUTOMATED COUNT: 14.3 % (ref 11.5–14.5)
ERYTHROCYTE [DISTWIDTH] IN BLOOD BY AUTOMATED COUNT: 50.5 FL (ref 35–45)
FERRITIN: 98 NG/ML (ref 10–291)
FOLATE: > 20 NG/ML (ref 4.8–24.2)
GFR SERPL CREATININE-BSD FRML MDRD: > 90 ML/MIN/1.73M2
GLUCOSE BLD-MCNC: 121 MG/DL (ref 70–108)
HCT VFR BLD CALC: 22.8 % (ref 37–47)
HCT VFR BLD CALC: 23.3 % (ref 37–47)
HEMOGLOBIN: 7.2 GM/DL (ref 12–16)
HEMOGLOBIN: 7.3 GM/DL (ref 12–16)
IMMATURE RETIC FRACT: 16.5 % (ref 3–15.9)
IRON: 20 UG/DL (ref 50–170)
MCH RBC QN AUTO: 30.9 PG (ref 26–33)
MCHC RBC AUTO-ENTMCNC: 31.6 GM/DL (ref 32.2–35.5)
MCV RBC AUTO: 97.9 FL (ref 81–99)
PLATELET # BLD: 152 THOU/MM3 (ref 130–400)
PMV BLD AUTO: 9.9 FL (ref 9.4–12.4)
POTASSIUM SERPL-SCNC: 3.9 MEQ/L (ref 3.5–5.2)
RBC # BLD: 2.33 MILL/MM3 (ref 4.2–5.4)
RETIC HEMOGLOBIN: 26.7 PG (ref 28.2–35.7)
RETICULOCYTE ABSOLUTE COUNT: 1.7 % (ref 0.5–2)
SODIUM BLD-SCNC: 140 MEQ/L (ref 135–145)
VITAMIN B-12: 1620 PG/ML (ref 211–911)
WBC # BLD: 6.7 THOU/MM3 (ref 4.8–10.8)

## 2019-06-19 PROCEDURE — 85014 HEMATOCRIT: CPT

## 2019-06-19 PROCEDURE — 97530 THERAPEUTIC ACTIVITIES: CPT

## 2019-06-19 PROCEDURE — 83540 ASSAY OF IRON: CPT

## 2019-06-19 PROCEDURE — 6370000000 HC RX 637 (ALT 250 FOR IP): Performed by: INTERNAL MEDICINE

## 2019-06-19 PROCEDURE — 82746 ASSAY OF FOLIC ACID SERUM: CPT

## 2019-06-19 PROCEDURE — 94640 AIRWAY INHALATION TREATMENT: CPT

## 2019-06-19 PROCEDURE — 82607 VITAMIN B-12: CPT

## 2019-06-19 PROCEDURE — 1200000000 HC SEMI PRIVATE

## 2019-06-19 PROCEDURE — 84466 ASSAY OF TRANSFERRIN: CPT

## 2019-06-19 PROCEDURE — 97110 THERAPEUTIC EXERCISES: CPT

## 2019-06-19 PROCEDURE — 6370000000 HC RX 637 (ALT 250 FOR IP): Performed by: PHYSICIAN ASSISTANT

## 2019-06-19 PROCEDURE — 80048 BASIC METABOLIC PNL TOTAL CA: CPT

## 2019-06-19 PROCEDURE — 97116 GAIT TRAINING THERAPY: CPT

## 2019-06-19 PROCEDURE — 36415 COLL VENOUS BLD VENIPUNCTURE: CPT

## 2019-06-19 PROCEDURE — 94760 N-INVAS EAR/PLS OXIMETRY 1: CPT

## 2019-06-19 PROCEDURE — 85027 COMPLETE CBC AUTOMATED: CPT

## 2019-06-19 PROCEDURE — 85046 RETICYTE/HGB CONCENTRATE: CPT

## 2019-06-19 PROCEDURE — 85018 HEMOGLOBIN: CPT

## 2019-06-19 PROCEDURE — 84238 ASSAY NONENDOCRINE RECEPTOR: CPT

## 2019-06-19 PROCEDURE — 82728 ASSAY OF FERRITIN: CPT

## 2019-06-19 PROCEDURE — 93294 REM INTERROG EVL PM/LDLS PM: CPT | Performed by: INTERNAL MEDICINE

## 2019-06-19 RX ADMIN — HYDROCODONE BITARTRATE AND ACETAMINOPHEN 2 TABLET: 5; 325 TABLET ORAL at 18:30

## 2019-06-19 RX ADMIN — HYDROCODONE BITARTRATE AND ACETAMINOPHEN 2 TABLET: 5; 325 TABLET ORAL at 12:32

## 2019-06-19 RX ADMIN — PANTOPRAZOLE SODIUM 40 MG: 40 TABLET, DELAYED RELEASE ORAL at 20:49

## 2019-06-19 RX ADMIN — IPRATROPIUM BROMIDE AND ALBUTEROL SULFATE 1 AMPULE: .5; 3 SOLUTION RESPIRATORY (INHALATION) at 11:35

## 2019-06-19 RX ADMIN — DOCUSATE SODIUM 100 MG: 100 CAPSULE, LIQUID FILLED ORAL at 09:31

## 2019-06-19 RX ADMIN — DOCUSATE SODIUM 100 MG: 100 CAPSULE, LIQUID FILLED ORAL at 20:49

## 2019-06-19 RX ADMIN — IPRATROPIUM BROMIDE AND ALBUTEROL SULFATE 1 AMPULE: .5; 3 SOLUTION RESPIRATORY (INHALATION) at 15:35

## 2019-06-19 RX ADMIN — IPRATROPIUM BROMIDE AND ALBUTEROL SULFATE 1 AMPULE: .5; 3 SOLUTION RESPIRATORY (INHALATION) at 20:46

## 2019-06-19 RX ADMIN — HYDROCODONE BITARTRATE AND ACETAMINOPHEN 1 TABLET: 5; 325 TABLET ORAL at 06:16

## 2019-06-19 RX ADMIN — ROPINIROLE HYDROCHLORIDE 1 MG: 1 TABLET, FILM COATED ORAL at 20:49

## 2019-06-19 RX ADMIN — IPRATROPIUM BROMIDE AND ALBUTEROL SULFATE 1 AMPULE: .5; 3 SOLUTION RESPIRATORY (INHALATION) at 07:24

## 2019-06-19 RX ADMIN — METOPROLOL TARTRATE 50 MG: 50 TABLET, FILM COATED ORAL at 09:31

## 2019-06-19 RX ADMIN — APIXABAN 5 MG: 5 TABLET, FILM COATED ORAL at 09:32

## 2019-06-19 RX ADMIN — AMLODIPINE BESYLATE 5 MG: 5 TABLET ORAL at 09:32

## 2019-06-19 RX ADMIN — METOPROLOL TARTRATE 50 MG: 50 TABLET, FILM COATED ORAL at 20:49

## 2019-06-19 RX ADMIN — PANTOPRAZOLE SODIUM 40 MG: 40 TABLET, DELAYED RELEASE ORAL at 09:31

## 2019-06-19 RX ADMIN — MULTIPLE VITAMINS W/ MINERALS TAB 1 TABLET: TAB at 09:32

## 2019-06-19 RX ADMIN — ATORVASTATIN CALCIUM 40 MG: 40 TABLET, FILM COATED ORAL at 20:49

## 2019-06-19 RX ADMIN — APIXABAN 5 MG: 5 TABLET, FILM COATED ORAL at 20:49

## 2019-06-19 ASSESSMENT — PAIN SCALES - GENERAL
PAINLEVEL_OUTOF10: 7
PAINLEVEL_OUTOF10: 0
PAINLEVEL_OUTOF10: 0
PAINLEVEL_OUTOF10: 7
PAINLEVEL_OUTOF10: 6
PAINLEVEL_OUTOF10: 9
PAINLEVEL_OUTOF10: 0
PAINLEVEL_OUTOF10: 7

## 2019-06-19 ASSESSMENT — PAIN DESCRIPTION - ORIENTATION
ORIENTATION: RIGHT
ORIENTATION: RIGHT

## 2019-06-19 ASSESSMENT — PAIN DESCRIPTION - ONSET
ONSET: ON-GOING
ONSET: ON-GOING

## 2019-06-19 ASSESSMENT — PAIN DESCRIPTION - LOCATION
LOCATION: SHOULDER
LOCATION: ARM

## 2019-06-19 ASSESSMENT — PAIN DESCRIPTION - DESCRIPTORS
DESCRIPTORS: ACHING
DESCRIPTORS: SHARP

## 2019-06-19 ASSESSMENT — PAIN DESCRIPTION - PROGRESSION
CLINICAL_PROGRESSION: NOT CHANGED
CLINICAL_PROGRESSION: NOT CHANGED

## 2019-06-19 ASSESSMENT — PAIN - FUNCTIONAL ASSESSMENT: PAIN_FUNCTIONAL_ASSESSMENT: PREVENTS OR INTERFERES WITH MANY ACTIVE NOT PASSIVE ACTIVITIES

## 2019-06-19 ASSESSMENT — PAIN DESCRIPTION - FREQUENCY
FREQUENCY: CONTINUOUS
FREQUENCY: CONTINUOUS

## 2019-06-19 ASSESSMENT — PAIN DESCRIPTION - PAIN TYPE
TYPE: ACUTE PAIN
TYPE: ACUTE PAIN

## 2019-06-19 NOTE — PROGRESS NOTES
Physical Therapy   [facility]  INPATIENT PHYSICAL THERAPY  DAILYNOTE  [facility] ORTHOPEDICS 7K - 7K-02/002-A    Time In: 1672  Time Out: 1003  Timed Code Treatment Minutes: 29 Minutes  Minutes: 29          Date: 2019  Patient Name: Marlee Davison,  Gender:  female        MRN: [id number]  : 1935  (80 y.o.)     Referring Practitioner: Vivek POSADA-CNP  Diagnosis: Closed compression fx of body of L1 vertebrae  Additional Pertinent Hx: Pt admitted following a fall 2 days ago in which she fell onto a out stretched Right UE causing humerus and ulna fx and a L1 vert fx . Past Medical History:   Diagnosis Date    Anxiety     Arthritis     Blood circulation, collateral     Bronchiectasis (HCC)     CAD (coronary artery disease)     Chronic obstructive lung disease     Depression     Epigastric discomfort     Lower substernal and epigastric discomfort, etiology uncertain (probably not cardiac.)     Exertional dyspnea     Patient has considerable exertional dyspnea from her obstructive lung disease.  GERD (gastroesophageal reflux disease)     Hernia     History of blood transfusion     History of pneumonia     History of freqeunt episodes of pneumonia as a child.  Irregular heart beat     Joint pain     Pacemaker     S/P dual-chamber [employer] pacemaker implantation (symptomatic bradycardia probably related to AV block.)     Pneumonia     Restless legs syndrome     SSS (sick sinus syndrome) ([address]      Past Surgical History:   Procedure Laterality Date    APPENDECTOMY      BACK SURGERY      CARDIAC PACEMAKER PLACEMENT      CARDIAC PACEMAKER PLACEMENT  4 2010    Dual-chamber pulse generator removal. Attempted extraction of atrial and ventricular leads. Insertion of new ventricular lead. Insertion of new dual-chamber pulse generator (atrial lead capped.)     CARDIOVASCULAR STRESS TEST  8 2010    Gated study showed normal LV function. EF is 64%.  Iosotope scan in short conclusion of treatment session. Pain:  Yes. Pain Assessment  Pain Level: 7       Social/Functional:  Type of Home: House  Home Layout: Two level, Able to Live on Main level with bedroom/bathroom  Home Equipment: Wheelchair-manual, Rolling walker     Objective:  Rolling to Left: Stand by assistance  Supine to Sit: Stand by assistance  Sit to Supine: Stand by assistance    Transfers  Sit to Stand: Contact guard assistance  Stand to sit: Contact guard assistance  Comment: Patient completed functional transfers from [facility] and at the [facility]. Ambulation 1  Surface: level tile  Device: Hemiwalker  Other Apparatus: (RUE sling and TLSO)  Assistance: Contact guard assistance  Quality of Gait: decreased augusta and velocity, decreased B step length and heel strike, cues on hemiwalker placement with ambulation  Distance: 30'x1, 20'x1         Balance  Comments: Patient completed standing balance training with bathroom use to doff and melanie her depends. Patient required therapist assist to complete task. Patient completed standing balance training to increase strength, balance, and endurance. Exercises:  Exercises  Comments: Patient completed BLE supine therapeutic exercises 15x each with the performance of ankle pumps, quad sets, glute sets, heel slides, SAQ, hip abduction, and iso hip adduction. Patient required min cues on proper technique for max contraction to facilitate increased BLE strength and ROM required for increased improvement with functional mobility. Patient required occasional rest breaks with therapeutic exercises. Activity Tolerance:  Activity Tolerance: Patient Tolerated treatment well;Patient limited by fatigue    Assessment: Body structures, Functions, Activity limitations: Decreased functional mobility , Decreased strength, Decreased balance  Assessment: Patient tolerated treatment session fairly well. Patient required occasional rest breaks with therapy due to increased fatigue. Patient would benefit from continued therapy to increase strength, balance, endurance, and functional mobility. Prognosis: Good     REQUIRES PT FOLLOW UP: Yes    Discharge Recommendations:  Discharge Recommendations: [address], Patient would benefit from continued therapy after discharge    Patient Education:  Patient Education: POC, ther ex    Equipment Recommendations:  Equipment Needed: No(ECF can assess , she can use their hemiwalker)    Safety:  Type of devices: Gait belt, Call light within reach, Left in bed, Nurse notified, Bed alarm in place, All fall risk precautions in place  Restraints  Initially in place: No    Plan:  Times per week: 5 X O  Times per day: Daily  Current Treatment Recommendations: Strengthening, Functional Mobility Training, Balance Training, Transfer Training, Endurance Training, Gait Training    Goals:  Patient goals : [facility]    Short term goals  Time Frame for Short term goals: by discharge  Short term goal 1: supine to sit and return with SBA to get in and out of bed   Short term goal 2: sit to stand with SBA to get on and off various surfaces  Short term goal 3: Ambulate with hemiwalker 50 feet with SBA to walk household distances    Long term goals  Time Frame for Long term goals : NA due to short ELOS  If patient is discharged prior to progress note completion, this note is to serve as the discharge note with all goals being unmet unless indicated otherwise.

## 2019-06-19 NOTE — PROGRESS NOTES
Hospital Day #3   Pain well controlled. Flex and extends right elbow, bruising and swelling medially. No changes from an ortho standpoint. May come out of swathe,  May come out of sling for gentle elbow ROM  DC O2  Plan from tic.

## 2019-06-19 NOTE — PLAN OF CARE
Problem: Falls - Risk of:  Goal: Will remain free from falls  Description  Will remain free from falls  Outcome: Ongoing  Note:   Pt up with one assist. Patient compliant with using call light to call for assistance with ambulation. Problem: Falls - Risk of:  Goal: Absence of physical injury  Description  Absence of physical injury  Outcome: Ongoing  Note:   Pt free of physical injury. Problem: Pain:  Goal: Pain level will decrease  Description  Pain level will decrease  Outcome: Ongoing  Note:   Pt report pain at 8 on scale. Pt states oral/iv/im medication helping to achieve pain goal of a 5 on scale. Problem: Pain:  Goal: Control of acute pain  Description  Control of acute pain  Outcome: Ongoing  Note:   Pt voices pain medication helps reduce her pain. Ice, sling, reposition used for pain relief. Problem: SAFETY  Goal: Free from accidental physical injury  Outcome: Ongoing  Note:   Pt free of physical injury. Problem: DAILY CARE  Goal: Daily care needs are met  Outcome: Ongoing  Note:   Pt able to complete ADL's with moderate assist.     Problem: SKIN INTEGRITY  Goal: Skin integrity is maintained or improved  Outcome: Ongoing  Note:   Pt has bruising on right arm. No other skin breakdown noted. Problem: KNOWLEDGE DEFICIT  Goal: Patient/S.O. demonstrates understanding of disease process, treatment plan, medications, and discharge instructions. Outcome: Ongoing  Note:   Pt verbalizes understanding of treatment plan. Problem: DISCHARGE BARRIERS  Goal: Patient's continuum of care needs are met  Outcome: Ongoing  Note:   Discharge planning in progress. Patient determining ECF to go to. Care plan reviewed with patient. Patient verbalize understanding of the plan of care and contribute to goal setting.

## 2019-06-19 NOTE — PROGRESS NOTES
[address] [facility]  Occupational Therapy  Daily Note  Time:   Time In: 08  Time Out: 09  Timed Code Treatment Minutes: 17 Minutes  Minutes: 17          Date: 2019  Patient Name: Severo Minion,   Gender: female      Room: [facility]002-A  MRN: [id number]  : 1935  (80 y.o.)  Referring Practitioner: ALBINO Oocnnor CNP  Diagnosis: Closed compression fracture of body of L1 vertebra  Additional Pertinent Hx: Pt admitted following a fall 2 days ago in which she fell onto a out stretched Right UE causing humerus and ulna fx and a L1 vert fx     Restrictions/Precautions:  Restrictions/Precautions: Weight Bearing, General Precautions, Fall Risk  Right Upper Extremity Weight Bearing: Non Weight Bearing  Right Upper Extremity Brace/Splint: sling and swath for fx humerus and ulna and wrist splint  Required Braces or Orthoses  Spinal: Thoracic Lumbar Sacral Orthotics  Spinal Other: on when OOB  Right Upper Extremity Brace/Splint: Sling  Right Upper Extremity Brace/Splint: sling and swath for fx humerus and ulna and wrist splint  Position Activity Restriction  Other position/activity restrictions: TLSO on when OOB , NWB right UE    SUBJECTIVE: Patient supine in bed upon arrival.     PAIN: NO Number given    COGNITION: WNL    ADL:   Grooming: Stand By Assistance and with set-up. washings hands and face and hair care. UB dressing: adjustment of sling strap to hold arm against body  LB dressing: Max Adonning B LE socks    BALANCE:  Sitting Balance:  Stand By Assistance EOB static with education on sling sand TLSO. Patient asking questions and therapist answering or referring patients to RN staff for answers to questions. BED MOBILITY:  Supine to Sit: Contact Guard Assistance    Sit to Supine: Contact Guard Assistance     Head of bed elevated slow pace additional time required.      Therapist conferring with RN staff and OTR for clarification on Donning TLSO with Sling and doctor protocol on the combination of sling and TLSO with shoulder fx. ASSESSMENT:  Activity Tolerance:  Patient tolerance of  treatment: good. Discharge Recommendations: Subacute/Skilled Nursing Facility  Equipment Recommendations: Equipment Needed: No  Other: defer  Plan: Times per week: 5x  Current Treatment Recommendations: Strengthening, Functional Mobility Training, Balance Training, Endurance Training, Home Management Training, Self-Care / ADL, Patient/Caregiver Education & Training, Safety Education & Training, Pain Management    Patient Education  Patient Education: Adaptive ADL Techniques, Precautions and TLSO and sling    Goals  Short term goals  Time Frame for Short term goals: 2 weeks  Short term goal 1: Pt to complete functional mobility to/from  and various ADL transfers at [facility] with 0 cues for safety for inc indep with toileting  Short term goal 2: Pt to demonstrate standing tolerance greater than 3 mins with no UE support at consistent CGA for inc indep with grooming tasks  Short term goal 3: Pt to demonstrate adaptive dressing techniques completing UB dressing at mod A  Long term goals  Time Frame for Long term goals : NA d/t ELOS    Following session, patient left in safe position with all fall risk precautions in place.

## 2019-06-19 NOTE — PLAN OF CARE
Problem: Falls - Risk of:  Goal: Will remain free from falls  Description  Will remain free from falls  6/19/2019 0352 by Carroll Mandel RN  Outcome: Ongoing  Note:   No falls noted this shift. Patient ambulates with x1 staff assistance without difficulty. Bed kept in low position. Safe environment maintained. Wheels locked. Bedside table & call light in reach. Uses call light appropriately when needing assistance. Problem: Falls - Risk of:  Goal: Absence of physical injury  Description  Absence of physical injury  6/19/2019 0352 by Carroll Mandel RN  Outcome: Ongoing  Note:   No falls noted this shift. Patient ambulates with x1 staff assistance without difficulty. Bed kept in low position. Safe environment maintained. Wheels locked. Bedside table & call light in reach. Uses call light appropriately when needing assistance. Problem: Pain:  Goal: Pain level will decrease  Description  Pain level will decrease  6/19/2019 0352 by Carroll Mandel RN  Outcome: Ongoing  Note:   Pt denies pain on scale. Pt  pain goal of a 5 on scale. Problem: Pain:  Goal: Control of acute pain  Description  Control of acute pain  6/19/2019 0352 by Carroll Mandel RN  Outcome: Ongoing  Note:   Pt denies pain on scale. Pt  pain goal of a 5 on scale. Problem: SAFETY  Goal: Free from accidental physical injury  6/19/2019 0352 by Carroll Mandel RN  Outcome: Ongoing  Note:   No falls noted this shift. Patient ambulates with x1 staff assistance without difficulty. Bed kept in low position. Safe environment maintained. Wheels locked. Bedside table & call light in reach. Uses call light appropriately when needing assistance. Problem: DAILY CARE  Goal: Daily care needs are met  6/19/2019 0352 by Carroll Mandel RN  Outcome: Ongoing  Note:   Pt encouraged to provide self care as able. Continue to monitor needs.        Problem: SKIN INTEGRITY  Goal: Skin integrity is maintained or improved  6/19/2019 0352 by Fahad Goode

## 2019-06-19 NOTE — FLOWSHEET NOTE
19 3256   Encounter Summary   Services provided to: Patient   Referral/Consult From: [facility] Family members   Place of Shinto none   Continue Visiting Yes  (19 continue support)   Complexity of Encounter Moderate   Length of Encounter 15 minutes   Spiritual Assessment Completed Yes   Routine   Type Initial   Assessment Approachable;Calm; Hopeful   Intervention Active listening;Nurtured hope;Prayer;Discussed illness/injury and it's impact   Outcome Expressed gratitude;Engaged in conversation   Spiritual/Gnosticism   Type Spiritual support     Patient engaged in conversation with this  during  visit. Patient stated, \"I fell in my garage after tripping over my cord. I have just mowed my yard. \" Patient also told this  that her late  has been  for many years. Patient has two brothers alive. Patient appreciate her neighbors who have been providing extra support and assistant to her at home. Patient has no [employer] home at the moment.  provided active listening and offered emotional support.  will follow up for continue support.

## 2019-06-19 NOTE — CARE COORDINATION
6/19/19, 11:21 AM    DISCHARGE BARRIERS      Spoke with Namita Abbott about TCU bed available tomorrow. This is her first choice, and she is happy she can go to [facility].

## 2019-06-19 NOTE — CARE COORDINATION
6/19/19, 10:27 AM      Sheng Bonds day: 3  Location: [facility]02/002-A Reason for admit: Closed compression fracture of body of L1 vertebra ([facility] [address] [S32.010A]   Procedure: no - conservative treatment  L1 compression fx, right humerus- distal fx of proximal end, right ulna fx  Treatment Plan of Care: Non- operative management. N/V checks. Pain management. TLSO Back brace. PT/OT  with brace on. NWB right arm. Wrist splint. Velcro wrist splint. PCP: Clarice Yan MD  Readmission Risk Score: 11%  Discharge Plan: [facility] said she will have bed tomorrow.

## 2019-06-20 ENCOUNTER — HOSPITAL ENCOUNTER (INPATIENT)
Age: 84
LOS: 13 days | Discharge: HOME | DRG: 561 | End: 2019-07-03
Attending: INTERNAL MEDICINE | Admitting: INTERNAL MEDICINE
Payer: MEDICARE

## 2019-06-20 VITALS
OXYGEN SATURATION: 93 % | HEART RATE: 99 BPM | TEMPERATURE: 97.9 F | SYSTOLIC BLOOD PRESSURE: 113 MMHG | HEIGHT: 63 IN | DIASTOLIC BLOOD PRESSURE: 47 MMHG | WEIGHT: 138.31 LBS | RESPIRATION RATE: 16 BRPM | BODY MASS INDEX: 24.51 KG/M2

## 2019-06-20 DIAGNOSIS — S32.010A CLOSED COMPRESSION FRACTURE OF BODY OF L1 VERTEBRA (HCC): Primary | ICD-10-CM

## 2019-06-20 DIAGNOSIS — S42.291D OTHER CLOSED DISPLACED FRACTURE OF PROXIMAL END OF RIGHT HUMERUS WITH ROUTINE HEALING, SUBSEQUENT ENCOUNTER: ICD-10-CM

## 2019-06-20 PROCEDURE — 97110 THERAPEUTIC EXERCISES: CPT

## 2019-06-20 PROCEDURE — 6370000000 HC RX 637 (ALT 250 FOR IP): Performed by: ORTHOPAEDIC SURGERY

## 2019-06-20 PROCEDURE — 6370000000 HC RX 637 (ALT 250 FOR IP): Performed by: INTERNAL MEDICINE

## 2019-06-20 PROCEDURE — 94640 AIRWAY INHALATION TREATMENT: CPT

## 2019-06-20 PROCEDURE — 6370000000 HC RX 637 (ALT 250 FOR IP): Performed by: PHYSICIAN ASSISTANT

## 2019-06-20 PROCEDURE — 1290000000 HC SEMI PRIVATE OTHER R&B

## 2019-06-20 PROCEDURE — 97116 GAIT TRAINING THERAPY: CPT

## 2019-06-20 PROCEDURE — 0220000000 HC SKILLED NURSING FACILITY

## 2019-06-20 RX ORDER — ONDANSETRON 2 MG/ML
4 INJECTION INTRAMUSCULAR; INTRAVENOUS EVERY 6 HOURS PRN
Status: CANCELLED | OUTPATIENT
Start: 2019-06-20

## 2019-06-20 RX ORDER — NITROGLYCERIN 0.4 MG/1
0.4 TABLET SUBLINGUAL EVERY 5 MIN PRN
Status: CANCELLED | OUTPATIENT
Start: 2019-06-20

## 2019-06-20 RX ORDER — POLYETHYLENE GLYCOL 3350 17 G/17G
17 POWDER, FOR SOLUTION ORAL DAILY
Status: DISCONTINUED | OUTPATIENT
Start: 2019-06-20 | End: 2019-06-20 | Stop reason: HOSPADM

## 2019-06-20 RX ORDER — ALBUTEROL SULFATE 90 UG/1
2 AEROSOL, METERED RESPIRATORY (INHALATION) EVERY 6 HOURS PRN
Status: CANCELLED | OUTPATIENT
Start: 2019-06-20

## 2019-06-20 RX ORDER — FERROUS SULFATE 325(65) MG
325 TABLET ORAL 2 TIMES DAILY WITH MEALS
Status: DISCONTINUED | OUTPATIENT
Start: 2019-06-20 | End: 2019-07-03 | Stop reason: HOSPADM

## 2019-06-20 RX ORDER — AMLODIPINE BESYLATE 5 MG/1
5 TABLET ORAL DAILY
Status: DISCONTINUED | OUTPATIENT
Start: 2019-06-21 | End: 2019-07-03 | Stop reason: HOSPADM

## 2019-06-20 RX ORDER — IPRATROPIUM BROMIDE AND ALBUTEROL SULFATE 2.5; .5 MG/3ML; MG/3ML
1 SOLUTION RESPIRATORY (INHALATION)
Status: CANCELLED | OUTPATIENT
Start: 2019-06-20

## 2019-06-20 RX ORDER — DOCUSATE SODIUM 100 MG/1
100 CAPSULE, LIQUID FILLED ORAL 2 TIMES DAILY
Status: DISCONTINUED | OUTPATIENT
Start: 2019-06-20 | End: 2019-07-03 | Stop reason: HOSPADM

## 2019-06-20 RX ORDER — BISACODYL 10 MG
10 SUPPOSITORY, RECTAL RECTAL DAILY PRN
Status: DISCONTINUED | OUTPATIENT
Start: 2019-06-20 | End: 2019-06-20 | Stop reason: HOSPADM

## 2019-06-20 RX ORDER — POLYETHYLENE GLYCOL 3350 17 G/17G
17 POWDER, FOR SOLUTION ORAL DAILY
Status: CANCELLED | OUTPATIENT
Start: 2019-06-21

## 2019-06-20 RX ORDER — M-VIT,TX,IRON,MINS/CALC/FOLIC 27MG-0.4MG
1 TABLET ORAL DAILY
Status: DISCONTINUED | OUTPATIENT
Start: 2019-06-21 | End: 2019-07-03 | Stop reason: HOSPADM

## 2019-06-20 RX ORDER — ROPINIROLE 1 MG/1
1 TABLET, FILM COATED ORAL NIGHTLY
Status: DISCONTINUED | OUTPATIENT
Start: 2019-06-20 | End: 2019-07-03 | Stop reason: HOSPADM

## 2019-06-20 RX ORDER — PANTOPRAZOLE SODIUM 40 MG/1
40 TABLET, DELAYED RELEASE ORAL 2 TIMES DAILY
Status: CANCELLED | OUTPATIENT
Start: 2019-06-20

## 2019-06-20 RX ORDER — ONDANSETRON 2 MG/ML
4 INJECTION INTRAMUSCULAR; INTRAVENOUS EVERY 6 HOURS PRN
Status: DISCONTINUED | OUTPATIENT
Start: 2019-06-20 | End: 2019-07-03 | Stop reason: HOSPADM

## 2019-06-20 RX ORDER — HYDROCODONE BITARTRATE AND ACETAMINOPHEN 5; 325 MG/1; MG/1
1 TABLET ORAL EVERY 6 HOURS PRN
Status: DISCONTINUED | OUTPATIENT
Start: 2019-06-20 | End: 2019-07-03 | Stop reason: HOSPADM

## 2019-06-20 RX ORDER — HYDROCODONE BITARTRATE AND ACETAMINOPHEN 5; 325 MG/1; MG/1
2 TABLET ORAL EVERY 6 HOURS PRN
Status: DISCONTINUED | OUTPATIENT
Start: 2019-06-20 | End: 2019-07-03 | Stop reason: HOSPADM

## 2019-06-20 RX ORDER — AMLODIPINE BESYLATE 5 MG/1
5 TABLET ORAL DAILY
Status: CANCELLED | OUTPATIENT
Start: 2019-06-21

## 2019-06-20 RX ORDER — HYDROCODONE BITARTRATE AND ACETAMINOPHEN 5; 325 MG/1; MG/1
2 TABLET ORAL EVERY 6 HOURS PRN
Status: CANCELLED | OUTPATIENT
Start: 2019-06-20

## 2019-06-20 RX ORDER — DOCUSATE SODIUM 100 MG/1
100 CAPSULE, LIQUID FILLED ORAL 2 TIMES DAILY
Status: CANCELLED | OUTPATIENT
Start: 2019-06-20

## 2019-06-20 RX ORDER — NITROGLYCERIN 0.4 MG/1
0.4 TABLET SUBLINGUAL EVERY 5 MIN PRN
Status: DISCONTINUED | OUTPATIENT
Start: 2019-06-20 | End: 2019-07-03 | Stop reason: HOSPADM

## 2019-06-20 RX ORDER — IPRATROPIUM BROMIDE AND ALBUTEROL SULFATE 2.5; .5 MG/3ML; MG/3ML
1 SOLUTION RESPIRATORY (INHALATION)
Status: DISCONTINUED | OUTPATIENT
Start: 2019-06-20 | End: 2019-06-29

## 2019-06-20 RX ORDER — PANTOPRAZOLE SODIUM 40 MG/1
40 TABLET, DELAYED RELEASE ORAL 2 TIMES DAILY
Status: DISCONTINUED | OUTPATIENT
Start: 2019-06-20 | End: 2019-07-03 | Stop reason: HOSPADM

## 2019-06-20 RX ORDER — METOPROLOL TARTRATE 50 MG/1
50 TABLET, FILM COATED ORAL 2 TIMES DAILY
Status: DISCONTINUED | OUTPATIENT
Start: 2019-06-20 | End: 2019-07-03 | Stop reason: HOSPADM

## 2019-06-20 RX ORDER — ATORVASTATIN CALCIUM 40 MG/1
40 TABLET, FILM COATED ORAL NIGHTLY
Status: DISCONTINUED | OUTPATIENT
Start: 2019-06-20 | End: 2019-07-03 | Stop reason: HOSPADM

## 2019-06-20 RX ORDER — ALBUTEROL SULFATE 90 UG/1
2 AEROSOL, METERED RESPIRATORY (INHALATION) EVERY 6 HOURS PRN
Status: DISCONTINUED | OUTPATIENT
Start: 2019-06-20 | End: 2019-07-03 | Stop reason: HOSPADM

## 2019-06-20 RX ORDER — M-VIT,TX,IRON,MINS/CALC/FOLIC 27MG-0.4MG
1 TABLET ORAL DAILY
Status: CANCELLED | OUTPATIENT
Start: 2019-06-21

## 2019-06-20 RX ORDER — POLYETHYLENE GLYCOL 3350 17 G/17G
17 POWDER, FOR SOLUTION ORAL DAILY
Status: DISCONTINUED | OUTPATIENT
Start: 2019-06-21 | End: 2019-07-03 | Stop reason: HOSPADM

## 2019-06-20 RX ORDER — ATORVASTATIN CALCIUM 40 MG/1
40 TABLET, FILM COATED ORAL NIGHTLY
Status: CANCELLED | OUTPATIENT
Start: 2019-06-20

## 2019-06-20 RX ORDER — HYDROCODONE BITARTRATE AND ACETAMINOPHEN 5; 325 MG/1; MG/1
1 TABLET ORAL EVERY 6 HOURS PRN
Status: CANCELLED | OUTPATIENT
Start: 2019-06-20

## 2019-06-20 RX ORDER — METOPROLOL TARTRATE 50 MG/1
50 TABLET, FILM COATED ORAL 2 TIMES DAILY
Status: CANCELLED | OUTPATIENT
Start: 2019-06-20

## 2019-06-20 RX ORDER — ROPINIROLE 1 MG/1
1 TABLET, FILM COATED ORAL NIGHTLY
Status: CANCELLED | OUTPATIENT
Start: 2019-06-20

## 2019-06-20 RX ADMIN — DOCUSATE SODIUM 100 MG: 100 CAPSULE, LIQUID FILLED ORAL at 23:53

## 2019-06-20 RX ADMIN — POLYETHYLENE GLYCOL 3350 17 G: 17 POWDER, FOR SOLUTION ORAL at 08:51

## 2019-06-20 RX ADMIN — METOPROLOL TARTRATE 50 MG: 50 TABLET, FILM COATED ORAL at 08:51

## 2019-06-20 RX ADMIN — PANTOPRAZOLE SODIUM 40 MG: 40 TABLET, DELAYED RELEASE ORAL at 23:57

## 2019-06-20 RX ADMIN — HYDROCODONE BITARTRATE AND ACETAMINOPHEN 2 TABLET: 5; 325 TABLET ORAL at 06:35

## 2019-06-20 RX ADMIN — BISACODYL 10 MG: 10 SUPPOSITORY RECTAL at 13:39

## 2019-06-20 RX ADMIN — IPRATROPIUM BROMIDE AND ALBUTEROL SULFATE 1 AMPULE: .5; 3 SOLUTION RESPIRATORY (INHALATION) at 12:01

## 2019-06-20 RX ADMIN — DOCUSATE SODIUM 100 MG: 100 CAPSULE, LIQUID FILLED ORAL at 08:51

## 2019-06-20 RX ADMIN — IPRATROPIUM BROMIDE AND ALBUTEROL SULFATE 1 AMPULE: .5; 3 SOLUTION RESPIRATORY (INHALATION) at 07:51

## 2019-06-20 RX ADMIN — APIXABAN 5 MG: 5 TABLET, FILM COATED ORAL at 23:40

## 2019-06-20 RX ADMIN — HYDROCODONE BITARTRATE AND ACETAMINOPHEN 2 TABLET: 5; 325 TABLET ORAL at 16:10

## 2019-06-20 RX ADMIN — APIXABAN 5 MG: 5 TABLET, FILM COATED ORAL at 08:51

## 2019-06-20 RX ADMIN — HYDROCODONE BITARTRATE AND ACETAMINOPHEN 2 TABLET: 5; 325 TABLET ORAL at 00:31

## 2019-06-20 RX ADMIN — IPRATROPIUM BROMIDE AND ALBUTEROL SULFATE 1 AMPULE: .5; 3 SOLUTION RESPIRATORY (INHALATION) at 17:23

## 2019-06-20 RX ADMIN — HYDROCODONE BITARTRATE AND ACETAMINOPHEN 2 TABLET: 5; 325 TABLET ORAL at 23:42

## 2019-06-20 RX ADMIN — ROPINIROLE HYDROCHLORIDE 1 MG: 1 TABLET, FILM COATED ORAL at 23:40

## 2019-06-20 RX ADMIN — MULTIPLE VITAMINS W/ MINERALS TAB 1 TABLET: TAB at 08:51

## 2019-06-20 RX ADMIN — PANTOPRAZOLE SODIUM 40 MG: 40 TABLET, DELAYED RELEASE ORAL at 08:51

## 2019-06-20 RX ADMIN — METOPROLOL TARTRATE 50 MG: 50 TABLET, FILM COATED ORAL at 23:40

## 2019-06-20 RX ADMIN — ATORVASTATIN CALCIUM 40 MG: 40 TABLET, FILM COATED ORAL at 23:40

## 2019-06-20 ASSESSMENT — PAIN DESCRIPTION - PAIN TYPE
TYPE: ACUTE PAIN

## 2019-06-20 ASSESSMENT — PAIN DESCRIPTION - PROGRESSION
CLINICAL_PROGRESSION: GRADUALLY WORSENING

## 2019-06-20 ASSESSMENT — PAIN SCALES - GENERAL
PAINLEVEL_OUTOF10: 8
PAINLEVEL_OUTOF10: 7
PAINLEVEL_OUTOF10: 7
PAINLEVEL_OUTOF10: 4
PAINLEVEL_OUTOF10: 6
PAINLEVEL_OUTOF10: 0
PAINLEVEL_OUTOF10: 8
PAINLEVEL_OUTOF10: 0
PAINLEVEL_OUTOF10: 7
PAINLEVEL_OUTOF10: 7

## 2019-06-20 ASSESSMENT — PAIN DESCRIPTION - ONSET
ONSET: ON-GOING
ONSET: ON-GOING

## 2019-06-20 ASSESSMENT — PAIN DESCRIPTION - ORIENTATION
ORIENTATION: RIGHT

## 2019-06-20 ASSESSMENT — PAIN DESCRIPTION - FREQUENCY
FREQUENCY: INTERMITTENT
FREQUENCY: INTERMITTENT

## 2019-06-20 ASSESSMENT — PAIN DESCRIPTION - LOCATION
LOCATION: SHOULDER
LOCATION: SHOULDER
LOCATION: SHOULDER;ARM

## 2019-06-20 ASSESSMENT — PAIN DESCRIPTION - DESCRIPTORS
DESCRIPTORS: SHARP
DESCRIPTORS: ACHING;DISCOMFORT

## 2019-06-20 NOTE — PLAN OF CARE
Problem: Impaired respiratory status  Goal: Clear lung sounds  6/20/2019 0755 by Ar Villasenor RCP  Outcome: Ongoing  Improve breath sounds, Pt takes breathing meds at home.

## 2019-06-20 NOTE — PROGRESS NOTES
Physical Therapy   [facility]  INPATIENT PHYSICAL THERAPY  DAILY NOTE  [facility] ORTHOPEDICS 7K - 7K-02/002-A  Time In: 3728  Time Out: 1130  Timed Code Treatment Minutes: 25 Minutes  Minutes: 25          Date: 2019  Patient Name: Ruthie Centeno,  Gender:  female        MRN: [id number]  : 1935  (80 y.o.)     Referring Practitioner: Alison Winter APRN-CNP  Diagnosis: Closed compression fx of body of L1 vertebrae  Additional Pertinent Hx: Pt admitted following a fall 2 days ago in which she fell onto a out stretched Right UE causing humerus and ulna fx and a L1 vert fx . Prior Level of Function:  Lives With: Alone  Type of Home: House  Home Layout: Two level, Able to Live on Main level with bedroom/bathroom  Home Equipment: Wheelchair-manual, Rolling walker    Restrictions/Precautions:  Restrictions/Precautions: Weight Bearing, General Precautions, Fall Risk  Right Upper Extremity Weight Bearing: Non Weight Bearing  Right Upper Extremity Brace/Splint: sling and swath for fx humerus and ulna and wrist splint  Required Braces or Orthoses  Spinal: Thoracic Lumbar Sacral Orthotics  Spinal Other: on when OOB  Right Upper Extremity Brace/Splint: Sling  Right Upper Extremity Brace/Splint: sling and swath for fx humerus and ulna and wrist splint  Position Activity Restriction  Other position/activity restrictions: TLSO on when OOB , NWB right UE    SUBJECTIVE: RN approved treatment session. Patient in bed upon arrival and agreeable to therapy. Patient pleasant and cooperative during treatment session. RN assisted with donning patient's TLSO prior to ambulation. PAIN: 0/10:     OBJECTIVE:  Bed Mobility:  Rolling to Left: Stand By Assistance, With use of bed rail.    Supine to Sit: Stand By Assistance    Transfers:  Sit to Stand: Contact Guard Assistance  Stand to [employer] Assistance    Ambulation:  Contact Guard Assistance  Distance: 30'x1  Surface: Level Tile  Device:Kvng-Walker, Patient had RUE sling and TLSO donned with ambulation. Patient also was on 1.5 liters O2. Gait Deviations:  Slow Kinsey, Decreased Step Length Bilaterally and Decreased Heel Strike Bilaterally  EXERCISE:  Patient was guided in 1 set(s) 15 reps of exercise to both lower extremities. Glut sets, Hip abduction/adduction, Seated hip flexion, Long arc quads, Seated hamstring curls and heel/toe raises. Exercises were completed for increased independence with functional mobility. ASSESSMENT: Patient required occasional rest breaks with therapeutic exercises. Patient completed therapy on 1.5 liters O2. Patient would benefit from continued therapy to increase strength, balance, endurance, and functional mobility. Activity Tolerance:  Patient tolerance of  treatment: good. Equipment Recommendations:Equipment Needed: No(ECF can assess , she can use their hemiwalker)  Discharge Recommendations:  Discharge Recommendations: [address], Patient would benefit from continued therapy after discharge    Plan: Times per week: 5 X O  Times per day: Daily  Current Treatment Recommendations: Strengthening, Functional Mobility Training, Balance Training, Transfer Training, Endurance Training, Gait Training    Patient Education  Patient Education: [employer], Transfers, Gait, Use of Gait Belt,  - Patient Verbalized Understanding    Goals:  Patient goals : Go to [facility] for rehab  Short term goals  Time Frame for Short term goals: by discharge  Short term goal 1: supine to sit and return with SBA to get in and out of bed   Short term goal 2: sit to stand with SBA to get on and off various surfaces  Short term goal 3: Ambulate with hemiwalker 50 feet with SBA to walk household distances  Long term goals  Time Frame for Long term goals : NA due to short ELOS    Following session, patient left in safe position with all fall risk precautions in place.

## 2019-06-20 NOTE — PROGRESS NOTES
hgb trending up 7.3  No changes from an ortho standpoint. Okay to transfer to TCU when okay with other services.   Electronically signed by ALBINO Powers CNP on 6/20/2019 at 8:43 AM

## 2019-06-20 NOTE — CARE COORDINATION
6/20/19, 10:47 AM    Discharge plan discussed by  and . Discharge plan reviewed with patient/ family. Patient/ family verbalize understanding of discharge plan and are in agreement with plan. Understanding was demonstrated using the teach back method.    Services After Discharge  Services At/After Discharge: Nursing Services, OT, PT, Skilled Therapy   IMM Letter  IMM Letter given to Patient/Family/Significant other/Guardian/POA/by[de-identified] cm  IMM Letter date given[de-identified] 06/20/19  IMM Letter time given[de-identified] 6918     Planning discharge/readmit to [facility]

## 2019-06-20 NOTE — PROGRESS NOTES
Patient admitted to [phone] from [facility], no family at bedside. Oriented to call light, room, and staff. Bed functions explained and demonstrated for patient. Patient requesting to have 2 siderails at the head of bed in the upright position to facilitate use of bed functions. Admission packet and patient rights provided, questions answered. No needs expressed at this time. Admitting medication orders compared with acute stay medications; home medication list reviewed with patient/family. Medication issues identified No    Two person skin check completed by this nurse and Micheal Kowalski LPN. Scattered, fading bruising present.

## 2019-06-20 NOTE — PROGRESS NOTES
Patient admitted to 8e65 from 7, . Oriented to call light, room, and staff. Bed functions explained and demonstrated for patient. Patient requesting to have 2 siderails at the head of bed in the upright position to facilitate use of bed functions. Admission packet and patient rights provided, questions answered. No needs expressed at this time. Admitting medication orders compared with acute stay medications; home medication list reviewed with patient/family.   Medication issues identified no  Medication issue: none

## 2019-06-20 NOTE — PLAN OF CARE
Problem: Falls - Risk of:  Goal: Will remain free from falls  Description  Will remain free from falls  6/19/2019 2216 by Lashawn Shultz RN  Outcome: Ongoing  Note:   No falls noted this shift. Patient ambulates with x1 staff assistance without difficulty. Bed kept in low position. Safe environment maintained. Wheels locked. Bedside table & call light in reach. Uses call light appropriately when needing assistance. Problem: Falls - Risk of:  Goal: Absence of physical injury  Description  Absence of physical injury  Outcome: Ongoing  Note:   No falls noted this shift. Patient ambulates with x1 staff assistance without difficulty. Bed kept in low position. Safe environment maintained. Wheels locked. Bedside table & call light in reach. Uses call light appropriately when needing assistance. Problem: Pain:  Goal: Pain level will decrease  Description  Pain level will decrease  6/19/2019 2216 by Lashawn Shultz RN  Outcome: Ongoing  Note:   Pt denies pain on scale. Pt states pain goal of a 4 on scale. Problem: Pain:  Goal: Control of acute pain  Description  Control of acute pain  6/19/2019 2216 by Lashawn Shultz RN  Outcome: Ongoing  Note:   Pt denies pain on scale. Pt states pain goal of a 4 on scale. Problem: SAFETY  Goal: Free from accidental physical injury  6/19/2019 2216 by Lashawn Shultz RN  Outcome: Ongoing  Note:   No falls noted this shift. Patient ambulates with x1 staff assistance without difficulty. Bed kept in low position. Safe environment maintained. Wheels locked. Bedside table & call light in reach. Uses call light appropriately when needing assistance. Problem: DAILY CARE  Goal: Daily care needs are met  6/19/2019 2216 by Lashawn Shultz RN  Outcome: Ongoing  Note:   Pt encouraged to provide self care as able. Continue to monitor needs.        Problem: SKIN INTEGRITY  Goal: Skin integrity is maintained or improved  6/19/2019 2216 by Lashawn Shultz RN  Outcome: Ongoing  Note:   Pt able to make position changes per self, and staff assist when needed. Encouraged to make frequent changes in position to prevent skin breakdown. Problem: KNOWLEDGE DEFICIT  Goal: Patient/S.O. demonstrates understanding of disease process, treatment plan, medications, and discharge instructions. 6/19/2019 2216 by Juliana Sheehan RN  Outcome: Ongoing  Note:   Pt states understanding of disease process, treatment plan, medications, and discharge instruction when mentioned to Pt. Problem: DISCHARGE BARRIERS  Goal: Patient's continuum of care needs are met  6/19/2019 2216 by Juliana Sheehan RN  Outcome: Ongoing  Note:   Pt plans TCU at discharge. Care manager and social working helping with discharge needs. Care plan reviewed with patient. Patient verbalize understanding of the plan of care and contribute to goal setting.

## 2019-06-20 NOTE — PLAN OF CARE
Problem: Impaired respiratory status  Goal: Clear lung sounds  6/19/2019 2051 by Wong Carlisle RCP  Outcome: Ongoing  Note:   Clear breath sounds not noted. Patient has loose but nonproductive cough. Will continue with breathing treatments to meet goal of clear breath sounds.

## 2019-06-20 NOTE — DISCHARGE INSTR - DIET

## 2019-06-20 NOTE — H&P
total doses. If no relief after 1 dose, call 911. 5/13/18   Son Liriano PA-C   ciprofloxacin (CIPRO) 750 MG tablet Take 750 mg by mouth 2 times daily On 2 weeks, then off 1 week, repeat (for chronic lung infections)    Historical Provider, MD   albuterol-ipratropium (COMBIVENT RESPIMAT)  MCG/ACT AERS inhaler Inhale 1 puff into the lungs every 6 hours as needed 12/6/17   ALBINO Hair - CNP   ropinirole (REQUIP) 0.25 MG tablet Take 1 mg by mouth nightly. Historical Provider, MD   Multiple Vitamins-Minerals (PRESERVISION AREDS PO) Take  by mouth daily. Historical Provider, MD   furosemide (LASIX) 40 MG tablet Take 40 mg by mouth daily as needed (taking PRN)     Historical Provider, MD       Allergies:  Adhesive tape; Eggs or egg-derived products; and Quinine derivatives    Social History:   TOBACCO:   reports that she has never smoked. She has never used smokeless tobacco.  ETOH:   reports that she drinks alcohol.       Family History:       Problem Relation Age of Onset   Rafael Jimenez Cancer Mother         lung-non smoker    Other Father         \"strangled on an ulcer that burst\"   Rafael Jimenez Cancer Sister         throat    Cancer Sister         melanoma       REVIEW OF SYSTEMS:  CONSTITUTIONAL:  positive for  fatigue and malaise  negative for  fevers and chills  RESPIRATORY:  positive for  cough with sputum, dyspnea and wheezing  negative for  hemoptysis and chest pain  CARDIOVASCULAR:  negative for  orthopnea, PND, edema  GASTROINTESTINAL:  negative for nausea, vomiting, change in bowel habits, abdominal pain, abdominal mass and abdominal distention  GENITOURINARY:  negative for frequency and dysuria  ENDOCRINE:  negative for heat intolerance, cold intolerance and change in bowel habits  MUSCULOSKELETAL:  positive for  myalgias, arthralgias and bone pain  negative for  muscle weakness  NEUROLOGICAL:  negative for headaches, dizziness and seizures  BEHAVIOR/PSYCH:  negative for increased agitation and anxiety    Physical Exam:    Vitals: BP (!) 119/57   Pulse 95   Temp 98.9 °F (37.2 °C) (Oral)   Resp 14   SpO2 95%   CONSTITUTIONAL:  fatigued, alert, cooperative and no apparent distress  EYES:  extra-ocular muscles intact  ENT:  normocepalic, without obvious abnormality  NECK:  supple, symmetrical, trachea midline  LUNGS:  crackles right base and left base, diminished breath sounds throughout lungs  CARDIOVASCULAR:  normal S1 and S2  ABDOMEN:  normal bowel sounds, soft, non-distended, non-tender and no hepatosplenomegally  MUSCULOSKELETAL:  there is sling to rt arm, brace to torso  NEUROLOGIC:  Mental Status Exam:  Level of Alertness:   awake  Orientation:   person, place, time  Memory:   normal  Cranial Nerves:  cranial nerves II-XII are grossly intact  Motor Exam:  Motor exam is symmetrical 5 out of 5 all extremities bilaterally  SKIN:  normal skin color, texture, turgor      CBC:   Recent Labs     06/19/19  0605 06/19/19  1511   WBC 6.7  --    HGB 7.2* 7.3*     --      BMP:    Recent Labs     06/19/19  0605      K 3.9      CO2 28   BUN 16   CREATININE 0.5   GLUCOSE 121*        Ref. Range 6/19/2019 15:11   Ferritin Latest Ref Range: 10 - 291 ng/mL 98   Iron Latest Ref Range: 50 - 170 ug/dL 20 (L)   Folate Latest Ref Range: 4.8 - 24.2 ng/mL > 20.0   Vitamin B-12 Latest Ref Range: 211 - 911 pg/mL 1620 (H)   Immature Retic Fract Latest Ref Range: 3.0 - 15.9 % 16.5 (H)   Retic Ct Abs Latest Ref Range: 0.5 - 2.0 % 1.7   Absolute Retic # Latest Ref Range: 20.0 - 115.0 thou/mm3 41.0   Retic Hemoglobin Latest Ref Range: 28.2 - 35.7 pg 26.7 (L)     Assessment and Plan   1. Fall with fracture rt proximal humerus  2. Fracture L1 vertebral body  3. COPD  4. Bronchiectasis  5. Acute on chronic hypoxemic resp failure  6. RLS  7. Anemia,      Analgesics, bowel regimen. Bronchodilators.   Oxygen prn  feso4    Patient Active Problem List   Diagnosis Code    [employer] dual pacemaker Z95.0    Restless legs syndrome G25.81    Anxiety F41.9    Depression F32.9    Chronic obstructive lung disease J44.9    Irregular heart beat I49.9    Joint pain M25.50    History of pneumonia Z87.01    Exertional dyspnea R06.09    Epigastric discomfort     Hernia K46.9    GERD (gastroesophageal reflux disease) K21.9    Abnormal stress test R94.39    CAD (coronary artery disease) lad stent  I25.10    Atrial flutter, paroxysmal ([facility]) I48.92    Unstable angina ([facility]) I20.0    ASHD (arteriosclerotic heart disease) I25.10    NSTEMI (non-ST elevated myocardial infarction) ([facility]) I21.4    Pacemaker battery depletion Z45.010    Closed compression fracture of body of L1 vertebra ([facility]) S32.010A       Sosa Rodriguez MD  Admitting Internist

## 2019-06-20 NOTE — DISCHARGE SUMMARY
Patient ID:  Emani Gibson  [id number]  58 y.o.  1935    Admit date: 6/16/2019    Discharge date and time: 06/20/19 1030    Admitting Physician: Megan Faustin MD     Discharge Physician: Megan Faustin MD    Admission Diagnoses: Closed compression fracture of body of L1 vertebra [facility]) [S32.010A]    Discharge Diagnoses: Closed compression fracture of body of L1 vertebra [facility]) [place] [address]: Lashonda Van is an 80year old female who suffered a fall on 6/15/19 sustaining a L1 compression fracture, a right proximal humerus fracture, and a right ulna styloid fracture. She was able to continue to bear weight but with progressive difficulty. All fractures are being treated conservatively. Dr Christina Bowman is managing her L1 fracture. While admitted she was found to have acute blood loss anemia and acute on chronic hypoxemic respiratory failure. Hospital medicine has been managing her acute and chronic medical conditions. Her pain is well controlled and she has been working with PT/OT. VS remained stable throughout her stay.      Consults:  Medicine, Neurosurgery    Treatments:     Disposition: Fair/stable/TCU    Patient Instructions:      Medication List      STOP taking these medications    lisinopril 2.5 MG tablet  Commonly known as:  PRINIVIL;ZESTRIL     sertraline 100 MG tablet  Commonly known as:  ZOLOFT        ASK your doctor about these medications    albuterol-ipratropium  MCG/ACT Aers inhaler  Commonly known as:  COMBIVENT RESPIMAT  Inhale 1 puff into the lungs every 6 hours as needed     amLODIPine 10 MG tablet  Commonly known as:  NORVASC  Take 0.5 tablets by mouth daily     atorvastatin 40 MG tablet  Commonly known as:  LIPITOR  Take 1 tablet by mouth nightly     ciprofloxacin 750 MG tablet  Commonly known as:  CIPRO     clopidogrel 75 MG tablet  Commonly known as:  PLAVIX  Take 1 tablet by mouth daily     ELIQUIS 5 MG Tabs tablet  Generic drug:  apixaban  TAKE 1 TABLET TWICE A DAY     LASIX 40 MG tablet  Generic drug:  furosemide     metoprolol tartrate 50 MG tablet  Commonly known as:  LOPRESSOR  Take 1 tablet by mouth 2 times daily     nitroGLYCERIN 0.4 MG SL tablet  Commonly known as:  NITROSTAT  Place 1 tablet under the tongue every 5 minutes as needed for Chest pain up to max of 3 total doses. If no relief after 1 dose, call 911. pantoprazole 40 MG tablet  Commonly known as:  PROTONIX  Take 1 tablet by mouth 2 times daily     PRESERVISION AREDS PO     rOPINIRole 0.25 MG tablet  Commonly known as:  REQUIP          Activity: WYATT MONTOYA  Diet: regular  Wound Care: no wounds    Follow-up Dr. Keyon Dueñas in 4 weeks.      Signed:  Myesha Leiva  06/20/19  8:45 AM

## 2019-06-21 ENCOUNTER — APPOINTMENT (OUTPATIENT)
Dept: GENERAL RADIOLOGY | Age: 84
DRG: 561 | End: 2019-06-21
Attending: INTERNAL MEDICINE
Payer: MEDICARE

## 2019-06-21 LAB
BASOPHILS # BLD: 0.4 %
BASOPHILS ABSOLUTE: 0 THOU/MM3 (ref 0–0.1)
EOSINOPHIL # BLD: 3.5 %
EOSINOPHILS ABSOLUTE: 0.3 THOU/MM3 (ref 0–0.4)
ERYTHROCYTE [DISTWIDTH] IN BLOOD BY AUTOMATED COUNT: 14.1 % (ref 11.5–14.5)
ERYTHROCYTE [DISTWIDTH] IN BLOOD BY AUTOMATED COUNT: 50 FL (ref 35–45)
HCT VFR BLD CALC: 25 % (ref 37–47)
HEMOGLOBIN: 8 GM/DL (ref 12–16)
IMMATURE GRANS (ABS): 0.03 THOU/MM3 (ref 0–0.07)
IMMATURE GRANULOCYTES: 0.4 %
LYMPHOCYTES # BLD: 12.3 %
LYMPHOCYTES ABSOLUTE: 1 THOU/MM3 (ref 1–4.8)
MCH RBC QN AUTO: 30.9 PG (ref 26–33)
MCHC RBC AUTO-ENTMCNC: 32 GM/DL (ref 32.2–35.5)
MCV RBC AUTO: 96.5 FL (ref 81–99)
MONOCYTES # BLD: 10.4 %
MONOCYTES ABSOLUTE: 0.8 THOU/MM3 (ref 0.4–1.3)
NUCLEATED RED BLOOD CELLS: 0 /100 WBC
PLATELET # BLD: 224 THOU/MM3 (ref 130–400)
PMV BLD AUTO: 9.5 FL (ref 9.4–12.4)
RBC # BLD: 2.59 MILL/MM3 (ref 4.2–5.4)
SEG NEUTROPHILS: 73 %
SEGMENTED NEUTROPHILS ABSOLUTE COUNT: 5.8 THOU/MM3 (ref 1.8–7.7)
WBC # BLD: 7.9 THOU/MM3 (ref 4.8–10.8)

## 2019-06-21 PROCEDURE — 86580 TB INTRADERMAL TEST: CPT

## 2019-06-21 PROCEDURE — 94640 AIRWAY INHALATION TREATMENT: CPT

## 2019-06-21 PROCEDURE — 97167 OT EVAL HIGH COMPLEX 60 MIN: CPT

## 2019-06-21 PROCEDURE — 6370000000 HC RX 637 (ALT 250 FOR IP): Performed by: ORTHOPAEDIC SURGERY

## 2019-06-21 PROCEDURE — 85025 COMPLETE CBC W/AUTO DIFF WBC: CPT

## 2019-06-21 PROCEDURE — 97530 THERAPEUTIC ACTIVITIES: CPT

## 2019-06-21 PROCEDURE — 71045 X-RAY EXAM CHEST 1 VIEW: CPT

## 2019-06-21 PROCEDURE — 1290000000 HC SEMI PRIVATE OTHER R&B

## 2019-06-21 PROCEDURE — 97162 PT EVAL MOD COMPLEX 30 MIN: CPT

## 2019-06-21 PROCEDURE — 36415 COLL VENOUS BLD VENIPUNCTURE: CPT

## 2019-06-21 PROCEDURE — 2700000000 HC OXYGEN THERAPY PER DAY

## 2019-06-21 PROCEDURE — 97535 SELF CARE MNGMENT TRAINING: CPT

## 2019-06-21 PROCEDURE — 6370000000 HC RX 637 (ALT 250 FOR IP): Performed by: INTERNAL MEDICINE

## 2019-06-21 RX ORDER — ZOLPIDEM TARTRATE 5 MG/1
5 TABLET ORAL NIGHTLY PRN
Status: DISCONTINUED | OUTPATIENT
Start: 2019-06-21 | End: 2019-07-03 | Stop reason: HOSPADM

## 2019-06-21 RX ORDER — BUSPIRONE HYDROCHLORIDE 10 MG/1
10 TABLET ORAL 3 TIMES DAILY
Status: DISCONTINUED | OUTPATIENT
Start: 2019-06-21 | End: 2019-07-03 | Stop reason: HOSPADM

## 2019-06-21 RX ADMIN — MULTIPLE VITAMINS W/ MINERALS TAB 1 TABLET: TAB at 10:48

## 2019-06-21 RX ADMIN — HYDROCODONE BITARTRATE AND ACETAMINOPHEN 1 TABLET: 5; 325 TABLET ORAL at 15:35

## 2019-06-21 RX ADMIN — ATORVASTATIN CALCIUM 40 MG: 40 TABLET, FILM COATED ORAL at 22:35

## 2019-06-21 RX ADMIN — HYDROCODONE BITARTRATE AND ACETAMINOPHEN 2 TABLET: 5; 325 TABLET ORAL at 06:05

## 2019-06-21 RX ADMIN — APIXABAN 5 MG: 5 TABLET, FILM COATED ORAL at 10:48

## 2019-06-21 RX ADMIN — FERROUS SULFATE TAB 325 MG (65 MG ELEMENTAL FE) 325 MG: 325 (65 FE) TAB at 10:48

## 2019-06-21 RX ADMIN — BUSPIRONE HYDROCHLORIDE 10 MG: 10 TABLET ORAL at 22:35

## 2019-06-21 RX ADMIN — PANTOPRAZOLE SODIUM 40 MG: 40 TABLET, DELAYED RELEASE ORAL at 15:35

## 2019-06-21 RX ADMIN — APIXABAN 5 MG: 5 TABLET, FILM COATED ORAL at 22:35

## 2019-06-21 RX ADMIN — DOCUSATE SODIUM 100 MG: 100 CAPSULE, LIQUID FILLED ORAL at 22:36

## 2019-06-21 RX ADMIN — PANTOPRAZOLE SODIUM 40 MG: 40 TABLET, DELAYED RELEASE ORAL at 06:04

## 2019-06-21 RX ADMIN — IPRATROPIUM BROMIDE AND ALBUTEROL SULFATE 1 AMPULE: .5; 3 SOLUTION RESPIRATORY (INHALATION) at 10:03

## 2019-06-21 RX ADMIN — IPRATROPIUM BROMIDE AND ALBUTEROL SULFATE 1 AMPULE: .5; 3 SOLUTION RESPIRATORY (INHALATION) at 13:07

## 2019-06-21 RX ADMIN — METOPROLOL TARTRATE 50 MG: 50 TABLET, FILM COATED ORAL at 10:48

## 2019-06-21 RX ADMIN — FERROUS SULFATE TAB 325 MG (65 MG ELEMENTAL FE) 325 MG: 325 (65 FE) TAB at 15:35

## 2019-06-21 RX ADMIN — ROPINIROLE HYDROCHLORIDE 1 MG: 1 TABLET, FILM COATED ORAL at 22:36

## 2019-06-21 RX ADMIN — AMLODIPINE BESYLATE 5 MG: 5 TABLET ORAL at 10:48

## 2019-06-21 RX ADMIN — IPRATROPIUM BROMIDE AND ALBUTEROL SULFATE 1 AMPULE: .5; 3 SOLUTION RESPIRATORY (INHALATION) at 17:48

## 2019-06-21 RX ADMIN — METOPROLOL TARTRATE 50 MG: 50 TABLET, FILM COATED ORAL at 22:35

## 2019-06-21 ASSESSMENT — PAIN SCALES - GENERAL
PAINLEVEL_OUTOF10: 5
PAINLEVEL_OUTOF10: 0
PAINLEVEL_OUTOF10: 8
PAINLEVEL_OUTOF10: 8

## 2019-06-21 ASSESSMENT — PAIN DESCRIPTION - LOCATION: LOCATION: ARM

## 2019-06-21 ASSESSMENT — PAIN DESCRIPTION - ORIENTATION: ORIENTATION: RIGHT

## 2019-06-21 ASSESSMENT — PAIN DESCRIPTION - PAIN TYPE: TYPE: ACUTE PAIN

## 2019-06-21 NOTE — PROGRESS NOTES
[employer]       Balance  Posture: Good  Sitting - Static: Good  Sitting - Dynamic: Good, -    Rolling to Left: Stand by assistance  Rolling to Right: Stand by assistance  Supine to Sit: Stand by assistance  Sit to Supine: Contact guard assistance  Comment: bed mobility completed with HOB flat. instruction and cues for log rolling. further mobility assessment deferred at this time as the pt adamantly refuses to put on her TLSO and no other corset present until later today per RN. Activity Tolerance:  Activity Tolerance: Patient Tolerated treatment well;Patient limited by fatigue    Treatment Initiated: PT clintal completed. Also completed additional bed mobility training, with instruction in log roll technique for safety due to spinal fracture, see above. Assessment: Body structures, Functions, Activity limitations: Decreased functional mobility , Decreased strength, Decreased balance  Assessment: Patient tolerated PT session fairly well. Overall participation/assessment is limited this date due to the pt's refusal to don TLSO due to pain/discomfort, and another lumbar corset not present at this time. She presents with some weakness and decreased balance due to multiple fractures s/p fall, thus she will benefit from continued PT services. Prognosis: Good    Clinical Presentation: Moderate - Evolving with Changing Characteristics: . Due to an analysis of data from a detailed assessment, a consideration of several treatment options, the presence of co morbidities that affect the POC, and the need for minimal to moderate modifications or assistance needed to complete the evaluation. Decision Making:  Moderate Complexitybased on patient assessment and decision making process of determining plan of care and establishing reasonable expectations for measurable functional outcomes         Discharge Recommendations:  Discharge Recommendations: Continue to assess pending progress    Patient

## 2019-06-21 NOTE — PROGRESS NOTES
Weight    Nutrition Interventions:   Continue current diet, Start ONS  Continued Inpatient Monitoring, Education Initiated, Coordination of Care(Encouraged lean protien chocie with each meal)    Nutrition Evaluation:   · Evaluation: Goals set   · Goals: Pt. will consume 75% or more at meals during LOS.      · Monitoring: Meal Intake, Supplement Intake, Diet Tolerance, Skin Integrity, Weight, Pertinent Labs, Monitor Bowel Function      Electronically signed by Jenn Godoy RD, MATTHEW on 6/21/19 at 2:33 PM    Contact Number: [phone]

## 2019-06-21 NOTE — PROGRESS NOTES
TCU BALANCE TEST:    Balance during to/from sit to stand Contact guard assistance   Balance during walking OT: Contact guard assistance (06/21/19 1111)   Balance during turn-around and while walking OT: Contact guard assistance (06/21/19 1111)   Balance moving on and off toilet Minimal assistance (06/21/19 1111)   Balance during surface to/from surface transfers     Independent = 0  Modified Independent, Supervision, SBA = 1  CGA, Min Assist, Mod Assist, Max Assist, Dependent = 2  Not Tested/No Response = 8

## 2019-06-21 NOTE — PROGRESS NOTES
INTERNAL MEDICINE Progress Note  6/21/2019 2:33 PM  Subjective:   Admit Date: 6/20/2019  PCP: Valerie Aranda MD  Interval History:   Reports insomnia    Objective:   Vitals: BP (!) 120/54   Pulse 95   Temp 96.4 °F (35.8 °C) (Oral)   Resp 20   Ht 5' 3\" (1.6 m)   Wt 135 lb 9 oz (61.5 kg)   SpO2 95%   BMI 24.01 kg/m²   General appearance: alert and cooperative with exam  HEENT: Head: atraumatic  Neck: no adenopathy, no carotid bruit and no JVD  Lungs: diminished breath sounds bilaterally  Heart: S1, S2 normal  Abdomen: soft, non-tender; bowel sounds normal; no masses,  no organomegaly  Extremities: no edema, redness or tenderness in the calves or thighs and rt arm sling  Neurologic: Mental status: Alert, oriented, thought content appropriate      Medications:   Scheduled Meds:   amLODIPine  5 mg Oral Daily    apixaban  5 mg Oral BID    atorvastatin  40 mg Oral Nightly    docusate sodium  100 mg Oral BID    ipratropium-albuterol  1 ampule Inhalation Q4H WA    metoprolol tartrate  50 mg Oral BID    pantoprazole  40 mg Oral BID    polyethylene glycol  17 g Oral Daily    rOPINIRole  1 mg Oral Nightly    therapeutic multivitamin-minerals  1 tablet Oral Daily    ferrous sulfate  325 mg Oral BID WC     Continuous Infusions:    Lab Results:   CBC:   Recent Labs     06/19/19  0605 06/19/19  1511 06/21/19  1206   WBC 6.7  --  7.9   HGB 7.2* 7.3* 8.0*     --  224     BMP:    Recent Labs     06/19/19  0605      K 3.9      CO2 28   BUN 16   CREATININE 0.5   GLUCOSE 121*     FOLATE:    Lab Results   Component Value Date    FOLATE > 20.0 06/19/2019     IRON:    Lab Results   Component Value Date    IRON 20 06/19/2019     FERRITIN:    Lab Results   Component Value Date    FERRITIN 98 06/19/2019       Assessment and Plan:   1. Fall with fracture rt proximal humerus  2. Fracture L1 vertebral body  3. COPD  4. Bronchiectasis  5. Acute on chronic hypoxemic resp failure  6. RLS  7.  Anemia,

## 2019-06-21 NOTE — PROGRESS NOTES
[facility] 60  INPATIENT OCCUPATIONAL THERAPY  [facility] SKILLED NURSING UNIT  EVALUATION  Time In: 825  Time Out: 09  Timed Code Treatment Minutes: 54 Minutes  Minutes: 70          Date: 2019  Patient Name: Dariel Martinez,   Gender: female      MRN: [id number]  : 1935  (80 y.o.)  Referring Practitioner: Graciela Osman MD; Attending: Dr. Alonzo Lockett  Diagnosis: Closed compression fracture of body of L1 vertebra  Additional Pertinent Hx: Pt admitted following a fall 2 days ago in which she fell onto a out stretched Right UE causing humerus and ulna fx and a L1 vert fx; transfer to TCU 19    Restrictions/Precautions:  Restrictions/Precautions: Weight Bearing, General Precautions, Fall Risk  Right Upper Extremity Weight Bearing: Non Weight Bearing  Right Upper Extremity Brace/Splint: sling and swath for fx humerus and ulna and wrist splint  Required Braces or Orthoses  Spinal: Thoracic Lumbar Sacral Orthotics  Spinal Other: on when OOB  Right Upper Extremity Brace/Splint: Sling  Right Upper Extremity Brace/Splint: sling and swath for fx humerus and ulna and wrist splint  Position Activity Restriction  Other position/activity restrictions: TLSO on when OOB , NWB right UE, May come out of swathe, May come out of sling for gentle elbow ROM.     Subjective  Chart Reviewed: Yes, Imaging, Orders, Progress Notes, Previous Admission, History and Physical  Patient assessed for rehabilitation services?: Yes  Family / Caregiver Present: No    Subjective: Pleasant and cooperative    Pain:  Pain Assessment  Patient Currently in Pain: Denies    Social/Functional History:  Lives With: Alone  Type of Home: House  Home Layout: Two level, Able to Live on Main level with bedroom/bathroom  Home Equipment: [address], Rolling walker   Bathroom Shower/Tub: Tub/Shower unit  Bathroom Toilet: Standard    Receives Help From: Neighbor  ADL Assistance: Independent  Homemaking Assistance: Hal Dominique

## 2019-06-22 LAB
SOLUBLE TRANSFERRIN RECEPT: 2.3 MG/L (ref 1.9–4.4)
TRANSFERRIN: 174 MG/DL (ref 200–400)

## 2019-06-22 PROCEDURE — 97116 GAIT TRAINING THERAPY: CPT

## 2019-06-22 PROCEDURE — 94640 AIRWAY INHALATION TREATMENT: CPT

## 2019-06-22 PROCEDURE — 6370000000 HC RX 637 (ALT 250 FOR IP): Performed by: INTERNAL MEDICINE

## 2019-06-22 PROCEDURE — 6370000000 HC RX 637 (ALT 250 FOR IP): Performed by: ORTHOPAEDIC SURGERY

## 2019-06-22 PROCEDURE — 2700000000 HC OXYGEN THERAPY PER DAY

## 2019-06-22 PROCEDURE — 94761 N-INVAS EAR/PLS OXIMETRY MLT: CPT

## 2019-06-22 PROCEDURE — 1290000000 HC SEMI PRIVATE OTHER R&B

## 2019-06-22 PROCEDURE — 97530 THERAPEUTIC ACTIVITIES: CPT

## 2019-06-22 RX ADMIN — HYDROCODONE BITARTRATE AND ACETAMINOPHEN 2 TABLET: 5; 325 TABLET ORAL at 15:59

## 2019-06-22 RX ADMIN — METOPROLOL TARTRATE 50 MG: 50 TABLET, FILM COATED ORAL at 10:18

## 2019-06-22 RX ADMIN — BUSPIRONE HYDROCHLORIDE 10 MG: 10 TABLET ORAL at 22:16

## 2019-06-22 RX ADMIN — DOCUSATE SODIUM 100 MG: 100 CAPSULE, LIQUID FILLED ORAL at 22:16

## 2019-06-22 RX ADMIN — BUSPIRONE HYDROCHLORIDE 10 MG: 10 TABLET ORAL at 15:58

## 2019-06-22 RX ADMIN — ATORVASTATIN CALCIUM 40 MG: 40 TABLET, FILM COATED ORAL at 22:16

## 2019-06-22 RX ADMIN — FERROUS SULFATE TAB 325 MG (65 MG ELEMENTAL FE) 325 MG: 325 (65 FE) TAB at 10:18

## 2019-06-22 RX ADMIN — APIXABAN 5 MG: 5 TABLET, FILM COATED ORAL at 10:18

## 2019-06-22 RX ADMIN — PANTOPRAZOLE SODIUM 40 MG: 40 TABLET, DELAYED RELEASE ORAL at 15:58

## 2019-06-22 RX ADMIN — IPRATROPIUM BROMIDE AND ALBUTEROL SULFATE 1 AMPULE: .5; 3 SOLUTION RESPIRATORY (INHALATION) at 09:52

## 2019-06-22 RX ADMIN — IPRATROPIUM BROMIDE AND ALBUTEROL SULFATE 1 AMPULE: .5; 3 SOLUTION RESPIRATORY (INHALATION) at 13:37

## 2019-06-22 RX ADMIN — METOPROLOL TARTRATE 50 MG: 50 TABLET, FILM COATED ORAL at 22:16

## 2019-06-22 RX ADMIN — IPRATROPIUM BROMIDE AND ALBUTEROL SULFATE 1 AMPULE: .5; 3 SOLUTION RESPIRATORY (INHALATION) at 04:55

## 2019-06-22 RX ADMIN — APIXABAN 5 MG: 5 TABLET, FILM COATED ORAL at 22:15

## 2019-06-22 RX ADMIN — MULTIPLE VITAMINS W/ MINERALS TAB 1 TABLET: TAB at 10:18

## 2019-06-22 RX ADMIN — PANTOPRAZOLE SODIUM 40 MG: 40 TABLET, DELAYED RELEASE ORAL at 05:37

## 2019-06-22 RX ADMIN — AMLODIPINE BESYLATE 5 MG: 5 TABLET ORAL at 10:18

## 2019-06-22 RX ADMIN — DOCUSATE SODIUM 100 MG: 100 CAPSULE, LIQUID FILLED ORAL at 10:18

## 2019-06-22 RX ADMIN — ROPINIROLE HYDROCHLORIDE 1 MG: 1 TABLET, FILM COATED ORAL at 22:16

## 2019-06-22 RX ADMIN — FERROUS SULFATE TAB 325 MG (65 MG ELEMENTAL FE) 325 MG: 325 (65 FE) TAB at 15:58

## 2019-06-22 RX ADMIN — HYDROCODONE BITARTRATE AND ACETAMINOPHEN 2 TABLET: 5; 325 TABLET ORAL at 05:36

## 2019-06-22 RX ADMIN — HYDROCODONE BITARTRATE AND ACETAMINOPHEN 2 TABLET: 5; 325 TABLET ORAL at 22:16

## 2019-06-22 RX ADMIN — BUSPIRONE HYDROCHLORIDE 10 MG: 10 TABLET ORAL at 10:18

## 2019-06-22 ASSESSMENT — PAIN DESCRIPTION - LOCATION: LOCATION: SHOULDER

## 2019-06-22 ASSESSMENT — PAIN DESCRIPTION - FREQUENCY: FREQUENCY: INTERMITTENT

## 2019-06-22 ASSESSMENT — PAIN DESCRIPTION - ORIENTATION: ORIENTATION: RIGHT

## 2019-06-22 ASSESSMENT — PAIN DESCRIPTION - ONSET: ONSET: ON-GOING

## 2019-06-22 ASSESSMENT — PAIN DESCRIPTION - PROGRESSION
CLINICAL_PROGRESSION: GRADUALLY IMPROVING

## 2019-06-22 ASSESSMENT — PAIN SCALES - GENERAL
PAINLEVEL_OUTOF10: 9
PAINLEVEL_OUTOF10: 7
PAINLEVEL_OUTOF10: 4
PAINLEVEL_OUTOF10: 8

## 2019-06-22 ASSESSMENT — PAIN - FUNCTIONAL ASSESSMENT: PAIN_FUNCTIONAL_ASSESSMENT: PREVENTS OR INTERFERES SOME ACTIVE ACTIVITIES AND ADLS

## 2019-06-22 ASSESSMENT — PAIN DESCRIPTION - DESCRIPTORS: DESCRIPTORS: ACHING

## 2019-06-22 ASSESSMENT — PAIN DESCRIPTION - PAIN TYPE: TYPE: ACUTE PAIN

## 2019-06-22 NOTE — PROGRESS NOTES
and friends live close by that would be able to provide intermittent assistance. Per pt report, plenty of people to assist if needed. Niece works on 9, off 9, reports she will be able to assist when off work for 7 days straight. Subjective:     Subjective: RN approved session, pt is supine in bed, is agreeable to OOB mobility with lumbar corset. Extensive time spent on education for positioning of R UE when supine and seated for proper alignment and comfort. Pain:   . Pre Treatment Pain Screening  Pain at present: 7  Scale Used: Numeric Score  Intervention List: Patient able to continue with treatment    Social/Functional:  Type of Home: House  Home Layout: Two level, Able to Live on Main level with bedroom/bathroom  Home Access: Stairs to enter without rails  Entrance Stairs - Number of Steps: 1  Home Equipment: Wheelchair-manual, Rolling walker     Objective:  Supine to Sit: Stand by assistance(HOB slightly elevated, use of bed rail with L UE)    Transfers  Sit to Stand: Contact guard assistance(from EOB)  Stand to sit: Contact guard assistance       Ambulation 1  Surface: level tile  Device: Hemiwalker  Assistance: Contact guard assistance  Quality of Gait: Pt amb with decreased speed and [place], discontinuous steps, decreased step length. Distance: 60 feet         Balance  Standing - Dynamic: Fair;-  Comments: Pt retrieves object from floor with reacher in L hand with CGA for balance                Activity Tolerance:  Activity Tolerance: Patient Tolerated treatment well    Assessment: Body structures, Functions, Activity limitations: Decreased functional mobility , Decreased strength, Decreased balance, Increased Pain  Assessment: Pt tolerates session well, is agreeable to OOB mobility with lumbar corset. Sling positioning properly while seated EOB. PT to continue to progress strength and functional mobility to ensure safe DC home.   Prognosis: Good          Discharge Recommendations:  Discharge Recommendations: Continue to assess pending progress    Patient Education:  Patient Education: transfers, positioning, gait    Equipment Recommendations:  Equipment Needed: (continue to assess--may need hemiwalker)  Other: may need hemiwalker    Safety:  Type of devices: All fall risk precautions in place, Call light within reach, Chair alarm in place, Gait belt, Left in chair, Patient at risk for falls    Plan:  Times per week: 6x  Current Treatment Recommendations: Strengthening, Functional Mobility Training, Balance Training, Transfer Training, Endurance Training, Gait Training, Patient/Caregiver Education & Training, Stair training, Equipment Evaluation, Education, & procurement, Safety Education & Training, Home Exercise Program    Goals:  Patient goals : Get stronger and go home    Short term goals  Time Frame for Short term goals: 10 days  Short term goal 1: Patient will transfer supine <--> sit utilizing log roll technique mod I, in order to get into/out of bed. Short term goal 2: Pt to transfer sit <--> stand Supervision for increased functional mobility. Short term goal 3: Pt to ambulate 100 feet with hemiwalker SBA for household ambulation. Short term goal 4: Pt to improve dynamic standing balance to fair+ to minimize fall risk. Long term goals  Time Frame for Long term goals : 2 weeks  Long term goal 1: Pt to ambulate >150 feet with hemiwalker with Supervision for household ambulation. Long term goal 2: Pt to ascend/descend 1 step without HR SBA for home entry. Long term goal 3: Pt to perform car transfer SBA to enable pt to get in/out of the car. If patient is discharged prior to progress note completion, this note is to serve as the discharge note with all goals being unmet unless indicated otherwise.

## 2019-06-23 PROCEDURE — 6370000000 HC RX 637 (ALT 250 FOR IP): Performed by: ORTHOPAEDIC SURGERY

## 2019-06-23 PROCEDURE — 6370000000 HC RX 637 (ALT 250 FOR IP): Performed by: INTERNAL MEDICINE

## 2019-06-23 PROCEDURE — 97530 THERAPEUTIC ACTIVITIES: CPT

## 2019-06-23 PROCEDURE — 94640 AIRWAY INHALATION TREATMENT: CPT

## 2019-06-23 PROCEDURE — 97116 GAIT TRAINING THERAPY: CPT

## 2019-06-23 PROCEDURE — 97110 THERAPEUTIC EXERCISES: CPT

## 2019-06-23 PROCEDURE — 1290000000 HC SEMI PRIVATE OTHER R&B

## 2019-06-23 RX ADMIN — METOPROLOL TARTRATE 50 MG: 50 TABLET, FILM COATED ORAL at 20:43

## 2019-06-23 RX ADMIN — BUSPIRONE HYDROCHLORIDE 10 MG: 10 TABLET ORAL at 09:13

## 2019-06-23 RX ADMIN — APIXABAN 5 MG: 5 TABLET, FILM COATED ORAL at 20:43

## 2019-06-23 RX ADMIN — DOCUSATE SODIUM 100 MG: 100 CAPSULE, LIQUID FILLED ORAL at 20:43

## 2019-06-23 RX ADMIN — PANTOPRAZOLE SODIUM 40 MG: 40 TABLET, DELAYED RELEASE ORAL at 05:39

## 2019-06-23 RX ADMIN — PANTOPRAZOLE SODIUM 40 MG: 40 TABLET, DELAYED RELEASE ORAL at 17:28

## 2019-06-23 RX ADMIN — ROPINIROLE HYDROCHLORIDE 1 MG: 1 TABLET, FILM COATED ORAL at 20:43

## 2019-06-23 RX ADMIN — FERROUS SULFATE TAB 325 MG (65 MG ELEMENTAL FE) 325 MG: 325 (65 FE) TAB at 17:28

## 2019-06-23 RX ADMIN — MULTIPLE VITAMINS W/ MINERALS TAB 1 TABLET: TAB at 09:13

## 2019-06-23 RX ADMIN — IPRATROPIUM BROMIDE AND ALBUTEROL SULFATE 1 AMPULE: .5; 3 SOLUTION RESPIRATORY (INHALATION) at 08:59

## 2019-06-23 RX ADMIN — HYDROCODONE BITARTRATE AND ACETAMINOPHEN 2 TABLET: 5; 325 TABLET ORAL at 09:13

## 2019-06-23 RX ADMIN — FERROUS SULFATE TAB 325 MG (65 MG ELEMENTAL FE) 325 MG: 325 (65 FE) TAB at 09:13

## 2019-06-23 RX ADMIN — HYDROCODONE BITARTRATE AND ACETAMINOPHEN 2 TABLET: 5; 325 TABLET ORAL at 18:05

## 2019-06-23 RX ADMIN — DOCUSATE SODIUM 100 MG: 100 CAPSULE, LIQUID FILLED ORAL at 09:13

## 2019-06-23 RX ADMIN — AMLODIPINE BESYLATE 5 MG: 5 TABLET ORAL at 09:13

## 2019-06-23 RX ADMIN — APIXABAN 5 MG: 5 TABLET, FILM COATED ORAL at 09:13

## 2019-06-23 RX ADMIN — ATORVASTATIN CALCIUM 40 MG: 40 TABLET, FILM COATED ORAL at 20:43

## 2019-06-23 RX ADMIN — METOPROLOL TARTRATE 50 MG: 50 TABLET, FILM COATED ORAL at 09:13

## 2019-06-23 RX ADMIN — BUSPIRONE HYDROCHLORIDE 10 MG: 10 TABLET ORAL at 20:43

## 2019-06-23 RX ADMIN — IPRATROPIUM BROMIDE AND ALBUTEROL SULFATE 1 AMPULE: .5; 3 SOLUTION RESPIRATORY (INHALATION) at 05:27

## 2019-06-23 RX ADMIN — BUSPIRONE HYDROCHLORIDE 10 MG: 10 TABLET ORAL at 17:28

## 2019-06-23 ASSESSMENT — PAIN SCALES - GENERAL
PAINLEVEL_OUTOF10: 7
PAINLEVEL_OUTOF10: 0

## 2019-06-24 PROCEDURE — 6370000000 HC RX 637 (ALT 250 FOR IP): Performed by: ORTHOPAEDIC SURGERY

## 2019-06-24 PROCEDURE — 97116 GAIT TRAINING THERAPY: CPT

## 2019-06-24 PROCEDURE — 6370000000 HC RX 637 (ALT 250 FOR IP): Performed by: INTERNAL MEDICINE

## 2019-06-24 PROCEDURE — 1290000000 HC SEMI PRIVATE OTHER R&B

## 2019-06-24 PROCEDURE — 97535 SELF CARE MNGMENT TRAINING: CPT

## 2019-06-24 PROCEDURE — 97530 THERAPEUTIC ACTIVITIES: CPT

## 2019-06-24 PROCEDURE — 2700000000 HC OXYGEN THERAPY PER DAY

## 2019-06-24 PROCEDURE — 97110 THERAPEUTIC EXERCISES: CPT

## 2019-06-24 PROCEDURE — 94640 AIRWAY INHALATION TREATMENT: CPT

## 2019-06-24 RX ADMIN — PANTOPRAZOLE SODIUM 40 MG: 40 TABLET, DELAYED RELEASE ORAL at 15:27

## 2019-06-24 RX ADMIN — DOCUSATE SODIUM 100 MG: 100 CAPSULE, LIQUID FILLED ORAL at 13:32

## 2019-06-24 RX ADMIN — MULTIPLE VITAMINS W/ MINERALS TAB 1 TABLET: TAB at 12:04

## 2019-06-24 RX ADMIN — IPRATROPIUM BROMIDE AND ALBUTEROL SULFATE 1 AMPULE: .5; 3 SOLUTION RESPIRATORY (INHALATION) at 09:19

## 2019-06-24 RX ADMIN — HYDROCODONE BITARTRATE AND ACETAMINOPHEN 2 TABLET: 5; 325 TABLET ORAL at 05:56

## 2019-06-24 RX ADMIN — FERROUS SULFATE TAB 325 MG (65 MG ELEMENTAL FE) 325 MG: 325 (65 FE) TAB at 17:37

## 2019-06-24 RX ADMIN — DOCUSATE SODIUM 100 MG: 100 CAPSULE, LIQUID FILLED ORAL at 20:28

## 2019-06-24 RX ADMIN — HYDROCODONE BITARTRATE AND ACETAMINOPHEN 2 TABLET: 5; 325 TABLET ORAL at 00:12

## 2019-06-24 RX ADMIN — BUSPIRONE HYDROCHLORIDE 10 MG: 10 TABLET ORAL at 20:28

## 2019-06-24 RX ADMIN — APIXABAN 5 MG: 5 TABLET, FILM COATED ORAL at 20:28

## 2019-06-24 RX ADMIN — PANTOPRAZOLE SODIUM 40 MG: 40 TABLET, DELAYED RELEASE ORAL at 05:50

## 2019-06-24 RX ADMIN — DICLOFENAC 4 G: 10 GEL TOPICAL at 22:40

## 2019-06-24 RX ADMIN — HYDROCODONE BITARTRATE AND ACETAMINOPHEN 1 TABLET: 5; 325 TABLET ORAL at 12:01

## 2019-06-24 RX ADMIN — BUSPIRONE HYDROCHLORIDE 10 MG: 10 TABLET ORAL at 12:02

## 2019-06-24 RX ADMIN — BUSPIRONE HYDROCHLORIDE 10 MG: 10 TABLET ORAL at 15:27

## 2019-06-24 RX ADMIN — FERROUS SULFATE TAB 325 MG (65 MG ELEMENTAL FE) 325 MG: 325 (65 FE) TAB at 12:02

## 2019-06-24 RX ADMIN — APIXABAN 5 MG: 5 TABLET, FILM COATED ORAL at 12:05

## 2019-06-24 RX ADMIN — ROPINIROLE HYDROCHLORIDE 1 MG: 1 TABLET, FILM COATED ORAL at 20:28

## 2019-06-24 RX ADMIN — IPRATROPIUM BROMIDE AND ALBUTEROL SULFATE 1 AMPULE: .5; 3 SOLUTION RESPIRATORY (INHALATION) at 16:33

## 2019-06-24 RX ADMIN — IPRATROPIUM BROMIDE AND ALBUTEROL SULFATE 1 AMPULE: .5; 3 SOLUTION RESPIRATORY (INHALATION) at 06:03

## 2019-06-24 RX ADMIN — AMLODIPINE BESYLATE 5 MG: 5 TABLET ORAL at 13:33

## 2019-06-24 RX ADMIN — ATORVASTATIN CALCIUM 40 MG: 40 TABLET, FILM COATED ORAL at 20:28

## 2019-06-24 RX ADMIN — METOPROLOL TARTRATE 50 MG: 50 TABLET, FILM COATED ORAL at 20:28

## 2019-06-24 RX ADMIN — METOPROLOL TARTRATE 50 MG: 50 TABLET, FILM COATED ORAL at 13:33

## 2019-06-24 ASSESSMENT — PAIN DESCRIPTION - DESCRIPTORS: DESCRIPTORS: ACHING

## 2019-06-24 ASSESSMENT — PAIN DESCRIPTION - FREQUENCY: FREQUENCY: INTERMITTENT

## 2019-06-24 ASSESSMENT — PAIN DESCRIPTION - PAIN TYPE: TYPE: ACUTE PAIN

## 2019-06-24 ASSESSMENT — PAIN SCALES - GENERAL
PAINLEVEL_OUTOF10: 7
PAINLEVEL_OUTOF10: 8
PAINLEVEL_OUTOF10: 7
PAINLEVEL_OUTOF10: 7

## 2019-06-24 ASSESSMENT — PAIN DESCRIPTION - ONSET: ONSET: ON-GOING

## 2019-06-24 ASSESSMENT — PAIN DESCRIPTION - ORIENTATION: ORIENTATION: RIGHT

## 2019-06-24 ASSESSMENT — PAIN - FUNCTIONAL ASSESSMENT: PAIN_FUNCTIONAL_ASSESSMENT: PREVENTS OR INTERFERES SOME ACTIVE ACTIVITIES AND ADLS

## 2019-06-24 ASSESSMENT — PAIN DESCRIPTION - LOCATION: LOCATION: SHOULDER;BACK

## 2019-06-24 ASSESSMENT — PAIN DESCRIPTION - PROGRESSION: CLINICAL_PROGRESSION: GRADUALLY IMPROVING

## 2019-06-24 NOTE — PROGRESS NOTES
ambulation. Short term goal 4: Pt to improve dynamic standing balance to fair+ to minimize fall risk. Long term goals  Time Frame for Long term goals : 2 weeks  Long term goal 1: Pt to ambulate >150 feet with hemiwalker with Supervision for household ambulation. Long term goal 2: Pt to ascend/descend 1 step without HR SBA for home entry. Long term goal 3: Pt to perform car transfer SBA to enable pt to get in/out of the car. Following session, patient left in safe position with all fall risk precautions in place.

## 2019-06-25 PROCEDURE — 97110 THERAPEUTIC EXERCISES: CPT

## 2019-06-25 PROCEDURE — 97530 THERAPEUTIC ACTIVITIES: CPT

## 2019-06-25 PROCEDURE — 6370000000 HC RX 637 (ALT 250 FOR IP): Performed by: ORTHOPAEDIC SURGERY

## 2019-06-25 PROCEDURE — 1290000000 HC SEMI PRIVATE OTHER R&B

## 2019-06-25 PROCEDURE — 6370000000 HC RX 637 (ALT 250 FOR IP): Performed by: INTERNAL MEDICINE

## 2019-06-25 PROCEDURE — 2700000000 HC OXYGEN THERAPY PER DAY

## 2019-06-25 PROCEDURE — 97116 GAIT TRAINING THERAPY: CPT

## 2019-06-25 PROCEDURE — 94761 N-INVAS EAR/PLS OXIMETRY MLT: CPT

## 2019-06-25 PROCEDURE — 94640 AIRWAY INHALATION TREATMENT: CPT

## 2019-06-25 RX ADMIN — METOPROLOL TARTRATE 50 MG: 50 TABLET, FILM COATED ORAL at 21:00

## 2019-06-25 RX ADMIN — DOCUSATE SODIUM 100 MG: 100 CAPSULE, LIQUID FILLED ORAL at 21:00

## 2019-06-25 RX ADMIN — IPRATROPIUM BROMIDE AND ALBUTEROL SULFATE 1 AMPULE: .5; 3 SOLUTION RESPIRATORY (INHALATION) at 16:31

## 2019-06-25 RX ADMIN — BUSPIRONE HYDROCHLORIDE 10 MG: 10 TABLET ORAL at 21:00

## 2019-06-25 RX ADMIN — IPRATROPIUM BROMIDE AND ALBUTEROL SULFATE 1 AMPULE: .5; 3 SOLUTION RESPIRATORY (INHALATION) at 12:54

## 2019-06-25 RX ADMIN — ROPINIROLE HYDROCHLORIDE 1 MG: 1 TABLET, FILM COATED ORAL at 21:00

## 2019-06-25 RX ADMIN — DICLOFENAC 4 G: 10 GEL TOPICAL at 11:51

## 2019-06-25 RX ADMIN — DOCUSATE SODIUM 100 MG: 100 CAPSULE, LIQUID FILLED ORAL at 08:37

## 2019-06-25 RX ADMIN — HYDROCODONE BITARTRATE AND ACETAMINOPHEN 2 TABLET: 5; 325 TABLET ORAL at 23:35

## 2019-06-25 RX ADMIN — PANTOPRAZOLE SODIUM 40 MG: 40 TABLET, DELAYED RELEASE ORAL at 17:30

## 2019-06-25 RX ADMIN — APIXABAN 5 MG: 5 TABLET, FILM COATED ORAL at 21:00

## 2019-06-25 RX ADMIN — ATORVASTATIN CALCIUM 40 MG: 40 TABLET, FILM COATED ORAL at 21:00

## 2019-06-25 RX ADMIN — DICLOFENAC 4 G: 10 GEL TOPICAL at 21:01

## 2019-06-25 RX ADMIN — METOPROLOL TARTRATE 50 MG: 50 TABLET, FILM COATED ORAL at 08:31

## 2019-06-25 RX ADMIN — IPRATROPIUM BROMIDE AND ALBUTEROL SULFATE 1 AMPULE: .5; 3 SOLUTION RESPIRATORY (INHALATION) at 05:41

## 2019-06-25 RX ADMIN — FERROUS SULFATE TAB 325 MG (65 MG ELEMENTAL FE) 325 MG: 325 (65 FE) TAB at 17:28

## 2019-06-25 RX ADMIN — MULTIPLE VITAMINS W/ MINERALS TAB 1 TABLET: TAB at 08:31

## 2019-06-25 RX ADMIN — HYDROCODONE BITARTRATE AND ACETAMINOPHEN 2 TABLET: 5; 325 TABLET ORAL at 05:54

## 2019-06-25 RX ADMIN — AMLODIPINE BESYLATE 5 MG: 5 TABLET ORAL at 08:31

## 2019-06-25 RX ADMIN — APIXABAN 5 MG: 5 TABLET, FILM COATED ORAL at 08:31

## 2019-06-25 RX ADMIN — BUSPIRONE HYDROCHLORIDE 10 MG: 10 TABLET ORAL at 08:31

## 2019-06-25 RX ADMIN — HYDROCODONE BITARTRATE AND ACETAMINOPHEN 2 TABLET: 5; 325 TABLET ORAL at 16:10

## 2019-06-25 RX ADMIN — FERROUS SULFATE TAB 325 MG (65 MG ELEMENTAL FE) 325 MG: 325 (65 FE) TAB at 08:31

## 2019-06-25 RX ADMIN — PANTOPRAZOLE SODIUM 40 MG: 40 TABLET, DELAYED RELEASE ORAL at 05:25

## 2019-06-25 RX ADMIN — IPRATROPIUM BROMIDE AND ALBUTEROL SULFATE 1 AMPULE: .5; 3 SOLUTION RESPIRATORY (INHALATION) at 00:37

## 2019-06-25 RX ADMIN — IPRATROPIUM BROMIDE AND ALBUTEROL SULFATE 1 AMPULE: .5; 3 SOLUTION RESPIRATORY (INHALATION) at 09:15

## 2019-06-25 ASSESSMENT — PAIN SCALES - GENERAL
PAINLEVEL_OUTOF10: 8
PAINLEVEL_OUTOF10: 7
PAINLEVEL_OUTOF10: 9
PAINLEVEL_OUTOF10: 3
PAINLEVEL_OUTOF10: 8

## 2019-06-25 ASSESSMENT — PAIN - FUNCTIONAL ASSESSMENT
PAIN_FUNCTIONAL_ASSESSMENT: PREVENTS OR INTERFERES SOME ACTIVE ACTIVITIES AND ADLS
PAIN_FUNCTIONAL_ASSESSMENT: PREVENTS OR INTERFERES SOME ACTIVE ACTIVITIES AND ADLS

## 2019-06-25 ASSESSMENT — PAIN DESCRIPTION - ONSET: ONSET: ON-GOING

## 2019-06-25 ASSESSMENT — PAIN DESCRIPTION - PROGRESSION
CLINICAL_PROGRESSION: GRADUALLY WORSENING
CLINICAL_PROGRESSION: GRADUALLY IMPROVING

## 2019-06-25 ASSESSMENT — PAIN DESCRIPTION - ORIENTATION: ORIENTATION: RIGHT

## 2019-06-25 ASSESSMENT — PAIN DESCRIPTION - PAIN TYPE: TYPE: ACUTE PAIN

## 2019-06-25 ASSESSMENT — PAIN DESCRIPTION - LOCATION: LOCATION: SHOULDER;BACK

## 2019-06-25 ASSESSMENT — PAIN DESCRIPTION - DESCRIPTORS: DESCRIPTORS: ACHING

## 2019-06-25 ASSESSMENT — PAIN DESCRIPTION - FREQUENCY: FREQUENCY: INTERMITTENT

## 2019-06-25 NOTE — PROGRESS NOTES
[facility]  INPATIENT PHYSICAL THERAPY  DAILY NOTE  [address] [place] SKILLED NURSING UNIT - 8E-65/065-A    Time In: 8993  Time Out: 1152  Timed Code Treatment Minutes: 52 Minutes  Minutes: 47          Date: 2019  Patient Name: Rick Sky,  Gender:  female        MRN: [id number]  : 1935  (80 y.o.)  Referral Date : 19  Referring Practitioner: Richard Chery MD (referring); Bobbie Epley, MD (attending)  Diagnosis: Closed compression fx of body of L1 vertebrae  Additional Pertinent Hx: Pt admitted following a fall 2 days ago in which she fell onto a out stretched Right UE causing humerus and ulna fx and a L1 vert fx . Transferred to [facility] . Prior Level of Function:  Lives With: Alone  Type of Home: House  Home Layout: Two level  Home Access: Stairs to enter without rails  Entrance Stairs - Number of Steps: 1  Home Equipment: Wheelchair-manual, Rolling walker    Restrictions/Precautions:  Restrictions/Precautions: Weight Bearing, General Precautions, Fall Risk  Right Upper Extremity Weight Bearing: Non Weight Bearing  Right Upper Extremity Brace/Splint: sling and swath for fx humerus and ulna and wrist splint  Required Braces or Orthoses  Spinal: Lumbar Corset  Spinal Other: on when OOB  Right Upper Extremity Brace/Splint: Sling  Right Upper Extremity Brace/Splint: sling and swath for fx humerus and ulna and wrist splint  Position Activity Restriction  Other position/activity restrictions: lumbar corset on when OOB , NWB right UE, May come out of swathe, May come out of sling for gentle elbow ROM.     SUBJECTIVE: pleasant, co-operative    PAIN: 5/10: low back, right groin area    OBJECTIVE:  Bed Mobility:  Supine to Sit: Stand By Assistance, with head of bed raised, with increased time for completion  Sit to Supine: Minimal Assistance, with increased time for completion   Scooting: Modified Independent, scoot to edge of sitting surfaces    Transfers:  Sit to Stand: Bertram Troncoso Assistance, with increased time for completion, with verbal cues  Stand to [facility], with increased time for completion, with verbal cues    Ambulation:   CLOSE Stand By Assistance-> LT. Contact Guard Assistance, with verbal cues , with increased time for completion  Distance: 150' X 1,25' X 1  Surface: Level Tile  Device:Kvng-Walker  Gait Deviations:  Slow pace, occcasional vc for proper walker/feet placement/sequencing, occasional mild sway, pt. Able to correct    Exercise:  Patient was guided in 1 set(s) 15 reps of exercise to both lower extremities. Ankle pumps, Glut sets, Quad sets, Heelslides, Short arc quads, Hip abduction/adduction, Seated hip flexion, Long arc quads, Seated hamstring curls, Seated heel/toe raises and Seated isometric hip adduction. Exercises were completed for increased independence with functional mobility. Functional Outcome Measures: Not completed       ASSESSMENT:  Assessment: Patient progressing toward established goals. Activity Tolerance:  Patient tolerance of  treatment: good.       Equipment Recommendations:Equipment Needed: (continue to assess--may need hemiwalker)  Other: may need hemiwalker  Discharge Recommendations:  Discharge Recommendations: Continue to assess pending progress    Plan: Times per week: 6x  Current Treatment Recommendations: Strengthening, Functional Mobility Training, Balance Training, Transfer Training, Endurance Training, Gait Training, Patient/Caregiver Education & Training, Stair training, Equipment Evaluation, Education, & procurement, Safety Education & Training, Home Exercise Program    Patient Education  Patient Education: Home Exercise Program, Precautions/Restrictions, [employer] Mobility, Transfers, Gait, - Patient Requires Continued Education    Goals:  Patient goals : Get stronger and go home  Short term goals  Time Frame for Short term goals: 10 days  Short term goal 1: Patient will transfer supine <--> sit utilizing log roll

## 2019-06-25 NOTE — PROGRESS NOTES
2320: Rounded on patient after Early sense counted respirations of 38 with patient sleeping. Oxygen saturation reading of 89 to 90% obtained with patient on 1L/min via nasal canual. Oxygen increased from 1L to 1.5 L/min. Oxygen saturation of 95% obtained with respirations of 22 a minute. Patient made to use incentive spirometer. Asked patient if she would like to receive a PRN breathing treatment but patient refused and stated that she feels fine. 2336: Early sense reading Respirations of 35.

## 2019-06-25 NOTE — PROGRESS NOTES
[facility]  Recreational Therapy  Evaluation  Transitional Care Unit      Time Spent with Patient: 30 minutes    Date:  6/25/2019       Patient Name: Joaquina Weiss      MRN: [id number]       YOB: 1935 (80 y.o.)       Gender: female  Diagnosis: Closed compression fracture of body of L1 vertebra  Referring Practitioner: Leonel Wilson MD; Attending: Dr. Serafin Mathis    RESTRICTIONS/PRECAUTIONS:  Restrictions/Precautions: Weight Bearing, General Precautions, Fall Risk  Vision: Impaired  Hearing: Within functional limits    PAIN: 7-R UE-pain medication given and ice given while resting in bed     SUBJECTIVE:  Pt lives alone-she has a supportive brother, niece and neighbor     VISION:  Glasses    HEARING: Within Normal Limit    LEISURE INTERESTS:   Pt is independent at home with her cooking and cleaning-she drives-she mows her yard and enjoys going out to eat-she may have her brother bring in her lap top from home to occupy her mind in her free time-she had to have her dog put down in November and states she is going to get another dog sometime soon-would like our [facility] pet therapy dogs to visit during her stay-she is on 1 liter of 02 but does not have home 02-pleasant and social     BARRIERS TO LEISURE INTERESTS:    Decreased endurance and Upper extremity weakness           Patient Education  New Education Provided: Importance of Leisure, RT Plan of Care    Plan:  Continue to follow patient through this admission  Include patient in appropriate groups  See patient individually    Electronically signed by: ARAM Patten  Date: 6/25/2019

## 2019-06-26 PROCEDURE — 94761 N-INVAS EAR/PLS OXIMETRY MLT: CPT

## 2019-06-26 PROCEDURE — 97530 THERAPEUTIC ACTIVITIES: CPT

## 2019-06-26 PROCEDURE — 6370000000 HC RX 637 (ALT 250 FOR IP): Performed by: ORTHOPAEDIC SURGERY

## 2019-06-26 PROCEDURE — 2700000000 HC OXYGEN THERAPY PER DAY

## 2019-06-26 PROCEDURE — 94640 AIRWAY INHALATION TREATMENT: CPT

## 2019-06-26 PROCEDURE — 1290000000 HC SEMI PRIVATE OTHER R&B

## 2019-06-26 PROCEDURE — 92523 SPEECH SOUND LANG COMPREHEN: CPT

## 2019-06-26 PROCEDURE — 6370000000 HC RX 637 (ALT 250 FOR IP): Performed by: INTERNAL MEDICINE

## 2019-06-26 PROCEDURE — 97535 SELF CARE MNGMENT TRAINING: CPT

## 2019-06-26 PROCEDURE — 97110 THERAPEUTIC EXERCISES: CPT

## 2019-06-26 PROCEDURE — 97116 GAIT TRAINING THERAPY: CPT

## 2019-06-26 RX ADMIN — HYDROCODONE BITARTRATE AND ACETAMINOPHEN 2 TABLET: 5; 325 TABLET ORAL at 05:49

## 2019-06-26 RX ADMIN — MULTIPLE VITAMINS W/ MINERALS TAB 1 TABLET: TAB at 09:24

## 2019-06-26 RX ADMIN — BUSPIRONE HYDROCHLORIDE 10 MG: 10 TABLET ORAL at 09:24

## 2019-06-26 RX ADMIN — HYDROCODONE BITARTRATE AND ACETAMINOPHEN 2 TABLET: 5; 325 TABLET ORAL at 22:22

## 2019-06-26 RX ADMIN — ROPINIROLE HYDROCHLORIDE 1 MG: 1 TABLET, FILM COATED ORAL at 22:23

## 2019-06-26 RX ADMIN — BUSPIRONE HYDROCHLORIDE 10 MG: 10 TABLET ORAL at 22:23

## 2019-06-26 RX ADMIN — IPRATROPIUM BROMIDE AND ALBUTEROL SULFATE 1 AMPULE: .5; 3 SOLUTION RESPIRATORY (INHALATION) at 16:33

## 2019-06-26 RX ADMIN — PANTOPRAZOLE SODIUM 40 MG: 40 TABLET, DELAYED RELEASE ORAL at 16:43

## 2019-06-26 RX ADMIN — METOPROLOL TARTRATE 50 MG: 50 TABLET, FILM COATED ORAL at 09:24

## 2019-06-26 RX ADMIN — APIXABAN 5 MG: 5 TABLET, FILM COATED ORAL at 22:23

## 2019-06-26 RX ADMIN — IPRATROPIUM BROMIDE AND ALBUTEROL SULFATE 1 AMPULE: .5; 3 SOLUTION RESPIRATORY (INHALATION) at 06:01

## 2019-06-26 RX ADMIN — ATORVASTATIN CALCIUM 40 MG: 40 TABLET, FILM COATED ORAL at 22:24

## 2019-06-26 RX ADMIN — AMLODIPINE BESYLATE 5 MG: 5 TABLET ORAL at 09:24

## 2019-06-26 RX ADMIN — METOPROLOL TARTRATE 50 MG: 50 TABLET, FILM COATED ORAL at 22:23

## 2019-06-26 RX ADMIN — APIXABAN 5 MG: 5 TABLET, FILM COATED ORAL at 09:24

## 2019-06-26 RX ADMIN — IPRATROPIUM BROMIDE AND ALBUTEROL SULFATE 1 AMPULE: .5; 3 SOLUTION RESPIRATORY (INHALATION) at 13:10

## 2019-06-26 RX ADMIN — DICLOFENAC 4 G: 10 GEL TOPICAL at 08:05

## 2019-06-26 RX ADMIN — DOCUSATE SODIUM 100 MG: 100 CAPSULE, LIQUID FILLED ORAL at 09:24

## 2019-06-26 RX ADMIN — BUSPIRONE HYDROCHLORIDE 10 MG: 10 TABLET ORAL at 14:30

## 2019-06-26 RX ADMIN — HYDROCODONE BITARTRATE AND ACETAMINOPHEN 2 TABLET: 5; 325 TABLET ORAL at 12:42

## 2019-06-26 RX ADMIN — PANTOPRAZOLE SODIUM 40 MG: 40 TABLET, DELAYED RELEASE ORAL at 05:50

## 2019-06-26 RX ADMIN — FERROUS SULFATE TAB 325 MG (65 MG ELEMENTAL FE) 325 MG: 325 (65 FE) TAB at 16:43

## 2019-06-26 RX ADMIN — FERROUS SULFATE TAB 325 MG (65 MG ELEMENTAL FE) 325 MG: 325 (65 FE) TAB at 09:23

## 2019-06-26 ASSESSMENT — PAIN DESCRIPTION - FREQUENCY
FREQUENCY: CONTINUOUS
FREQUENCY: CONTINUOUS

## 2019-06-26 ASSESSMENT — PAIN DESCRIPTION - ORIENTATION: ORIENTATION: RIGHT

## 2019-06-26 ASSESSMENT — PAIN SCALES - GENERAL
PAINLEVEL_OUTOF10: 6
PAINLEVEL_OUTOF10: 8
PAINLEVEL_OUTOF10: 8
PAINLEVEL_OUTOF10: 7
PAINLEVEL_OUTOF10: 0
PAINLEVEL_OUTOF10: 8
PAINLEVEL_OUTOF10: 8

## 2019-06-26 ASSESSMENT — PAIN DESCRIPTION - LOCATION
LOCATION: SHOULDER
LOCATION: SHOULDER;ARM

## 2019-06-26 ASSESSMENT — PAIN DESCRIPTION - PROGRESSION
CLINICAL_PROGRESSION: NOT CHANGED
CLINICAL_PROGRESSION: NOT CHANGED

## 2019-06-26 ASSESSMENT — PAIN DESCRIPTION - DESCRIPTORS
DESCRIPTORS: ACHING
DESCRIPTORS: ACHING

## 2019-06-26 ASSESSMENT — PAIN DESCRIPTION - ONSET: ONSET: ON-GOING

## 2019-06-26 ASSESSMENT — PAIN DESCRIPTION - PAIN TYPE
TYPE: ACUTE PAIN
TYPE: ACUTE PAIN

## 2019-06-26 ASSESSMENT — PAIN - FUNCTIONAL ASSESSMENT: PAIN_FUNCTIONAL_ASSESSMENT: PREVENTS OR INTERFERES SOME ACTIVE ACTIVITIES AND ADLS

## 2019-06-26 NOTE — PROGRESS NOTES
[facility]  SPEECH THERAPY  [facility] SKILLED NURSING UNIT  Speech - Language - Cognitive Evaluation    SLP Individual Minutes  Time In: 1400  Time Out: 1430  Minutes: 30  Timed Code Treatment Minutes: 0 Minutes       Date: 2019  Patient Name: Kami Gilbert      MRN: [id number]    : 1935  (80 y.o.)  Gender: female   Referring Physician:  Mauricio Daniel MD  Diagnosis: Right Proximal Humerus Fracture   Secondary Diagnosis:  Cognitive deficits   Diet:  Regular with thin   History of Present Illness/Injury: Patient admitted to [facility] with above dx. See physician H&P for full report. Per chart review, \"The patient is a 80 y.o. female with multiple medical comorbidities who ortho was consulted for the above noted complaints. She is right hand dominant. Patient endorses a mechanical fall 2 days ago while walking out of her garage. She states she fell forward on an outstretched arm. She states she was able to work with the arm but had progressive pain and weakness which prompted her to get checked. She denies hitting her head and no LOC. She lives alone and is active. She is on Eliquis. X-rays reviewed and confirm the fractures. \" ST consulted to complete cognitive evaluation and determine goals and POC with focus on safety of discharge recommendations. Past Medical History:   Diagnosis Date    Anxiety     Arthritis     Blood circulation, collateral     Bronchiectasis (HCC)     CAD (coronary artery disease)     Chronic obstructive lung disease     Depression     Epigastric discomfort     Lower substernal and epigastric discomfort, etiology uncertain (probably not cardiac.)     Exertional dyspnea     Patient has considerable exertional dyspnea from her obstructive lung disease.  GERD (gastroesophageal reflux disease)     Hernia     History of blood transfusion     History of pneumonia     History of freqeunt episodes of pneumonia as a child.     History of positive PPD     thin liquids administered by RN during evaluation; no s/s of aspiration. Recommend continued current diet. IMPRESSIONS: Patient presents with mild-moderate cognitive deficits as evidence by impaired memory, thought organization, reasoning, problem solving, mathematical calculations and attention. All skilled necessary to be intact to successfully return to independent livinig, including driving. Mild expressive language deficits noted as evidence by anomia and semantic paraphasias throughout evaluation; patient with good awareness of errors. Receptive language presents to be [facility]. No dysarthria, dysphonia or dysphagia. Patient with poor insight overall re: cognitive deficits in relation to functional return to independent livinig. Patient would greatly benefit from skilled ST services to target barriers/deficts in order to successfully and safely return to the home and community setting upon discharge. REHAB POTENTIAL: [] Excellent [x] Good [] Fair  [] Poor    EDUCATION:   Learner: [x]Patient [] Significant other [] Son/Daughter [] Parent     [x] Other: Brother   Education: [x] Reviewed results and recommendations of this evaluation     [] Reviewed diet and strategies     [] Reviewed signs, symptoms and risk of aspiration     [] Demonstrated how to thick liquid appropriately. [x] Reviewed goals and Plan of Care     [] OTHER:   Method: [x] Discussion [x] Demonstration [] Hand-out     [] OTHER:   Evaluation of Education:     [x] Verbalizes understanding [x] Demonstrates with assistance     [] Demonstrates without assistance [x]Needs further instruction     [] No evidence of learning  [] Family not present    PLAN / TREATMENT RECOMMENDATIONS:  [x] Skilled SLP intervention on IP Rehab or TCU 30 minutes per day 5 days per week.   Specific interventions for next session may include: cognitive tasks       SHORT TERM GOALS:  Short-term Goals  Timeframe for Short-term Goals: 1 weeks  Goal 1: Patient will complete FUNCTIONAL recall, short term memory and working memory tasks with 75% accuracy provided mod cues to improve overall recall of newly learned information  Goal 2: Patient will complete FUNCTIONAL sequencing tasks with 75% accuracy provided mod cues to improve overall thought organization  Goal 3: Patient will complete FUNCTIONAL problem solving/reasoning task swith 75% accuracy provided mod cues to improve overall return to independent completion of ADL's and IADL's (finances, medications, grocery shopping, driving, ect)   Goal 4: Patient will complete divergent, convergent, responsive naming tasks with 75% accuracy provided mod cues to reduce frequency of anomia and improve expressive communication   Goal 5: Patient will complete sustained, divided and complex attention task with no more than 3 errors within 2 minutes in order to successfuly return to driving     LONG TERM GOALS:  Long-term Goals  Timeframe for Long-term Goals: 4 weeks  Goal 1: Patient will improve overall congitive functioning to an indepedent level of successfuly return to the home setting with and eventual return to driving upon discharge       KARLI De [address]

## 2019-06-26 NOTE — PROGRESS NOTES
Patient Name: Sun Stewart        MRN: [id number]    : 1935  (80 y.o.)  Gender: female   Principal Problem: <principal problem not specified>    Section D - Mood     Should Resident Mood Interview be Conducted? - Attempt to conduct interview with all residents   0. No (resident is rarely/never understood) à Skip to and complete -, Staff Assessment of Mood (PHQ 9-OV)  1. Yes à Continue to , Resident Mood Interview (PHQ-9) Enter Code    1   . Resident Mood Interview (PHQ-9)   Say to resident: Emily Miller the last 2 weeks, have you been bothered by any of the following problems?    If symptom is present, enter 1(yes) in column 1, Symptom Presence. Then move to column 2, Symptom Frequency, and indicate symptom frequency. 1. Symptom Presence                   2. Symptom                                                                  Frequency  0. No (enter 0 in column 2)          0. Never or 1 day          1. Yes (enter 0-3 in column 2)      1. 2-6 days           9. No Response                               2.  7-11 days                                                 3.  12-14 days   1. Symptom Presence 2. Symptom  Frequency        Enter Scores in boxes below   A. Little interest or pleasure in doing things 0 0   B. Feeling down, depressed, or hopeless 0 0   C. Trouble falling or staying asleep, or sleeping too much 1 2   D. Feeling tired or having little energy 1 2   E. Poor appetite or overeating 1 2   F. Feeling bad about yourself - or that you are a failure, or have let your family down 0 0   G. Trouble concentrating on things, such as reading the newspaper or watching television 0 0   H. Moving or speaking so slowly that other people have noticed. Or the opposite-being so fidgety or restless that s/he has been moving around a lot more than usual   0   0   I. Thoughts that you would be better off dead, or of hurting yourself in some way.  0 0              Patient Name: Perri Urbinachemo Ivan Thompson        MRN: [id number]    : 1935  (80 y.o.)  Gender: female   Principal Problem: <principal problem not specified>    Section J    Health Conditions    Should Pain Assessment Interview be Conducted? Attempt to conduct interview with all residents. If resident is comatose, skip to , Shortness of Breath (dyspnea)   Enter Code  1 0 - No à (resident is rarely/never understood) à Skip to [address] of Breath  1 - Yes à Continue to , Pain Presence   Pain Assessment Interview   Pain Presence   Enter Code  1 Ask resident: Deidre Anderson you had pain or hurting at any time in the last 5 days?   0 - No à Skip to , Shortness of Breath  1 - Yes  à Continue to , Pain Frequency  9 - Unable to answer à Skip to , Shortness of Breath    Pain Frequency   Enter Code          2 Ask resident: Juan Galdamez much of the time have you experienced pain or hurting over the last 5 days?   1 - Almost constantly  2 - Frequently  3 - Occasionally  4 - Rarely  9 - Unable to answer    Pain Effect on Function   Enter Code      1 Ask resident: Irving Smalls the last 5 days, has pain made it hard for you to sleep at night?   0 - No   1 - Yes   9 - Unable to answer    Enter Code      1 Ask resident: Irving Razacatrina the last 5 days, have you limited your day-to-day activities because of pain?   0 - No   1 - Yes   9 - Unable to answer     Pain Intensity   Enter Code      08 A. Numeric Rating Scale (00-10)  Ask resident: Kadeem Trivedi rate your worst pain over the last 5 days on a zero to ten scale, with zero being no pain and ten as the worst pain you can imagine.  (Show resident 00-10 pain scale)  Enter two-digit response. Enter 99 if unable to answer.

## 2019-06-26 NOTE — PROGRESS NOTES
Rt. Arm sling removed and skin assessed. No open or reddened areas noted, skin intact.  Brusing noted to upper arm

## 2019-06-27 PROCEDURE — 97530 THERAPEUTIC ACTIVITIES: CPT

## 2019-06-27 PROCEDURE — 97116 GAIT TRAINING THERAPY: CPT

## 2019-06-27 PROCEDURE — 1290000000 HC SEMI PRIVATE OTHER R&B

## 2019-06-27 PROCEDURE — 2700000000 HC OXYGEN THERAPY PER DAY

## 2019-06-27 PROCEDURE — 0220000000 HC SKILLED NURSING FACILITY

## 2019-06-27 PROCEDURE — 6370000000 HC RX 637 (ALT 250 FOR IP): Performed by: ORTHOPAEDIC SURGERY

## 2019-06-27 PROCEDURE — 94760 N-INVAS EAR/PLS OXIMETRY 1: CPT

## 2019-06-27 PROCEDURE — 94640 AIRWAY INHALATION TREATMENT: CPT

## 2019-06-27 PROCEDURE — 97535 SELF CARE MNGMENT TRAINING: CPT

## 2019-06-27 PROCEDURE — 6370000000 HC RX 637 (ALT 250 FOR IP): Performed by: INTERNAL MEDICINE

## 2019-06-27 PROCEDURE — 97110 THERAPEUTIC EXERCISES: CPT

## 2019-06-27 RX ADMIN — IPRATROPIUM BROMIDE AND ALBUTEROL SULFATE 1 AMPULE: .5; 3 SOLUTION RESPIRATORY (INHALATION) at 12:46

## 2019-06-27 RX ADMIN — MULTIPLE VITAMINS W/ MINERALS TAB 1 TABLET: TAB at 09:45

## 2019-06-27 RX ADMIN — PANTOPRAZOLE SODIUM 40 MG: 40 TABLET, DELAYED RELEASE ORAL at 05:22

## 2019-06-27 RX ADMIN — HYDROCODONE BITARTRATE AND ACETAMINOPHEN 2 TABLET: 5; 325 TABLET ORAL at 12:23

## 2019-06-27 RX ADMIN — IPRATROPIUM BROMIDE AND ALBUTEROL SULFATE 1 AMPULE: .5; 3 SOLUTION RESPIRATORY (INHALATION) at 09:25

## 2019-06-27 RX ADMIN — HYDROCODONE BITARTRATE AND ACETAMINOPHEN 2 TABLET: 5; 325 TABLET ORAL at 21:26

## 2019-06-27 RX ADMIN — BUSPIRONE HYDROCHLORIDE 10 MG: 10 TABLET ORAL at 09:45

## 2019-06-27 RX ADMIN — FERROUS SULFATE TAB 325 MG (65 MG ELEMENTAL FE) 325 MG: 325 (65 FE) TAB at 17:24

## 2019-06-27 RX ADMIN — POLYETHYLENE GLYCOL 3350 17 G: 17 POWDER, FOR SOLUTION ORAL at 23:09

## 2019-06-27 RX ADMIN — APIXABAN 5 MG: 5 TABLET, FILM COATED ORAL at 21:26

## 2019-06-27 RX ADMIN — ATORVASTATIN CALCIUM 40 MG: 40 TABLET, FILM COATED ORAL at 21:26

## 2019-06-27 RX ADMIN — PANTOPRAZOLE SODIUM 40 MG: 40 TABLET, DELAYED RELEASE ORAL at 16:00

## 2019-06-27 RX ADMIN — DOCUSATE SODIUM 100 MG: 100 CAPSULE, LIQUID FILLED ORAL at 21:25

## 2019-06-27 RX ADMIN — IPRATROPIUM BROMIDE AND ALBUTEROL SULFATE 1 AMPULE: .5; 3 SOLUTION RESPIRATORY (INHALATION) at 18:00

## 2019-06-27 RX ADMIN — BUSPIRONE HYDROCHLORIDE 10 MG: 10 TABLET ORAL at 21:26

## 2019-06-27 RX ADMIN — METOPROLOL TARTRATE 50 MG: 50 TABLET, FILM COATED ORAL at 09:45

## 2019-06-27 RX ADMIN — APIXABAN 5 MG: 5 TABLET, FILM COATED ORAL at 09:44

## 2019-06-27 RX ADMIN — BUSPIRONE HYDROCHLORIDE 10 MG: 10 TABLET ORAL at 14:00

## 2019-06-27 RX ADMIN — HYDROCODONE BITARTRATE AND ACETAMINOPHEN 2 TABLET: 5; 325 TABLET ORAL at 05:22

## 2019-06-27 RX ADMIN — DOCUSATE SODIUM 100 MG: 100 CAPSULE, LIQUID FILLED ORAL at 09:54

## 2019-06-27 RX ADMIN — FERROUS SULFATE TAB 325 MG (65 MG ELEMENTAL FE) 325 MG: 325 (65 FE) TAB at 09:45

## 2019-06-27 RX ADMIN — IPRATROPIUM BROMIDE AND ALBUTEROL SULFATE 1 AMPULE: .5; 3 SOLUTION RESPIRATORY (INHALATION) at 04:50

## 2019-06-27 RX ADMIN — AMLODIPINE BESYLATE 5 MG: 5 TABLET ORAL at 09:45

## 2019-06-27 RX ADMIN — ROPINIROLE HYDROCHLORIDE 1 MG: 1 TABLET, FILM COATED ORAL at 21:26

## 2019-06-27 ASSESSMENT — PAIN DESCRIPTION - ONSET
ONSET: ON-GOING
ONSET: ON-GOING

## 2019-06-27 ASSESSMENT — PAIN DESCRIPTION - DESCRIPTORS
DESCRIPTORS: ACHING
DESCRIPTORS: ACHING

## 2019-06-27 ASSESSMENT — PAIN DESCRIPTION - LOCATION
LOCATION: SHOULDER
LOCATION: SHOULDER

## 2019-06-27 ASSESSMENT — PAIN DESCRIPTION - FREQUENCY
FREQUENCY: CONTINUOUS
FREQUENCY: CONTINUOUS

## 2019-06-27 ASSESSMENT — PAIN DESCRIPTION - PAIN TYPE
TYPE: ACUTE PAIN
TYPE: ACUTE PAIN

## 2019-06-27 ASSESSMENT — PAIN SCALES - GENERAL
PAINLEVEL_OUTOF10: 8
PAINLEVEL_OUTOF10: 8
PAINLEVEL_OUTOF10: 7
PAINLEVEL_OUTOF10: 7

## 2019-06-27 ASSESSMENT — PAIN DESCRIPTION - ORIENTATION
ORIENTATION: RIGHT
ORIENTATION: RIGHT

## 2019-06-27 ASSESSMENT — PAIN DESCRIPTION - PROGRESSION
CLINICAL_PROGRESSION: GRADUALLY IMPROVING
CLINICAL_PROGRESSION: NOT CHANGED

## 2019-06-27 ASSESSMENT — PAIN - FUNCTIONAL ASSESSMENT: PAIN_FUNCTIONAL_ASSESSMENT: PREVENTS OR INTERFERES SOME ACTIVE ACTIVITIES AND ADLS

## 2019-06-27 NOTE — PROGRESS NOTES
Supervision for increased functional mobility. Short term goal 3: Pt to ambulate 100 feet with hemiwalker SBA for household ambulation. Short term goal 4: Pt to improve dynamic standing balance to fair+ to minimize fall risk. Long term goals  Time Frame for Long term goals : 2 weeks  Long term goal 1: Pt to ambulate >150 feet with hemiwalker with Supervision for household ambulation. Long term goal 2: Pt to ascend/descend 1 step without HR SBA for home entry. Long term goal 3: Pt to perform car transfer SBA to enable pt to get in/out of the car. Following session, patient left in safe position with all fall risk precautions in place.

## 2019-06-28 PROCEDURE — 6370000000 HC RX 637 (ALT 250 FOR IP): Performed by: INTERNAL MEDICINE

## 2019-06-28 PROCEDURE — 1290000000 HC SEMI PRIVATE OTHER R&B

## 2019-06-28 PROCEDURE — 97116 GAIT TRAINING THERAPY: CPT

## 2019-06-28 PROCEDURE — 6370000000 HC RX 637 (ALT 250 FOR IP): Performed by: ORTHOPAEDIC SURGERY

## 2019-06-28 PROCEDURE — 94640 AIRWAY INHALATION TREATMENT: CPT

## 2019-06-28 PROCEDURE — 97110 THERAPEUTIC EXERCISES: CPT

## 2019-06-28 PROCEDURE — 97530 THERAPEUTIC ACTIVITIES: CPT

## 2019-06-28 PROCEDURE — 97535 SELF CARE MNGMENT TRAINING: CPT

## 2019-06-28 RX ADMIN — METOPROLOL TARTRATE 50 MG: 50 TABLET, FILM COATED ORAL at 19:57

## 2019-06-28 RX ADMIN — ATORVASTATIN CALCIUM 40 MG: 40 TABLET, FILM COATED ORAL at 19:57

## 2019-06-28 RX ADMIN — MULTIPLE VITAMINS W/ MINERALS TAB 1 TABLET: TAB at 09:09

## 2019-06-28 RX ADMIN — BUSPIRONE HYDROCHLORIDE 10 MG: 10 TABLET ORAL at 09:11

## 2019-06-28 RX ADMIN — PANTOPRAZOLE SODIUM 40 MG: 40 TABLET, DELAYED RELEASE ORAL at 05:00

## 2019-06-28 RX ADMIN — DOCUSATE SODIUM 100 MG: 100 CAPSULE, LIQUID FILLED ORAL at 09:11

## 2019-06-28 RX ADMIN — DOCUSATE SODIUM 100 MG: 100 CAPSULE, LIQUID FILLED ORAL at 19:56

## 2019-06-28 RX ADMIN — APIXABAN 5 MG: 5 TABLET, FILM COATED ORAL at 09:11

## 2019-06-28 RX ADMIN — HYDROCODONE BITARTRATE AND ACETAMINOPHEN 1 TABLET: 5; 325 TABLET ORAL at 05:00

## 2019-06-28 RX ADMIN — APIXABAN 5 MG: 5 TABLET, FILM COATED ORAL at 19:57

## 2019-06-28 RX ADMIN — DICLOFENAC 4 G: 10 GEL TOPICAL at 13:39

## 2019-06-28 RX ADMIN — BUSPIRONE HYDROCHLORIDE 10 MG: 10 TABLET ORAL at 14:41

## 2019-06-28 RX ADMIN — IPRATROPIUM BROMIDE AND ALBUTEROL SULFATE 1 AMPULE: .5; 3 SOLUTION RESPIRATORY (INHALATION) at 10:12

## 2019-06-28 RX ADMIN — IPRATROPIUM BROMIDE AND ALBUTEROL SULFATE 1 AMPULE: .5; 3 SOLUTION RESPIRATORY (INHALATION) at 12:57

## 2019-06-28 RX ADMIN — HYDROCODONE BITARTRATE AND ACETAMINOPHEN 2 TABLET: 5; 325 TABLET ORAL at 13:36

## 2019-06-28 RX ADMIN — IPRATROPIUM BROMIDE AND ALBUTEROL SULFATE 1 AMPULE: .5; 3 SOLUTION RESPIRATORY (INHALATION) at 17:05

## 2019-06-28 RX ADMIN — HYDROCODONE BITARTRATE AND ACETAMINOPHEN 2 TABLET: 5; 325 TABLET ORAL at 19:56

## 2019-06-28 RX ADMIN — BUSPIRONE HYDROCHLORIDE 10 MG: 10 TABLET ORAL at 19:57

## 2019-06-28 RX ADMIN — AMLODIPINE BESYLATE 5 MG: 5 TABLET ORAL at 09:11

## 2019-06-28 RX ADMIN — DICLOFENAC 4 G: 10 GEL TOPICAL at 08:19

## 2019-06-28 RX ADMIN — PANTOPRAZOLE SODIUM 40 MG: 40 TABLET, DELAYED RELEASE ORAL at 17:42

## 2019-06-28 RX ADMIN — IPRATROPIUM BROMIDE AND ALBUTEROL SULFATE 1 AMPULE: .5; 3 SOLUTION RESPIRATORY (INHALATION) at 05:52

## 2019-06-28 RX ADMIN — ROPINIROLE HYDROCHLORIDE 1 MG: 1 TABLET, FILM COATED ORAL at 19:57

## 2019-06-28 RX ADMIN — METOPROLOL TARTRATE 50 MG: 50 TABLET, FILM COATED ORAL at 09:10

## 2019-06-28 ASSESSMENT — PAIN DESCRIPTION - PAIN TYPE: TYPE: ACUTE PAIN

## 2019-06-28 ASSESSMENT — PAIN SCALES - GENERAL
PAINLEVEL_OUTOF10: 7
PAINLEVEL_OUTOF10: 2
PAINLEVEL_OUTOF10: 7
PAINLEVEL_OUTOF10: 8
PAINLEVEL_OUTOF10: 0

## 2019-06-28 ASSESSMENT — PAIN DESCRIPTION - ONSET: ONSET: ON-GOING

## 2019-06-28 ASSESSMENT — PAIN DESCRIPTION - ORIENTATION: ORIENTATION: RIGHT

## 2019-06-28 ASSESSMENT — PAIN DESCRIPTION - LOCATION: LOCATION: SHOULDER

## 2019-06-28 ASSESSMENT — PAIN DESCRIPTION - DESCRIPTORS: DESCRIPTORS: ACHING

## 2019-06-28 ASSESSMENT — PAIN DESCRIPTION - FREQUENCY: FREQUENCY: CONTINUOUS

## 2019-06-28 ASSESSMENT — PAIN DESCRIPTION - PROGRESSION: CLINICAL_PROGRESSION: NOT CHANGED

## 2019-06-28 NOTE — PROGRESS NOTES
(RUE)    Goals  Short term goals  Time Frame for Short term goals: 1 week  Short term goal 1: Pt to complete functional mobility to/from BR with SBA with 0 cues for safety for inc indep with ADL mobility. Short term goal 2: Pt will complete dynamic standing task x 4 mins with no UE support at SBA for inc indep with grooming tasks  Short term goal 3: Pt to demonstrate adaptive dressing techniques with Elizabeth to increase indep with UB ADLs. Short term goal 4: Pt will complete various t/fs at SBA to increase indep with toileting routine. Short term goal 5: Pt will complete toileting routine with Elizabeth to increase indep with clothing management. Long term goals  Time Frame for Long term goals : 4 weeks  Long term goal 1: Pt will complete ADL routine including shower t/f with S to increase indep with self care. Long term goal 2: Pt will complete light homemaking task with S to increase indep with light cooking and cleaning at PLOF. Following session, patient left in safe position with all fall risk precautions in place.

## 2019-06-28 NOTE — PROGRESS NOTES
[address] [address]  INPATIENT PHYSICAL THERAPY  DAILY NOTE  - [place] SKILLED NURSING UNIT - 8E-67/067-A    Time In: 1100  Time Out: 1202  Timed Code Treatment Minutes: 62 Minutes  Minutes: 62          Date: 2019  Patient Name: Moi Bliss,  Gender:  female        MRN: [id number]  : 1935  (80 y.o.)  Referral Date : 19  Referring Practitioner: Cait Le MD (referring); Courtney Carroll MD (attending)  Diagnosis: Closed compression fx of body of L1 vertebrae  Additional Pertinent Hx: Pt admitted following a fall 2 days ago in which she fell onto a out stretched Right UE causing humerus and ulna fx and a L1 vert fx . Transferred to [facility] . Prior Level of Function:  Lives With: Alone  Type of Home: House  Home Layout: Two level  Home Access: Stairs to enter without rails  Entrance Stairs - Number of Steps: 1  Home Equipment: Wheelchair-manual, Rolling walker    Restrictions/Precautions:  Restrictions/Precautions: Weight Bearing, General Precautions, Fall Risk  Right Upper Extremity Weight Bearing: Non Weight Bearing  Right Upper Extremity Brace/Splint: sling and swath for fx humerus and ulna and wrist splint  Required Braces or Orthoses  Spinal: Lumbar Corset  Spinal Other: on when OOB  Right Upper Extremity Brace/Splint: Sling  Right Upper Extremity Brace/Splint: sling and swath for fx humerus and ulna and wrist splint  Position Activity Restriction  Other position/activity restrictions: lumbar corset on when OOB , NWB right UE, May come out of swathe, May come out of sling for gentle elbow ROM.     SUBJECTIVE: PLEASANT, CO-OPERATIVE    PAIN:  No # given for right groin pain      OBJECTIVE:    Bed Mobility:  Supine to Sit: Modified Independent  Sit to Supine: Independent, Modified Independent   Scooting: Modified Independent    Transfers:  Sit to Stand: Stand By Assistance, with verbal cues  Stand to Sit:Stand By Assistance, with verbal cues  toilet transfer sit<-> stand: Supervision,

## 2019-06-29 PROCEDURE — 6370000000 HC RX 637 (ALT 250 FOR IP): Performed by: INTERNAL MEDICINE

## 2019-06-29 PROCEDURE — 94640 AIRWAY INHALATION TREATMENT: CPT

## 2019-06-29 PROCEDURE — 6370000000 HC RX 637 (ALT 250 FOR IP): Performed by: ORTHOPAEDIC SURGERY

## 2019-06-29 PROCEDURE — 1290000000 HC SEMI PRIVATE OTHER R&B

## 2019-06-29 RX ORDER — IPRATROPIUM BROMIDE AND ALBUTEROL SULFATE 2.5; .5 MG/3ML; MG/3ML
1 SOLUTION RESPIRATORY (INHALATION) PRN
Status: DISCONTINUED | OUTPATIENT
Start: 2019-06-29 | End: 2019-07-03 | Stop reason: HOSPADM

## 2019-06-29 RX ADMIN — BUSPIRONE HYDROCHLORIDE 10 MG: 10 TABLET ORAL at 14:17

## 2019-06-29 RX ADMIN — APIXABAN 5 MG: 5 TABLET, FILM COATED ORAL at 23:28

## 2019-06-29 RX ADMIN — DICLOFENAC 4 G: 10 GEL TOPICAL at 17:13

## 2019-06-29 RX ADMIN — METOPROLOL TARTRATE 50 MG: 50 TABLET, FILM COATED ORAL at 23:29

## 2019-06-29 RX ADMIN — BUSPIRONE HYDROCHLORIDE 10 MG: 10 TABLET ORAL at 23:29

## 2019-06-29 RX ADMIN — DOCUSATE SODIUM 100 MG: 100 CAPSULE, LIQUID FILLED ORAL at 09:44

## 2019-06-29 RX ADMIN — MULTIPLE VITAMINS W/ MINERALS TAB 1 TABLET: TAB at 09:45

## 2019-06-29 RX ADMIN — ROPINIROLE HYDROCHLORIDE 1 MG: 1 TABLET, FILM COATED ORAL at 23:29

## 2019-06-29 RX ADMIN — HYDROCODONE BITARTRATE AND ACETAMINOPHEN 2 TABLET: 5; 325 TABLET ORAL at 06:18

## 2019-06-29 RX ADMIN — BUSPIRONE HYDROCHLORIDE 10 MG: 10 TABLET ORAL at 09:45

## 2019-06-29 RX ADMIN — METOPROLOL TARTRATE 50 MG: 50 TABLET, FILM COATED ORAL at 09:45

## 2019-06-29 RX ADMIN — DICLOFENAC 4 G: 10 GEL TOPICAL at 06:22

## 2019-06-29 RX ADMIN — IPRATROPIUM BROMIDE AND ALBUTEROL SULFATE 1 AMPULE: .5; 3 SOLUTION RESPIRATORY (INHALATION) at 06:04

## 2019-06-29 RX ADMIN — DICLOFENAC 4 G: 10 GEL TOPICAL at 09:46

## 2019-06-29 RX ADMIN — AMLODIPINE BESYLATE 5 MG: 5 TABLET ORAL at 09:45

## 2019-06-29 RX ADMIN — POLYETHYLENE GLYCOL 3350 17 G: 17 POWDER, FOR SOLUTION ORAL at 09:44

## 2019-06-29 RX ADMIN — APIXABAN 5 MG: 5 TABLET, FILM COATED ORAL at 09:45

## 2019-06-29 RX ADMIN — PANTOPRAZOLE SODIUM 40 MG: 40 TABLET, DELAYED RELEASE ORAL at 04:49

## 2019-06-29 RX ADMIN — HYDROCODONE BITARTRATE AND ACETAMINOPHEN 2 TABLET: 5; 325 TABLET ORAL at 17:14

## 2019-06-29 RX ADMIN — IPRATROPIUM BROMIDE AND ALBUTEROL SULFATE 1 AMPULE: .5; 3 SOLUTION RESPIRATORY (INHALATION) at 09:28

## 2019-06-29 RX ADMIN — HYDROCODONE BITARTRATE AND ACETAMINOPHEN 1 TABLET: 5; 325 TABLET ORAL at 23:29

## 2019-06-29 RX ADMIN — ATORVASTATIN CALCIUM 40 MG: 40 TABLET, FILM COATED ORAL at 23:29

## 2019-06-29 RX ADMIN — PANTOPRAZOLE SODIUM 40 MG: 40 TABLET, DELAYED RELEASE ORAL at 17:13

## 2019-06-29 RX ADMIN — DOCUSATE SODIUM 100 MG: 100 CAPSULE, LIQUID FILLED ORAL at 23:29

## 2019-06-29 ASSESSMENT — PAIN DESCRIPTION - ONSET
ONSET: ON-GOING
ONSET: ON-GOING

## 2019-06-29 ASSESSMENT — PAIN SCALES - GENERAL
PAINLEVEL_OUTOF10: 7
PAINLEVEL_OUTOF10: 7
PAINLEVEL_OUTOF10: 0
PAINLEVEL_OUTOF10: 0
PAINLEVEL_OUTOF10: 4
PAINLEVEL_OUTOF10: 4
PAINLEVEL_OUTOF10: 0

## 2019-06-29 ASSESSMENT — PAIN DESCRIPTION - FREQUENCY
FREQUENCY: CONTINUOUS
FREQUENCY: CONTINUOUS

## 2019-06-29 ASSESSMENT — PAIN DESCRIPTION - DESCRIPTORS
DESCRIPTORS: ACHING
DESCRIPTORS: ACHING

## 2019-06-29 ASSESSMENT — PAIN DESCRIPTION - ORIENTATION
ORIENTATION: RIGHT
ORIENTATION: RIGHT

## 2019-06-29 ASSESSMENT — PAIN DESCRIPTION - PAIN TYPE
TYPE: ACUTE PAIN
TYPE: ACUTE PAIN

## 2019-06-29 ASSESSMENT — PAIN DESCRIPTION - PROGRESSION
CLINICAL_PROGRESSION: NOT CHANGED
CLINICAL_PROGRESSION: NOT CHANGED

## 2019-06-29 ASSESSMENT — PAIN DESCRIPTION - LOCATION
LOCATION: SHOULDER
LOCATION: SHOULDER

## 2019-06-30 PROCEDURE — 97535 SELF CARE MNGMENT TRAINING: CPT

## 2019-06-30 PROCEDURE — 6370000000 HC RX 637 (ALT 250 FOR IP): Performed by: INTERNAL MEDICINE

## 2019-06-30 PROCEDURE — 1290000000 HC SEMI PRIVATE OTHER R&B

## 2019-06-30 PROCEDURE — 97116 GAIT TRAINING THERAPY: CPT

## 2019-06-30 PROCEDURE — 6370000000 HC RX 637 (ALT 250 FOR IP): Performed by: ORTHOPAEDIC SURGERY

## 2019-06-30 PROCEDURE — 97110 THERAPEUTIC EXERCISES: CPT

## 2019-06-30 RX ADMIN — PANTOPRAZOLE SODIUM 40 MG: 40 TABLET, DELAYED RELEASE ORAL at 05:09

## 2019-06-30 RX ADMIN — ATORVASTATIN CALCIUM 40 MG: 40 TABLET, FILM COATED ORAL at 21:26

## 2019-06-30 RX ADMIN — METOPROLOL TARTRATE 50 MG: 50 TABLET, FILM COATED ORAL at 21:26

## 2019-06-30 RX ADMIN — METOPROLOL TARTRATE 50 MG: 50 TABLET, FILM COATED ORAL at 09:54

## 2019-06-30 RX ADMIN — BUSPIRONE HYDROCHLORIDE 10 MG: 10 TABLET ORAL at 13:27

## 2019-06-30 RX ADMIN — BUSPIRONE HYDROCHLORIDE 10 MG: 10 TABLET ORAL at 09:54

## 2019-06-30 RX ADMIN — BUSPIRONE HYDROCHLORIDE 10 MG: 10 TABLET ORAL at 21:26

## 2019-06-30 RX ADMIN — APIXABAN 5 MG: 5 TABLET, FILM COATED ORAL at 21:26

## 2019-06-30 RX ADMIN — AMLODIPINE BESYLATE 5 MG: 5 TABLET ORAL at 09:54

## 2019-06-30 RX ADMIN — POLYETHYLENE GLYCOL 3350 17 G: 17 POWDER, FOR SOLUTION ORAL at 09:54

## 2019-06-30 RX ADMIN — APIXABAN 5 MG: 5 TABLET, FILM COATED ORAL at 09:54

## 2019-06-30 RX ADMIN — PANTOPRAZOLE SODIUM 40 MG: 40 TABLET, DELAYED RELEASE ORAL at 17:10

## 2019-06-30 RX ADMIN — HYDROCODONE BITARTRATE AND ACETAMINOPHEN 2 TABLET: 5; 325 TABLET ORAL at 05:08

## 2019-06-30 RX ADMIN — MULTIPLE VITAMINS W/ MINERALS TAB 1 TABLET: TAB at 09:54

## 2019-06-30 RX ADMIN — DOCUSATE SODIUM 100 MG: 100 CAPSULE, LIQUID FILLED ORAL at 09:54

## 2019-06-30 RX ADMIN — ROPINIROLE HYDROCHLORIDE 1 MG: 1 TABLET, FILM COATED ORAL at 21:26

## 2019-06-30 RX ADMIN — HYDROCODONE BITARTRATE AND ACETAMINOPHEN 2 TABLET: 5; 325 TABLET ORAL at 21:26

## 2019-06-30 ASSESSMENT — PAIN DESCRIPTION - PAIN TYPE
TYPE_2: ACUTE PAIN
TYPE: ACUTE PAIN

## 2019-06-30 ASSESSMENT — PAIN DESCRIPTION - PROGRESSION
CLINICAL_PROGRESSION: GRADUALLY IMPROVING
CLINICAL_PROGRESSION: GRADUALLY WORSENING
CLINICAL_PROGRESSION: GRADUALLY IMPROVING

## 2019-06-30 ASSESSMENT — PAIN DESCRIPTION - LOCATION
LOCATION: SHOULDER
LOCATION_2: BACK

## 2019-06-30 ASSESSMENT — PAIN DESCRIPTION - ORIENTATION
ORIENTATION: RIGHT
ORIENTATION_2: LOWER
ORIENTATION: RIGHT

## 2019-06-30 ASSESSMENT — PAIN SCALES - GENERAL
PAINLEVEL_OUTOF10: 7

## 2019-06-30 ASSESSMENT — PAIN DESCRIPTION - DESCRIPTORS
DESCRIPTORS_2: ACHING;CONSTANT
DESCRIPTORS: ACHING

## 2019-06-30 ASSESSMENT — PAIN DESCRIPTION - FREQUENCY
FREQUENCY: CONTINUOUS

## 2019-06-30 ASSESSMENT — PAIN - FUNCTIONAL ASSESSMENT
PAIN_FUNCTIONAL_ASSESSMENT: PREVENTS OR INTERFERES SOME ACTIVE ACTIVITIES AND ADLS

## 2019-06-30 ASSESSMENT — PAIN DESCRIPTION - ONSET
ONSET: ON-GOING

## 2019-06-30 ASSESSMENT — PAIN DESCRIPTION - INTENSITY: RATING_2: 8

## 2019-07-01 LAB
ANION GAP SERPL CALCULATED.3IONS-SCNC: 9 MEQ/L (ref 8–16)
BUN BLDV-MCNC: 14 MG/DL (ref 7–22)
CALCIUM SERPL-MCNC: 9.4 MG/DL (ref 8.5–10.5)
CHLORIDE BLD-SCNC: 102 MEQ/L (ref 98–111)
CO2: 27 MEQ/L (ref 23–33)
CREAT SERPL-MCNC: 0.6 MG/DL (ref 0.4–1.2)
ERYTHROCYTE [DISTWIDTH] IN BLOOD BY AUTOMATED COUNT: 15.4 % (ref 11.5–14.5)
ERYTHROCYTE [DISTWIDTH] IN BLOOD BY AUTOMATED COUNT: 54.4 FL (ref 35–45)
GFR SERPL CREATININE-BSD FRML MDRD: > 90 ML/MIN/1.73M2
GLUCOSE BLD-MCNC: 99 MG/DL (ref 70–108)
HCT VFR BLD CALC: 31.3 % (ref 37–47)
HEMOGLOBIN: 9.5 GM/DL (ref 12–16)
MCH RBC QN AUTO: 29.6 PG (ref 26–33)
MCHC RBC AUTO-ENTMCNC: 30.4 GM/DL (ref 32.2–35.5)
MCV RBC AUTO: 97.5 FL (ref 81–99)
PLATELET # BLD: 399 THOU/MM3 (ref 130–400)
PMV BLD AUTO: 8.7 FL (ref 9.4–12.4)
POTASSIUM SERPL-SCNC: 4.4 MEQ/L (ref 3.5–5.2)
RBC # BLD: 3.21 MILL/MM3 (ref 4.2–5.4)
SODIUM BLD-SCNC: 138 MEQ/L (ref 135–145)
WBC # BLD: 7.7 THOU/MM3 (ref 4.8–10.8)

## 2019-07-01 PROCEDURE — 6370000000 HC RX 637 (ALT 250 FOR IP): Performed by: ORTHOPAEDIC SURGERY

## 2019-07-01 PROCEDURE — 97116 GAIT TRAINING THERAPY: CPT

## 2019-07-01 PROCEDURE — 1290000000 HC SEMI PRIVATE OTHER R&B

## 2019-07-01 PROCEDURE — 97535 SELF CARE MNGMENT TRAINING: CPT

## 2019-07-01 PROCEDURE — 80048 BASIC METABOLIC PNL TOTAL CA: CPT

## 2019-07-01 PROCEDURE — 36415 COLL VENOUS BLD VENIPUNCTURE: CPT

## 2019-07-01 PROCEDURE — 97530 THERAPEUTIC ACTIVITIES: CPT

## 2019-07-01 PROCEDURE — 6370000000 HC RX 637 (ALT 250 FOR IP): Performed by: INTERNAL MEDICINE

## 2019-07-01 PROCEDURE — 97110 THERAPEUTIC EXERCISES: CPT

## 2019-07-01 PROCEDURE — 85027 COMPLETE CBC AUTOMATED: CPT

## 2019-07-01 RX ADMIN — BUSPIRONE HYDROCHLORIDE 10 MG: 10 TABLET ORAL at 09:51

## 2019-07-01 RX ADMIN — DOCUSATE SODIUM 100 MG: 100 CAPSULE, LIQUID FILLED ORAL at 09:51

## 2019-07-01 RX ADMIN — METOPROLOL TARTRATE 50 MG: 50 TABLET, FILM COATED ORAL at 09:51

## 2019-07-01 RX ADMIN — HYDROCODONE BITARTRATE AND ACETAMINOPHEN 2 TABLET: 5; 325 TABLET ORAL at 05:40

## 2019-07-01 RX ADMIN — BUSPIRONE HYDROCHLORIDE 10 MG: 10 TABLET ORAL at 13:23

## 2019-07-01 RX ADMIN — PANTOPRAZOLE SODIUM 40 MG: 40 TABLET, DELAYED RELEASE ORAL at 05:40

## 2019-07-01 RX ADMIN — MULTIPLE VITAMINS W/ MINERALS TAB 1 TABLET: TAB at 09:51

## 2019-07-01 RX ADMIN — ATORVASTATIN CALCIUM 40 MG: 40 TABLET, FILM COATED ORAL at 20:10

## 2019-07-01 RX ADMIN — HYDROCODONE BITARTRATE AND ACETAMINOPHEN 2 TABLET: 5; 325 TABLET ORAL at 23:40

## 2019-07-01 RX ADMIN — APIXABAN 5 MG: 5 TABLET, FILM COATED ORAL at 20:10

## 2019-07-01 RX ADMIN — HYDROCODONE BITARTRATE AND ACETAMINOPHEN 2 TABLET: 5; 325 TABLET ORAL at 15:42

## 2019-07-01 RX ADMIN — PANTOPRAZOLE SODIUM 40 MG: 40 TABLET, DELAYED RELEASE ORAL at 17:14

## 2019-07-01 RX ADMIN — BUSPIRONE HYDROCHLORIDE 10 MG: 10 TABLET ORAL at 20:11

## 2019-07-01 RX ADMIN — METOPROLOL TARTRATE 50 MG: 50 TABLET, FILM COATED ORAL at 20:10

## 2019-07-01 RX ADMIN — ROPINIROLE HYDROCHLORIDE 1 MG: 1 TABLET, FILM COATED ORAL at 20:10

## 2019-07-01 RX ADMIN — APIXABAN 5 MG: 5 TABLET, FILM COATED ORAL at 09:51

## 2019-07-01 RX ADMIN — AMLODIPINE BESYLATE 5 MG: 5 TABLET ORAL at 09:52

## 2019-07-01 ASSESSMENT — PAIN DESCRIPTION - ORIENTATION: ORIENTATION: RIGHT

## 2019-07-01 ASSESSMENT — PAIN DESCRIPTION - DESCRIPTORS: DESCRIPTORS: ACHING

## 2019-07-01 ASSESSMENT — PAIN DESCRIPTION - FREQUENCY: FREQUENCY: CONTINUOUS

## 2019-07-01 ASSESSMENT — PAIN SCALES - GENERAL
PAINLEVEL_OUTOF10: 7
PAINLEVEL_OUTOF10: 7
PAINLEVEL_OUTOF10: 8
PAINLEVEL_OUTOF10: 0
PAINLEVEL_OUTOF10: 7
PAINLEVEL_OUTOF10: 8

## 2019-07-01 ASSESSMENT — PAIN DESCRIPTION - PAIN TYPE: TYPE: ACUTE PAIN

## 2019-07-01 ASSESSMENT — PAIN DESCRIPTION - LOCATION: LOCATION: ARM;SHOULDER

## 2019-07-01 NOTE — PATIENT CARE CONFERENCE
[address] [address]  Transitional Care Unit  Team Conference Note    Patient Name: Collin Vargas   MRN: [id number]    : 1935  (80 y.o.)  Gender: female   Referring Practitioner: Jennifer Strange MD (referring); Nicolas Estrella MD (attending)  Diagnosis: Closed compression fx of body of L1 vertebrae    Team Conference Date: 2019   Admission Date:     4:72 PM  Re-Certification Date: 280    :  Tentative Discharge Plan and current psychosocial issues:Patient has plans to return home once discharged from [address] [place] [address]. Patient was independent with all care prior to hospitalization. Patient has extended family and 2 brothers that are helpful and assist as needed. Patient has no medical equipment at home and may benefit from DME and possibly home care if their is a continued need for ongoing therapy once discharged. Nursing:     Weight:  Weight: has been stable     Wounds/Incisions/Ulcers:  No skin/wound issues identified  Bowel: continent  Bladder:continent     Pain: Patient's pain is currently controlled with volterin gel, norco    Education Provided:  Diabetic:  No  Peg Tube:No    Millan Care:  Education:NA  Removal Necessary:  NA  Supplies Needed: NA     Anticoagulant Use:  Patient on eliquis for anticoagulation, which is being managed by Primary Care Physician    Antibiotic Use:  Patient not on antibiotics    Psychotropic Medication Use:  Patient on buspar, ambien     Oxygen Use: Yes 1L    Home Medications Reconciled: N/A    Physical Therapy:   Bed Mobility  Scooting: Stand by assistance  Transfers  Sit to Stand: Contact guard assistance(from BS chair and w/c)  Stand to sit: Contact guard assistance  Ambulation 1  Surface: level tile  Device: Hemiwalker  Other Apparatus: (right UE sling and abd binder)  Assistance: Contact guard assistance  Quality of Gait: Pt amb with decreased speed and augusta, discontinuous steps, decreased step length.   Distance: 60 feet  PT Equipment
above). Pt seen on rounds with LSW, to review the results with patient and family. Discussed length of stay as above. Pt's team conference attended (see above.)  Pt seen on rounds with LSW, to review the results with patient and family. Discussed length of stay as above.     Electronically signed by Noam Fajardo MD on 7/2/2019 at 9:09 AM

## 2019-07-01 NOTE — PROGRESS NOTES
[facility]  INPATIENT PHYSICAL THERAPY  Progress Note  [address] [place] SKILLED NURSING UNIT - 8E-67/067-A    Time In: 6349  Time Out: 1100  Timed Code Treatment Minutes: 39 Minutes  Minutes: 45          Date: 2019  Patient Name: Russell Ruby,  Gender:  female        MRN: [id number]  : 1935  (80 y.o.)  Referral Date : 19  Referring Practitioner: Lakeisha Edwards MD (referring); Richard Taylor MD (attending)  Diagnosis: Closed compression fx of body of L1 vertebrae  Additional Pertinent Hx: Pt admitted following a fall 2 days ago in which she fell onto a out stretched Right UE causing humerus and ulna fx and a L1 vert fx . Transferred to [facility] . Prior Level of Function:  Lives With: Alone  Type of Home: House  Home Layout: Two level  Home Access: Stairs to enter without rails  Entrance Stairs - Number of Steps: 1  Home Equipment: Wheelchair-manual, Rolling walker    Restrictions/Precautions:  Restrictions/Precautions: Weight Bearing, General Precautions, Fall Risk  Right Upper Extremity Weight Bearing: Non Weight Bearing  Right Upper Extremity Brace/Splint: sling and swath for fx humerus and ulna and wrist splint  Required Braces or Orthoses  Spinal: Lumbar Corset  Spinal Other: on when OOB  Right Upper Extremity Brace/Splint: Sling  Right Upper Extremity Brace/Splint: sling and swath for fx humerus and ulna and wrist splint  Position Activity Restriction  Other position/activity restrictions: lumbar corset on when OOB , NWB right UE, May come out of swathe, May come out of sling for gentle elbow ROM. SUBJECTIVE: Pt resting in bed. Pleasant and cooperative. PAIN: not rated. Pt c/o shoulder soreness. Denied need for pain meds. Ice pack on shoulder at end of session/OBJECTIVE:  Bed Mobility:  Supine to Sit: Modified Independent. Head of bed elevated approx 25 degrees, no rail assist.  Pt able to elevate bed at home.   Sit to Supine: Modified Independent     Transfers:  Sit to Stand: <--> sit utilizing log roll technique mod I, in order to get into/out of bed. MET, continue  Short term goal 2: Pt to transfer sit <--> stand Supervision for increased functional mobility. NOT MET, continue  Short term goal 3: Pt to ambulate 100 feet with hemiwalker SBA for household ambulation. MET see LTG  Short term goal 4: Pt to improve dynamic standing balance to fair+ to minimize fall risk. MET see revised goal  Revised Short-Term Goals:    Short term goals  Time Frame for Short term goals: 10 days  Short term goal 1: Patient will transfer supine <--> sit utilizing log roll technique mod I, in order to get into/out of bed. Short term goal 2: Pt to transfer sit <--> stand, Mod I, to get up to walk. Short term goal 3: Pt to ambulate 100 feet with hemiwalker SBA for household ambulation. MET see LTG  Short term goal 4: Pt to ascend/descend 6\" step with hemiwalker, Mod I, for home entry. Short term goal 5: P       Long term goals  Time Frame for Long term goals : 2 weeks  Long term goal 1: Pt to ambulate >150 feet with hemiwalker with Supervision for household ambulation. NOT MET, continue  Long term goal 2: Pt to ascend/descend 1 step without HR SBA for home entry. NOT MET, continue  Long term goal 3: Pt to perform car transfer SBA to enable pt to get in/out of the car. MET see revised goal  Revised Long-Term Goals  Long term goals  Time Frame for Long term goals : 2 weeks  Long term goal 1: Pt to walk with hemiwalker, >= 50 ft Mod I, >= 150 ft, SBA for home and community mobility. Long term goal 2: Pt to ascend/descend 1 step with hemiwalker, Mod I, for home entry. Long term goal 3: Pt to perform car transfer Mod I,  to enable pt to get in/out of the car.

## 2019-07-01 NOTE — CARE COORDINATION
Cha Hernandez requires a karon walker due to impaired ambulation and for increased stability in order to participate in or complete daily living tasks of: ambulating. The patient is able to safely use the hemiwalker and the functional mobility deficit can be sufficiently resolved.

## 2019-07-01 NOTE — PROGRESS NOTES
[address] [address]  Recreational Therapy  Daily Note  - [place] SKILLED NURSING UNIT    Time Spent with Patient: 25 minutes    Date:  7/1/2019       Patient Name: Ginger Diamond      MRN: [id number]      YOB: 1935 (80 y.o.)       Gender: female  Diagnosis: Closed compression fracture of body of L1 vertebra  Referring Practitioner: Marilyn Thomas MD; Attending: Dr. Ousmane Patel    RESTRICTIONS/PRECAUTIONS:  Restrictions/Precautions: Weight Bearing, General Precautions, Fall Risk  Vision: Impaired  Hearing: Within functional limits    PAIN: 0-no c/o pain     SUBJECTIVE:  I had a good weekend for the most part     OBJECTIVE:  Pt resting in bed after her P.T.-states she had a good weekend for the most part-slept much better last night than the previous one-she talked about her family and living at [facility]-is upbeat and social-         Patient Education  New Education Provided: Importance of Leisure,     Electronically signed by: ARAM Chao  Date: 7/1/2019

## 2019-07-01 NOTE — PROGRESS NOTES
SBA with 0 cues for safety for inc indep with ADL mobility. MET, REVISE    Short term goal 2: Pt will complete dynamic standing task x 4 mins with no UE support at SBA for inc indep with grooming tasks MET, REVISE    Short term goal 3: Pt to demonstrate adaptive dressing techniques with Elizabeth to increase indep with UB ADLs. MET, SEE LTG    Short term goal 4: Pt will complete various t/fs at SBA to increase indep with toileting routine. MET, REVISE    Short term goal 5: Pt will complete toileting routine with Elizabeth to increase indep with clothing management. MET, SEE LTG    Long term goals  Time Frame for Long term goals : 4 weeks  Long term goal 1: Pt will complete ADL routine including shower t/f with S to increase indep with self care. NOT MET, CONTINUE    Long term goal 2: Pt will complete light homemaking task with S to increase indep with light cooking and cleaning at PLOF. NOT MET, CONTINUE    Revised Short-Term Goals  Short term goals  Time Frame for Short term goals: 1 week  Short term goal 1: Pt to complete functional mobility to/from BR with S with 0 cues for safety for inc indep with ADL mobility. Short term goal 2: Pt will complete dynamic standing task x 6 mins with no UE support at S for inc indep with grooming tasks  Short term goal 3: Pt to demonstrate adaptive dressing techniques with Elizabeth to increase indep with UB ADLs. MET, SEE ltg  Short term goal 4: Pt will complete various t/fs at S to increase indep with toileting routine. Short term goal 5: Pt will complete toileting routine with Elizabeth to increase indep with clothing management. MET, SEE LTG  Long term goals  Time Frame for Long term goals : 4 weeks  Long term goal 1: Pt will complete ADL routine including shower t/f with S to increase indep with self care. Long term goal 2: Pt will complete light homemaking task with S to increase indep with light cooking and cleaning at PLOF.     Following session, patient left in safe position with all fall

## 2019-07-01 NOTE — PROGRESS NOTES
but cant do or no choice  6 - No response or non-responsive Enter Codes in Boxes    1 A. How important is it to you to have books, newspapers, and magazines to read?    4 B. How important is it to you to listen to music you like?    4 C. How important is it to you to be around animals such as pets?     2 D. How important is it to you to keep up with the news?    1 E. How important is it to you to do things with groups of people?     1 F. How important is it to you to do your favorite activities?     1 G. How important is it to you to go outside to get fresh air when the weather is good?     1 H. How important is it to you to participate in [employer] services or practices? .  Daily and Activity Preferences Primary Respondent   Enter Code    1 Indicate Primary respondent for Daily and Activity Preferences ( and )  1 - Resident  2 - Family or Significant other (close friend or other representative)  5 - Interview could not be completed by resident or family/significant other (no response to 3 or more items)

## 2019-07-02 PROCEDURE — 97530 THERAPEUTIC ACTIVITIES: CPT

## 2019-07-02 PROCEDURE — 97110 THERAPEUTIC EXERCISES: CPT

## 2019-07-02 PROCEDURE — 6370000000 HC RX 637 (ALT 250 FOR IP): Performed by: ORTHOPAEDIC SURGERY

## 2019-07-02 PROCEDURE — 97535 SELF CARE MNGMENT TRAINING: CPT

## 2019-07-02 PROCEDURE — 97116 GAIT TRAINING THERAPY: CPT

## 2019-07-02 PROCEDURE — 1290000000 HC SEMI PRIVATE OTHER R&B

## 2019-07-02 PROCEDURE — 6370000000 HC RX 637 (ALT 250 FOR IP): Performed by: INTERNAL MEDICINE

## 2019-07-02 RX ADMIN — ROPINIROLE HYDROCHLORIDE 1 MG: 1 TABLET, FILM COATED ORAL at 20:26

## 2019-07-02 RX ADMIN — BUSPIRONE HYDROCHLORIDE 10 MG: 10 TABLET ORAL at 20:26

## 2019-07-02 RX ADMIN — PANTOPRAZOLE SODIUM 40 MG: 40 TABLET, DELAYED RELEASE ORAL at 05:58

## 2019-07-02 RX ADMIN — AMLODIPINE BESYLATE 5 MG: 5 TABLET ORAL at 09:57

## 2019-07-02 RX ADMIN — PANTOPRAZOLE SODIUM 40 MG: 40 TABLET, DELAYED RELEASE ORAL at 17:58

## 2019-07-02 RX ADMIN — HYDROCODONE BITARTRATE AND ACETAMINOPHEN 2 TABLET: 5; 325 TABLET ORAL at 21:57

## 2019-07-02 RX ADMIN — APIXABAN 5 MG: 5 TABLET, FILM COATED ORAL at 20:26

## 2019-07-02 RX ADMIN — ATORVASTATIN CALCIUM 40 MG: 40 TABLET, FILM COATED ORAL at 20:26

## 2019-07-02 RX ADMIN — METOPROLOL TARTRATE 50 MG: 50 TABLET, FILM COATED ORAL at 20:26

## 2019-07-02 RX ADMIN — HYDROCODONE BITARTRATE AND ACETAMINOPHEN 2 TABLET: 5; 325 TABLET ORAL at 14:42

## 2019-07-02 RX ADMIN — HYDROCODONE BITARTRATE AND ACETAMINOPHEN 2 TABLET: 5; 325 TABLET ORAL at 05:59

## 2019-07-02 RX ADMIN — BUSPIRONE HYDROCHLORIDE 10 MG: 10 TABLET ORAL at 14:42

## 2019-07-02 RX ADMIN — MULTIPLE VITAMINS W/ MINERALS TAB 1 TABLET: TAB at 09:56

## 2019-07-02 RX ADMIN — APIXABAN 5 MG: 5 TABLET, FILM COATED ORAL at 09:56

## 2019-07-02 RX ADMIN — METOPROLOL TARTRATE 50 MG: 50 TABLET, FILM COATED ORAL at 09:56

## 2019-07-02 RX ADMIN — BUSPIRONE HYDROCHLORIDE 10 MG: 10 TABLET ORAL at 09:56

## 2019-07-02 ASSESSMENT — PAIN SCALES - GENERAL
PAINLEVEL_OUTOF10: 7
PAINLEVEL_OUTOF10: 0
PAINLEVEL_OUTOF10: 8
PAINLEVEL_OUTOF10: 7
PAINLEVEL_OUTOF10: 7

## 2019-07-02 ASSESSMENT — PAIN - FUNCTIONAL ASSESSMENT: PAIN_FUNCTIONAL_ASSESSMENT: PREVENTS OR INTERFERES SOME ACTIVE ACTIVITIES AND ADLS

## 2019-07-02 ASSESSMENT — PAIN DESCRIPTION - DESCRIPTORS: DESCRIPTORS: ACHING

## 2019-07-02 ASSESSMENT — PAIN DESCRIPTION - ORIENTATION: ORIENTATION: RIGHT

## 2019-07-02 ASSESSMENT — PAIN DESCRIPTION - ONSET: ONSET: ON-GOING

## 2019-07-02 ASSESSMENT — PAIN DESCRIPTION - FREQUENCY: FREQUENCY: CONTINUOUS

## 2019-07-02 ASSESSMENT — PAIN DESCRIPTION - LOCATION: LOCATION: SHOULDER

## 2019-07-02 ASSESSMENT — PAIN DESCRIPTION - PROGRESSION: CLINICAL_PROGRESSION: NOT CHANGED

## 2019-07-02 NOTE — PROGRESS NOTES
INTERNAL MEDICINE Progress Note  7/2/2019 2:02 PM  Subjective:   Admit Date: 6/20/2019  PCP: Cristobal Sheldon MD  Interval History:   No new c/o    Objective:   Vitals: /62   Pulse 76   Temp 97.9 °F (36.6 °C) (Oral)   Resp 16   Ht 5' 3\" (1.6 m)   Wt 127 lb 1 oz (57.6 kg)   SpO2 96%   BMI 22.51 kg/m²   General appearance: alert and cooperative with exam  HEENT:  atraumatic  Neck: no JVD  Lungs: diminished breath sounds bilaterally  Heart: S1, S2 normal  Abdomen: soft, non-tender; bowel sounds normal; no masses,  no organomegaly  Extremities: no edema and rt arm sling  Neurologic:  Alert, oriented, thought content appropriate      Medications:   Scheduled Meds:   busPIRone  10 mg Oral TID    amLODIPine  5 mg Oral Daily    apixaban  5 mg Oral BID    atorvastatin  40 mg Oral Nightly    docusate sodium  100 mg Oral BID    metoprolol tartrate  50 mg Oral BID    pantoprazole  40 mg Oral BID    polyethylene glycol  17 g Oral Daily    rOPINIRole  1 mg Oral Nightly    therapeutic multivitamin-minerals  1 tablet Oral Daily    ferrous sulfate  325 mg Oral BID WC     Continuous Infusions:    Lab Results:   CBC:   Recent Labs     07/01/19 0418   WBC 7.7   HGB 9.5*        BMP:    Recent Labs     07/01/19  0418      K 4.4      CO2 27   BUN 14   CREATININE 0.6   GLUCOSE 99     FOLATE:    Lab Results   Component Value Date    FOLATE > 20.0 06/19/2019     IRON:    Lab Results   Component Value Date    IRON 20 06/19/2019     FERRITIN:    Lab Results   Component Value Date    FERRITIN 98 06/19/2019       Assessment and Plan:   1. Fall with fracture rt proximal humerus  2. Fracture L1 vertebral body  3. COPD  4. Bronchiectasis  5. Acute on chronic hypoxemic resp failure  6. RLS  7. Anemia,      Cont analgesics,   bowel regimen. Bronchodilators. Plan is to dc home in am with [facility].     Cristobal Sheldon MD

## 2019-07-02 NOTE — PROGRESS NOTES
complete toileting routine with Elizabeth to increase indep with clothing management. MET, SEE LTG  Long term goals  Time Frame for Long term goals : 4 weeks  Long term goal 1: Pt will complete ADL routine including shower t/f with S to increase indep with self care. Long term goal 2: Pt will complete light homemaking task with S to increase indep with light cooking and cleaning at PLOF. Following session, patient left in safe position with all fall risk precautions in place.

## 2019-07-02 NOTE — PROGRESS NOTES
[address] [address]  INPATIENT PHYSICAL THERAPY  DAILY NOTE  - [place] SKILLED NURSING UNIT - 8E-67/067-A    Time In: 1018  Time Out: 1102  Timed Code Treatment Minutes: 40 Minutes  Minutes: 44          Date: 2019  Patient Name: Deanna Araujo,  Gender:  female        MRN: [id number]  : 1935  (80 y.o.)  Referral Date : 19  Referring Practitioner: Alexa Nunes MD (referring); Theressa Ormond, MD (attending)  Diagnosis: Closed compression fx of body of L1 vertebrae  Additional Pertinent Hx: Pt admitted following a fall 2 days ago in which she fell onto a out stretched Right UE causing humerus and ulna fx and a L1 vert fx . Transferred to [facility] . Prior Level of Function:  Lives With: Alone  Type of Home: House  Home Layout: Two level  Home Access: Stairs to enter without rails  Entrance Stairs - Number of Steps: 1  Home Equipment: Wheelchair-manual, Rolling walker    Restrictions/Precautions:  Restrictions/Precautions: Weight Bearing, General Precautions, Fall Risk  Right Upper Extremity Weight Bearing: Non Weight Bearing  Right Upper Extremity Brace/Splint: sling and swath for fx humerus and ulna and wrist splint  Required Braces or Orthoses  Spinal: Lumbar Corset  Spinal Other: on when OOB  Right Upper Extremity Brace/Splint: Sling  Right Upper Extremity Brace/Splint: sling and swath for fx humerus and ulna and wrist splint  Position Activity Restriction  Other position/activity restrictions: lumbar corset on when OOB , NWB right UE, May come out of swathe, May come out of sling for gentle elbow ROM.     SUBJECTIVE: pleasant, co-operative, very happy to be going home tomorrow    PAIN:  No# given for right knee pain      OBJECTIVE:  Bed Mobility:  Rolling to Left: Modified Independent   Supine to sit with hob flat, no rail MOD I   Sit to supine with hob flat, no rail , MOD I    Transfers:  Sit to Stand: Modified Independent  STAND TO SIT: MOD I    Ambulation:  Assistance: SBA-> SUPERVISION, MOD I

## 2019-07-03 VITALS
RESPIRATION RATE: 15 BRPM | HEART RATE: 76 BPM | TEMPERATURE: 98.2 F | BODY MASS INDEX: 22.71 KG/M2 | OXYGEN SATURATION: 97 % | HEIGHT: 63 IN | WEIGHT: 128.2 LBS | SYSTOLIC BLOOD PRESSURE: 130 MMHG | DIASTOLIC BLOOD PRESSURE: 62 MMHG

## 2019-07-03 PROBLEM — S42.201A CLOSED FRACTURE OF PROXIMAL END OF RIGHT HUMERUS: Status: ACTIVE | Noted: 2019-07-03

## 2019-07-03 PROCEDURE — 6370000000 HC RX 637 (ALT 250 FOR IP): Performed by: ORTHOPAEDIC SURGERY

## 2019-07-03 PROCEDURE — 6370000000 HC RX 637 (ALT 250 FOR IP): Performed by: INTERNAL MEDICINE

## 2019-07-03 RX ORDER — FERROUS SULFATE 325(65) MG
325 TABLET ORAL 2 TIMES DAILY WITH MEALS
Qty: 60 TABLET | Refills: 3 | Status: SHIPPED | OUTPATIENT
Start: 2019-07-03 | End: 2020-05-06

## 2019-07-03 RX ORDER — HYDROCODONE BITARTRATE AND ACETAMINOPHEN 5; 325 MG/1; MG/1
1 TABLET ORAL EVERY 6 HOURS PRN
Qty: 12 TABLET | Refills: 0 | Status: SHIPPED | OUTPATIENT
Start: 2019-07-03 | End: 2019-07-06

## 2019-07-03 RX ORDER — PSEUDOEPHEDRINE HCL 30 MG
100 TABLET ORAL 2 TIMES DAILY
Qty: 60 CAPSULE | Refills: 2 | Status: SHIPPED | OUTPATIENT
Start: 2019-07-03 | End: 2019-08-02

## 2019-07-03 RX ADMIN — AMLODIPINE BESYLATE 5 MG: 5 TABLET ORAL at 08:47

## 2019-07-03 RX ADMIN — FERROUS SULFATE TAB 325 MG (65 MG ELEMENTAL FE) 325 MG: 325 (65 FE) TAB at 08:47

## 2019-07-03 RX ADMIN — PANTOPRAZOLE SODIUM 40 MG: 40 TABLET, DELAYED RELEASE ORAL at 05:04

## 2019-07-03 RX ADMIN — BUSPIRONE HYDROCHLORIDE 10 MG: 10 TABLET ORAL at 08:47

## 2019-07-03 RX ADMIN — HYDROCODONE BITARTRATE AND ACETAMINOPHEN 2 TABLET: 5; 325 TABLET ORAL at 05:04

## 2019-07-03 RX ADMIN — HYDROCODONE BITARTRATE AND ACETAMINOPHEN 2 TABLET: 5; 325 TABLET ORAL at 12:24

## 2019-07-03 RX ADMIN — APIXABAN 5 MG: 5 TABLET, FILM COATED ORAL at 08:47

## 2019-07-03 RX ADMIN — METOPROLOL TARTRATE 50 MG: 50 TABLET, FILM COATED ORAL at 08:47

## 2019-07-03 RX ADMIN — MULTIPLE VITAMINS W/ MINERALS TAB 1 TABLET: TAB at 08:47

## 2019-07-03 ASSESSMENT — PAIN SCALES - GENERAL
PAINLEVEL_OUTOF10: 7
PAINLEVEL_OUTOF10: 7

## 2019-07-03 NOTE — DISCHARGE SUMMARY
daily             Multiple Vitamins-Minerals (PRESERVISION AREDS PO)  Take  by mouth daily. nitroGLYCERIN (NITROSTAT) 0.4 MG SL tablet  Place 1 tablet under the tongue every 5 minutes as needed for Chest pain up to max of 3 total doses. If no relief after 1 dose, call 911. pantoprazole (PROTONIX) 40 MG tablet  Take 1 tablet by mouth 2 times daily             ropinirole (REQUIP) 0.25 MG tablet  Take 1 mg by mouth nightly. Consults:  rehabilitation medicine and orthopedic surgery    Significant Diagnostic Studies: labs: CBC:       Recent Labs     07/01/19 0418   WBC 7.7   HGB 9.5*         BMP:        Recent Labs     07/01/19 0418      K 4.4      CO2 27   BUN 14   CREATININE 0.6   GLUCOSE 99      FOLATE:          Lab Results   Component Value Date     FOLATE > 20.0 06/19/2019      IRON:          Lab Results   Component Value Date     IRON 20 06/19/2019      FERRITIN:          Lab Results   Component Value Date     FERRITIN 98 06/19/2019         Assessment and Plan:   8. Fall with fracture rt proximal humerus  9. Fracture L1 vertebral body  10. COPD  6. Bronchiectasis  12. Acute on chronic hypoxemic resp failure  13. RLS  14. Anemia,      Treatments:   analgesics,   bowel regimen. Bronchodilators. dc home with [facility]. F/up orthopedics and spine surgery as OP.       Disposition:   Home with [facility]    Signed:  Mery Kam  7/3/2019, 11:51 AM

## 2019-07-03 NOTE — PROGRESS NOTES
[address] [address]  Recreational Therapy  Discharge Note  Transitional Care Unit           Date:  7/3/2019       Patient Name: Ginger Diamond      MRN: [id number]       YOB: 1935 (80 y.o.)       Gender: female  Diagnosis: Closed compression fracture of body of L1 vertebra  Referring Practitioner: Marilyn Thomas MD; Attending: Dr. Ousmane Patel    Patient discharged from Recreational Therapy at this time. See recreational therapy notes for details.     Electronically signed by: ARAM Chao  Date: 7/3/2019

## 2019-07-03 NOTE — PLAN OF CARE
7/3/19, 9:16 AM    Discharge plan discussed by /. Patient's Niece will be picking her up after 1 pm today. Patient going home alone but with the support of her daughter and brothers. Patient will be receiving PT,OT,Rn and Aide services through [facility]. Patient will also be going home with DME ordered by PT and Occupational therapy through [address] [place] [address] DME  Discharge plan reviewed with patient/ family. Patient/ family verbalized understanding of discharge plan and are in agreement with plan. Understanding was demonstrated using the teach back method. IMM Letter  Notice of Medicare Non-Coverage Letter Not Given?  (Post Acute): Patient Initiated Discharge
Problem: Bleeding:  Goal: Will show no signs and symptoms of excessive bleeding  Description  Will show no signs and symptoms of excessive bleeding  Outcome: Ongoing  Note:   Patient is bruised from fracture, but no new bleeding is visualized. Problem: Pain:  Goal: Pain level will decrease  Description  Pain level will decrease  Outcome: Ongoing  Note:   Pain treated per physician's orders. Problem: Mobility - Impaired:  Goal: Mobility will improve  Description  Mobility will improve  Outcome: Ongoing  Note:   Patient is getting PT and OT. Problem: Falls - Risk of:  Goal: Will remain free from falls  Description  Will remain free from falls  Outcome: Ongoing  Note:   Patient is using bed and chair alarms, gait belt, and quad cane; using call light appropriately. Problem: Nutrition  Goal: Optimal nutrition therapy  Outcome: Ongoing  Note:   Patient is tolerating general diet well. Problem: Discharge Planning:  Goal: Patients continuum of care needs are met  Description  Patients continuum of care needs are met  Outcome: Ongoing  Note:   No discharge plans at this time.
Problem: Bowel/Gastric:  Goal: Ability to achieve a regular elimination pattern will improve  Description  Ability to achieve a regular elimination pattern will improve  6/29/2019 1303 by Yadi Argueta RN  Outcome: Ongoing  Note:   Patient is getting stool softener and fiber to assist with regularity. Problem: Pain:  Goal: Pain level will decrease  Description  Pain level will decrease  6/29/2019 1303 by Yadi Argueta RN  Outcome: Ongoing  Note:   Pain treated per physician's orders. Patient satisfied. Problem: Mobility - Impaired:  Goal: Mobility will improve  Description  Mobility will improve  6/29/2019 1303 by Yadi Argueta RN  Outcome: Ongoing  Note:   Patient getting PT and OT. Problem: Falls - Risk of:  Goal: Will remain free from falls  Description  Will remain free from falls  6/29/2019 1303 by Yadi Argueta RN  Outcome: Ongoing  Note:   Patient using bed and chair alarms, gait belt, walker, and is using call light appropriately. Problem: Discharge Planning:  Goal: Patients continuum of care needs are met  Description  Patients continuum of care needs are met  6/29/2019 1303 by Yadi Argueta RN  Outcome: Ongoing  Note:   No discharge plans at this time.
Problem: Impaired respiratory status  Goal: Clear lung sounds  Description  Clear lung sounds     Outcome: Ongoing   Improve breath sounds, increase aeration and decrease WOB.
Problem: Respiratory  Goal: O2 Sat > 90%  6/24/2019 1430 by Morenita Patel RN  Outcome: Ongoing  Note:   Her Oxygenation is in the 90's. Encouraging patient to use her incentive spirometer. 6/24/2019 0430 by Josselin Alejandre LPN  Outcome: Ongoing  Note:   Patient continues to receive 02 @ 1L via nasal canula. Patients oxygen saturation recorded at 79% on room air. Patient saturated rate recorder at 94% while receiving 1l/min of o2 via Nasal canula.       Problem: Respiratory  Goal: Supplemental O2 requirements decreased  Outcome: Ongoing  Note:   Not able to wean from 02 due to her oxygenation in 90's
Problem: Respiratory  Goal: O2 Sat > 90%  Outcome: Ongoing  Note:   Patient continues to receive 02 @ 1L via nasal canula. Patients oxygen saturation recorded at 79% on room air. Patient saturated rate recorder at 94% while receiving 1l/min of o2 via Nasal canula. Problem: Falls - Risk of:  Goal: Will remain free from falls  Description  Will remain free from falls  Outcome: Met This Shift  Note:   Patient remains free of falls this shift. Patient continues to use ambulatory aids and call light appropriately.
Team Conference held 6/25. Recommendations of the team were explained to the patient by SW and Medical Director. Team is recommending that patient continue on TCU for several more days for continued therapies, discharge date is undetermined at this time. Following discharge team is recommending possible home health services.
Team Conference held this afternoon. Recommendations of the team were explained to the patient by THOMAS and Medical Director Team is recommending that patient continue on TCU Friday of this week, patient is requesting to be discharged tomorrow,7/3. Patient will be discharging home with home health services through [address] [place] [address] per patient's request. Patient will have PT,OT,Nursing and Aide services. Patient has a very supportive family that will be providing assist. Patient's 2 brothers live right down the road and will be checking in with patient daily. Josh to transport patient back home. THOMAS made referral this dare [place] for PT,OT,RN and aide services. SW will continue to assist with any additional discharge planning.
(LIPITOR) tablet 40 mg  40 mg Oral Nightly Erika Paige MD   40 mg at 06/24/19 2028    docusate sodium (COLACE) capsule 100 mg  100 mg Oral BID Erika Paige MD   100 mg at 06/25/19 0837    HYDROcodone-acetaminophen (NORCO) 5-325 MG per tablet 1 tablet  1 tablet Oral Q6H PRN Erika Paige MD   1 tablet at 06/24/19 1201    Or    HYDROcodone-acetaminophen (NORCO) 5-325 MG per tablet 2 tablet  2 tablet Oral Q6H PRN Erika Paige MD   2 tablet at 06/25/19 0554    ipratropium-albuterol (DUONEB) nebulizer solution 1 ampule  1 ampule Inhalation Q4H [place] Erika Paige MD   1 ampule at 06/25/19 1254    metoprolol tartrate (LOPRESSOR) tablet 50 mg  50 mg Oral BID Erika Paige MD   50 mg at 06/25/19 0831    nitroGLYCERIN (NITROSTAT) SL tablet 0.4 mg  0.4 mg Sublingual Q5 Min PRN Erika Paige MD        ondansetron [facility]) injection 4 mg  4 mg Intravenous Q6H PRN Erika Paige MD        pantoprazole (PROTONIX) tablet 40 mg  40 mg Oral BID Erika Paige MD   40 mg at 06/25/19 0525    polyethylene glycol (GLYCOLAX) packet 17 g  17 g Oral Daily Erika Paige MD        rOPINIRole (REQUIP) tablet 1 mg  1 mg Oral Nightly Erika Paige MD   1 mg at 06/24/19 2028    therapeutic multivitamin-minerals 1 tablet  1 tablet Oral Daily Erika Paige MD   1 tablet at 06/25/19 8132    ferrous sulfate tablet 325 mg  325 mg Oral BID  Jorge L Mae MD   325 mg at 06/25/19 0831       Plan of Care Problems and Goals      Problem: Pain    Goal: Pain Level will decrease   Problem: Falls- Risk of    Goal: Absence of falls    Goal: Absence of physical injury   Problem: Risk of Impaired Skin Integrity    Goal: Absence of new skin breakdown   Problem: Risk of Bleeding    Goal: Will show no signs and symptoms of excessive bleeding     Problem: Nutrition    Goal: Optimal nutrition therapy   Problem: Impaired Mobility    Goal: Mobility will improve   Problem: Discharge Planning    Goal: Continuum of care needs are
alarm on, siderails up x 2       Problem: Falls - Risk of:  Goal: Absence of physical injury  Description  Absence of physical injury  Outcome: Ongoing     Problem: Risk for Impaired Skin Integrity  Goal: Tissue integrity - skin and mucous membranes  Description  Structural intactness and normal physiological function of skin and  mucous membranes. Outcome: Ongoing  Note:   Pt has no s/s of infection. Patient able to reposition self frequently to prevent breakdown. Will continue to monitor. Problem: SAFETY  Goal: LTG - Patient will demonstrate safety requirements appropriate to situation/environment  Outcome: Ongoing     Problem: Nutrition  Goal: Optimal nutrition therapy  Outcome: Ongoing     Problem: IP BALANCE  Goal: LTG - Patient will maintain balance to allow for safe/functional mobility  Outcome: Ongoing     Problem: Discharge Planning:  Goal: Patients continuum of care needs are met  Description  Patients continuum of care needs are met  Outcome: Ongoing     Problem: Anxiety:  Goal: Level of anxiety will decrease  Description  Level of anxiety will decrease  Outcome: Ongoing  Note:   Patient calm and cooperative with care. PRN medications available. See MAR. Patient verbalizes understanding of uses and side effects of medications. Care plan reviewed with patient. Patient verbalizes understanding of care plan and treatment goals.
anxiety will decrease  Description  Level of anxiety will decrease  6/29/2019 0259 by Zohreh Gross, RN  Outcome: Ongoing  Note:   Patient calm and cooperative. No prn antianxiety medication administered. Decreased distractions and quiet room seemed to help patient.

## 2019-07-08 NOTE — PROGRESS NOTES
so fidgety or restless that s/he has been moving around a lot more than usual   0   0   I. Thoughts that you would be better off dead, or of hurting yourself in some way. 0 0              Patient Name: Rick Sky        MRN: [id number]    : 1935  (80 y.o.)  Gender: female   Principal Problem: <principal problem not specified>    Section J    Health Conditions    Should Pain Assessment Interview be Conducted? Attempt to conduct interview with all residents. If resident is comatose, skip to , Shortness of Breath (dyspnea)   Enter Code  1 0 - No à (resident is rarely/never understood) à Skip to [address] of Breath  1 - Yes à Continue to , Pain Presence   Pain Assessment Interview   Pain Presence   Enter Code  1 Ask resident: Ion Roman you had pain or hurting at any time in the last 5 days?   0 - No à Skip to , Shortness of Breath  1 - Yes  à Continue to , Pain Frequency  9 - Unable to answer à Skip to , Shortness of Breath    Pain Frequency   Enter Code          3 Ask resident: Velora Cari much of the time have you experienced pain or hurting over the last 5 days?   1 - Almost constantly  2 - Frequently  3 - Occasionally  4 - Rarely  9 - Unable to answer    Pain Effect on Function   Enter Code      0 Ask resident: Clarisse Blanco the last 5 days, has pain made it hard for you to sleep at night?   0 - No   1 - Yes   9 - Unable to answer    Enter Code      0 Ask resident: Clarisse Velabourne the last 5 days, have you limited your day-to-day activities because of pain?   0 - No   1 - Yes   9 - Unable to answer     Pain Intensity   Enter Code      08 A. Numeric Rating Scale (00-10)  Ask resident: Nichol Bowie rate your worst pain over the last 5 days on a zero to ten scale, with zero being no pain and ten as the worst pain you can imagine.  (Show resident 00-10 pain scale)  Enter two-digit response. Enter 99 if unable to answer.

## 2019-07-11 ENCOUNTER — TELEPHONE (OUTPATIENT)
Dept: CARDIOLOGY CLINIC | Age: 84
End: 2019-07-11

## 2019-09-30 ENCOUNTER — OFFICE VISIT (OUTPATIENT)
Dept: CARDIOLOGY CLINIC | Age: 84
End: 2019-09-30
Payer: MEDICARE

## 2019-09-30 VITALS
BODY MASS INDEX: 22.75 KG/M2 | WEIGHT: 128.38 LBS | DIASTOLIC BLOOD PRESSURE: 74 MMHG | SYSTOLIC BLOOD PRESSURE: 120 MMHG | HEART RATE: 80 BPM | HEIGHT: 63 IN

## 2019-09-30 DIAGNOSIS — I35.0 MODERATE AORTIC STENOSIS: ICD-10-CM

## 2019-09-30 DIAGNOSIS — Z95.0 PACEMAKER: ICD-10-CM

## 2019-09-30 DIAGNOSIS — I25.10 CORONARY ARTERY DISEASE INVOLVING NATIVE CORONARY ARTERY OF NATIVE HEART WITHOUT ANGINA PECTORIS: Primary | ICD-10-CM

## 2019-09-30 DIAGNOSIS — I48.91 ATRIAL FIBRILLATION, UNSPECIFIED TYPE (HCC): ICD-10-CM

## 2019-09-30 PROCEDURE — G8427 DOCREV CUR MEDS BY ELIG CLIN: HCPCS | Performed by: INTERNAL MEDICINE

## 2019-09-30 PROCEDURE — 1123F ACP DISCUSS/DSCN MKR DOCD: CPT | Performed by: INTERNAL MEDICINE

## 2019-09-30 PROCEDURE — 99213 OFFICE O/P EST LOW 20 MIN: CPT | Performed by: INTERNAL MEDICINE

## 2019-09-30 PROCEDURE — G8400 PT W/DXA NO RESULTS DOC: HCPCS | Performed by: INTERNAL MEDICINE

## 2019-09-30 PROCEDURE — 4040F PNEUMOC VAC/ADMIN/RCVD: CPT | Performed by: INTERNAL MEDICINE

## 2019-09-30 PROCEDURE — 1090F PRES/ABSN URINE INCON ASSESS: CPT | Performed by: INTERNAL MEDICINE

## 2019-09-30 PROCEDURE — G8598 ASA/ANTIPLAT THER USED: HCPCS | Performed by: INTERNAL MEDICINE

## 2019-09-30 PROCEDURE — 1036F TOBACCO NON-USER: CPT | Performed by: INTERNAL MEDICINE

## 2019-09-30 PROCEDURE — G8420 CALC BMI NORM PARAMETERS: HCPCS | Performed by: INTERNAL MEDICINE

## 2019-09-30 NOTE — PROGRESS NOTES
for: MG    Lab Results   Component Value Date    INR 1.17 (H) 02/20/2019    PROTIME 1.20 (H) 07/20/2011         No results found for: LABA1C    Lab Results   Component Value Date    TRIG 146 06/03/2016    HDL 97 06/03/2016    LDLCALC 100 06/03/2016       Lab Results   Component Value Date    TSH 1.860 06/03/2016         Testing Reviewed:      I have individually reviewed the cardiac test below:    ECHO:   Results for orders placed during the hospital encounter of 02/05/19   Echo 2D w doppler w color complete    Narrative Transthoracic Echocardiography Report (TTE)     Demographics      Patient Name   Iris Mcgrath  Gender               Female                  D      MR #           [id number]     Race                                                    Ethnicity      Account #      [de-identified]     Room Number      Accession      [id number]     Date of Study        02/05/2019   Number      Date of Birth  1935    Referring Physician  Edgar Salmeron MD      Age            80 year(s)    Megan Rodas MD                                Physician     Procedure    Type of Study      TTE procedure:ECHOCARDIOGRAM COMPLETE 2D W DOPPLER W COLOR. Procedure Date  Date: 02/05/2019 Start: 12:59 PM    Study Location: Echo Lab  Technical Quality: Limited visualization due to lung interface. Indications: Aortic stenosis. Additional Medical History:[employer] dual pacemaker, GERD, COPD, abnormal  stress test, atrial flutter, coronary artery disease, atherosclerotic heart  disease, sick sinus syndrome    Patient Status: Routine    Height: 62 inches Weight: 133 pounds BSA: 1.61 m^2 BMI: 24.33 kg/m^2    BP: 126/62 mmHg    Allergies    - See Epic. Conclusions      Summary   Ejection fraction was estimated at 60-65%.    Left atrial size was moderately wall motion   abnormalities and wall thickness was within normal limits. Doppler parameters were consistent with abnormal left ventricular   relaxation (grade 1 diastolic dysfunction). Right Atrium   Right atrial size was normal.      Right Ventricle   The right ventricular size was normal with normal systolic function and   wall thickness. Device lead/catheter seen in the right heart. Pericardial Effusion   The pericardium was normal in appearance with no evidence of a pericardial   effusion. Pleural Effusion   No evidence of pleural effusion. Aorta / Great Vessels   -Ascending aorta = 2.7 cm. -IVC size is within normal limits with normal respiratory phasic changes.      M-Mode/2D Measurements & Calculations      LV Diastolic   LV Systolic Dimension:    AV Cusp Separation: 1.2 cmLA   Dimension: 4   2.5 cm                    Dimension: 3.7 cmAO Root   cm             LV Volume Diastolic: 70   Dimension: 2.7 cmLA Area: 20.9   LV FS:37.5 %   ml                        cm^2   LV PW          LV Volume Systolic: 63.7   Diastolic: 1.1 ml   cm             LV EDV/LV EDV Index: 70   Septum         ml/43 m^2LV ESV/LV ESV    RV Diastolic Dimension: 3.3 cm   Diastolic: 1.1 Index: 73.1 ml/14 m^2   cm             EF Calculated: 68.1 %     LA/Aorta: 1.37                                               LA volume/Index: 64 ml /40m^2                     LVOT: 1.8 cm     Doppler Measurements & Calculations      MV Peak E-Wave: 80.5 AV Peak Velocity: 189   LVOT Peak Velocity: 100 cm/s   cm/s                 cm/s                    LVOT Mean Velocity: 75.2 cm/s   MV Peak A-Wave: 110  AV Peak Gradient: 14.29 LVOT Peak Gradient: 4   cm/s                 mmHg                    mmHgLVOT Mean Gradient: 3   MV E/A Ratio: 0.73   AV Mean Velocity: 120   mmHg   MV Peak Gradient:    cm/s   2.59 mmHg            AV Mean Gradient: 9     TV Peak E-Wave: 51.9 cm/s   MV Mean Gradient: 3  mmHg                    TV Peak A-Wave: 49.5 cm/s   mmHg                 AV VTI: 46.5 cm   MV Mean Velocity:    AV Area                 TV Peak Gradient: 1.08 mmHg   81.4 cm/s            (Continuity):1.21 cm^2  TR Velocity:291 cm/s   MV Deceleration                              TR Gradient:33.87 mmHg   Time: 296 msec       LVOT VTI: 22.1 cm       PV Peak Velocity: 72.8 cm/s   MV P1/2t: 100 msec   IVRT: 95 msec           PV Peak Gradient: 2.12 mmHg   MVA by PHT:2.2 cm^2   MV Area   (continuity): 1.73   AV DVI (VTI): 0.48AV   cm^2                 DVI (Vmax):0.53   MV E' Septal   Velocity: 5.9 cm/s   MV A' Septal   Velocity: 11.4 cm/s   MV E' Lateral   Velocity: 5.5 cm/s   MV A' Lateral   Velocity: 13.1 cm/s   E/E' septal: 13.64   E/E' lateral: 14.64   MR Velocity: 444   cm/s     [email or web address] [id number]        Assessment/Plan   5/2018 PCI of RCA 3.0 x 38 and LAD ISR s/p PTCA with 3.0 x 20 on Plavix  Afib/flutter on Eliquis  Preserved EF ~ 50-55%, inferior wall hypokinesis  Hx of moderate aortic stenosis 5/2018  S/p PM for SSS - NURIA  Recent fall - no bleeding  No issues with Eliquis/Plavix in terms of bleeding. She wants to continue the medications. She understands the risk. She is on OMT. Discussed diet/exercise/BP/weight loss/health lifestyle choices/lipids; the patient understands the goals and will try to comply.     Disposition:  1 year         Electronically signed by lAisha Seth MD   9/30/2019 at 10:14 AM

## 2019-10-07 ENCOUNTER — TELEPHONE (OUTPATIENT)
Dept: CARDIOLOGY CLINIC | Age: 84
End: 2019-10-07

## 2020-01-07 ENCOUNTER — PROCEDURE VISIT (OUTPATIENT)
Dept: CARDIOLOGY CLINIC | Age: 85
End: 2020-01-07
Payer: MEDICARE

## 2020-01-07 PROCEDURE — 93296 REM INTERROG EVL PM/IDS: CPT | Performed by: INTERNAL MEDICINE

## 2020-01-07 PROCEDURE — 93294 REM INTERROG EVL PM/LDLS PM: CPT | Performed by: INTERNAL MEDICINE

## 2020-01-07 NOTE — PROGRESS NOTES
[employer] dual pacemaker  Battery 8.9yrs  A paced 62%  V Paced >99%  p wave 4.0mV   r wave 6.5mV   atrial impedance 440 ohms  Ventricle impedance 430 ohms  Appropriate mode switches  A fib burden <1%

## 2020-02-26 NOTE — TELEPHONE ENCOUNTER
Pt lm stating since being on eliquis she is having BAD headaches, any recommendations? Surgery Preparation    You will receive a phone call from the [facility] within 1-2 days. If you do not receive a call within 2 days, contact [phone] to schedule the procedure. You can write the location/date/time of surgery in the space provided below for your records.    Location of Surgery:                                          Date of Surgery:                                                 Time of Arrival:                                                   Time of Surgery:                                                   Please arrange an appointment with a primary care provider for a pre-op physical. This is a mandatory appointment that needs to be completed within the two weeks prior to your surgery date. This gives clearance for you to receive anesthesia during your procedure. If you have had a pre-op physical within 30 days of your surgery date, you may not need another one. Check with your provider.      Starting the day prior to surgery, clear liquids only up to midnight. Take bowel prep as prescribed by your physician. Magnesium Citrate (10oz bottle) at 12:00pm and 4:00pm to clean out your intestines. Administer a Fleet enema the morning of surgery.   Clear liquids  include water, soda, coffee (without cream), tea, broth, and jello.    Please stop all over the counter blood thinners such as Aspirin, Advil, Aleve, Ibuprofen, Motrin, and Excedrin one week prior to surgery. Please discontinue prescription blood thinners 5-7 days prior to surgery, per your primary physician's orders.     Please check with your insurance company to confirm that your procedure is covered, and that the facility is in-network.     Call the Urology Department @ [phone] if you have questions about your surgery, or if you need to reschedule your procedure.     Patient Education     Prostate Cancer: Radical Prostatectomy     The prostate, seminal vesicles, and a portion of the urethra are removed.    Radical prostatectomy is surgery to remove the entire prostate. It may be done if tests show that the cancer is confined to the prostate. Your surgeon will give you detailed instructions on getting ready for surgery. After surgery, you ll be told how to care for yourself at home as you recover. Be sure to ask any questions you have about the procedure and recovery.  Risks and possible complications  All surgeries have risks. The risks of this surgery include:    Blood clots    Excess bleeding    Hole (perforation) in the bowel    Infection    Loss of bladder control (incontinence)    Lung infection (pneumonia)    Trouble getting or keeping an erection (erectile dysfunction)    Trouble urinating  Getting ready for your surgery     The urethra is reattached to the bladder. A catheter is inserted to drain urine while you heal. A balloon holds the catheter in place.     Follow all instructions from your healthcare team. In addition:    Tell your healthcare provider about all medicines you take. This includes herbs and other supplements. It also includes any blood thinners, such as warfarin, clopidogrel, or daily aspirin. You may need to stop taking some or all of them before the surgery.    You may be told to use a laxative, enemas, or both before the surgery. This is to empty the colon and rectum of stool. Follow the instructions you are given.    Don t eat or drink after midnight the night before surgery.  How the surgery is done  The surgery may be done through several small incisions in the abdomen. This is called laparoscopy. In many cases, a method called robotic-assisted laparoscopy is used. The robotic system helps during the surgery. It gives a 3-D view of inside the body. It also assists the surgeon s hand movements.   In some cases, the surgery may be performed through a larger incision in the abdomen. This is called the retropubic approach. Or surgery may be done through an incision behind the scrotum.  This is called the perineal approach.  During the surgery:    The surgeon may remove and check the lymph nodes near the prostate. This is to see if cancer has spread. If the cancer has spread, the surgeon may decide not to remove the prostate.    The prostate, seminal vesicles, and a portion of urethra will then be removed.    Nerve-sparing methods may be used to try to preserve erectile function.  After surgery  You will have a catheter in place to drain urine from your bladder. Urine will flow through the catheter into a sterile bag. The urine may be bloody or cloudy at first. You may go home in 1 to 3 days.  Recovering at home  You ll be given medicines to control pain. The catheter will be left in place when you go home. You ll be given instructions on how to manage it.  Follow-up care  The catheter and stitches will be removed at a follow-up visit. This is often 1 to 2 weeks after surgery. Bladder control often takes a few weeks to several months to return. Improvement can continue for up to a year.  Call your healthcare provider right away if you have any of the following:    Chills    Fever of 100.4 F (38 C) or higher, or as directed by your healthcare provider    Fluid leaking from your incision    Redness or pain in the incision that gets worse    Swelling of your leg or ankle    Trouble urinating after the catheter has been removed    Urine not draining from the catheter  Date Last Reviewed: 5/1/2017 2000-2019 [employer]. [address] [place] [address]. All rights reserved. This information is not intended as a substitute for professional medical care. Always follow your healthcare professional's instructions.

## 2020-04-20 ENCOUNTER — PROCEDURE VISIT (OUTPATIENT)
Dept: CARDIOLOGY CLINIC | Age: 85
End: 2020-04-20
Payer: MEDICARE

## 2020-04-20 PROCEDURE — 93294 REM INTERROG EVL PM/LDLS PM: CPT | Performed by: INTERNAL MEDICINE

## 2020-04-20 PROCEDURE — 93296 REM INTERROG EVL PM/IDS: CPT | Performed by: INTERNAL MEDICINE

## 2020-05-06 ENCOUNTER — TELEPHONE (OUTPATIENT)
Dept: CARDIOLOGY CLINIC | Age: 85
End: 2020-05-06

## 2020-05-12 ENCOUNTER — VIRTUAL VISIT (OUTPATIENT)
Dept: CARDIOLOGY CLINIC | Age: 85
End: 2020-05-12

## 2020-05-12 PROCEDURE — 99999 PR OFFICE/OUTPT VISIT,PROCEDURE ONLY: CPT | Performed by: INTERNAL MEDICINE

## 2020-05-12 NOTE — PROGRESS NOTES
[address] [place] [address],[place] [address] [address] Lawson[facility] [address]  [facility] [address]  Dept: [phone]  Dept Fax: [phone]  Loc: [phone]    Visit Date: 5/12/2020    TELEHEALTH EVALUATION -- Audio/Visual (During HZTQC-28 public health emergency)    APPOINTMENT CANCELED. NO CHARGE.

## 2020-06-29 RX ORDER — APIXABAN 5 MG/1
TABLET, FILM COATED ORAL
Qty: 180 TABLET | Refills: 0 | Status: SHIPPED | OUTPATIENT
Start: 2020-06-29 | End: 2021-06-16 | Stop reason: SDUPTHER

## 2020-07-15 ENCOUNTER — TELEPHONE (OUTPATIENT)
Dept: CARDIOLOGY CLINIC | Age: 85
End: 2020-07-15

## 2020-07-15 NOTE — TELEPHONE ENCOUNTER
I CALLED THIS PT BECAUSE SHE MISSED HER [address] CHECK IN OFFICE. PT TOLD ME THAT HER FAMILY DR TOLD HER THAT SHE IS NOT TO COME INTO THE HOSPITAL BECAUSE OF HER LUNG ISSUES UNTIL AFTER COVID IS OVER.  PT SAID SHE WILL BE DOING DOWNLOADS ONLY UNTIL FURTHER NOTICE

## 2020-08-07 ENCOUNTER — PROCEDURE VISIT (OUTPATIENT)
Dept: CARDIOLOGY CLINIC | Age: 85
End: 2020-08-07
Payer: MEDICARE

## 2020-08-07 PROCEDURE — 93296 REM INTERROG EVL PM/IDS: CPT | Performed by: INTERNAL MEDICINE

## 2020-08-07 PROCEDURE — 93294 REM INTERROG EVL PM/LDLS PM: CPT | Performed by: INTERNAL MEDICINE

## 2020-08-07 NOTE — PROGRESS NOTES
DR CARBONE PT London Torres AFIB/ ELIQUIS   ST PJ DUAL Christell Cortez REMOTE   BATTERY 9.3 YRS REMAINING  ATRIAL IMPEDENCE 450  VENT IMPEDENCE 430  P WAVES 4.1  RV WAVES 9.4  ATRIAL THRESHOLD PER THE DEVICE 0.625 @ 0.5  VENT THRESHOLD PER THE DEVICE 0.5 @ 0.5  ATRIAL AND VENT AMPLITUDES PROGRAMMED AUTO   DDDR   A PACED 73%  V PACED >99%

## 2020-11-13 ENCOUNTER — PROCEDURE VISIT (OUTPATIENT)
Dept: CARDIOLOGY CLINIC | Age: 85
End: 2020-11-13
Payer: MEDICARE

## 2020-11-13 PROCEDURE — 93296 REM INTERROG EVL PM/IDS: CPT | Performed by: INTERNAL MEDICINE

## 2020-11-13 PROCEDURE — 93294 REM INTERROG EVL PM/LDLS PM: CPT | Performed by: INTERNAL MEDICINE

## 2020-11-13 NOTE — PROGRESS NOTES
DR CARBONE PT/KNOWN AFLUTTER / ELIQUIS     AFIB BURDEN <1%    ST PJ DUAL PACEMAKER REMOTE  BATTERY 9.1-9.3 YRS REMAINING  ATRIAL IMPEDENCE 430  VENT IMPEDENCE 350  P WAVES 3.6  RV WAVES NOT OBTAINED PER THE DEVICE    ATRIAL THRESHOLD PER THE DEVICE 0.5 @ 0.5  VENT THRESHOLD PER THE DEVICE 0.5 @ 0.5  ATRIAL AND VENT AMPLITUDES PROGRAMMED AUTO   DDDR   A PACED 81%  V PACED >99%

## 2021-02-19 ENCOUNTER — PROCEDURE VISIT (OUTPATIENT)
Dept: CARDIOLOGY CLINIC | Age: 86
End: 2021-02-19
Payer: MEDICARE

## 2021-02-19 DIAGNOSIS — Z95.0 PACEMAKER: Primary | ICD-10-CM

## 2021-02-19 PROCEDURE — 93294 REM INTERROG EVL PM/LDLS PM: CPT | Performed by: INTERNAL MEDICINE

## 2021-02-19 PROCEDURE — 93296 REM INTERROG EVL PM/IDS: CPT | Performed by: INTERNAL MEDICINE

## 2021-06-03 ENCOUNTER — PROCEDURE VISIT (OUTPATIENT)
Dept: CARDIOLOGY CLINIC | Age: 86
End: 2021-06-03

## 2021-06-03 DIAGNOSIS — Z95.0 PACEMAKER: Primary | ICD-10-CM

## 2021-06-03 NOTE — PROGRESS NOTES
DR Reina Sethi PT Bebeto Clos AFIB Nate Cedeño   AFIB BURDEN <1%      [facility] PJ DUAL PACEMAKER REMOTE   BATTERY 9.1-9.4 YRS REMAINING  ATRIAL IMPEDENCE 440  VENT IMPEDENCE 430  P WAVES 3.5  RV WAVES NOT MEASURED PER THE DEVICE  ATRIAL THREHSOLD PER THE DEVICE 0.5 @ 0.5  VENT THRESHOLD PER THE DEVICE 0.5 @ 0.5  ATRIAL AND VENT AMPLITUDES AUTO  DDDR   A PACED 88%  V PACED >99%

## 2021-06-15 ENCOUNTER — TELEPHONE (OUTPATIENT)
Dept: CARDIOLOGY CLINIC | Age: 86
End: 2021-06-15

## 2021-06-15 NOTE — TELEPHONE ENCOUNTER
Patient had called in and left a voicemail today (06/15/2021). Called patient back to schedule a 1 year follow up for a medication refill. Please make appointment for Monday (06/21/2021) at either 11:45 or 12:15.

## 2021-06-21 ENCOUNTER — NURSE ONLY (OUTPATIENT)
Dept: CARDIOLOGY CLINIC | Age: 86
End: 2021-06-21
Payer: MEDICARE

## 2021-06-21 ENCOUNTER — OFFICE VISIT (OUTPATIENT)
Dept: CARDIOLOGY CLINIC | Age: 86
End: 2021-06-21
Payer: MEDICARE

## 2021-06-21 VITALS
HEIGHT: 63 IN | SYSTOLIC BLOOD PRESSURE: 130 MMHG | WEIGHT: 120.8 LBS | BODY MASS INDEX: 21.4 KG/M2 | DIASTOLIC BLOOD PRESSURE: 60 MMHG

## 2021-06-21 DIAGNOSIS — Z95.0 PACEMAKER: Primary | ICD-10-CM

## 2021-06-21 DIAGNOSIS — I35.0 NONRHEUMATIC AORTIC VALVE STENOSIS: ICD-10-CM

## 2021-06-21 DIAGNOSIS — I48.92 ATRIAL FLUTTER, PAROXYSMAL (HCC): Primary | ICD-10-CM

## 2021-06-21 PROCEDURE — 99213 OFFICE O/P EST LOW 20 MIN: CPT | Performed by: INTERNAL MEDICINE

## 2021-06-21 PROCEDURE — 4040F PNEUMOC VAC/ADMIN/RCVD: CPT | Performed by: INTERNAL MEDICINE

## 2021-06-21 PROCEDURE — 93000 ELECTROCARDIOGRAM COMPLETE: CPT | Performed by: INTERNAL MEDICINE

## 2021-06-21 PROCEDURE — G8420 CALC BMI NORM PARAMETERS: HCPCS | Performed by: INTERNAL MEDICINE

## 2021-06-21 PROCEDURE — 93288 INTERROG EVL PM/LDLS PM IP: CPT | Performed by: INTERNAL MEDICINE

## 2021-06-21 PROCEDURE — 1090F PRES/ABSN URINE INCON ASSESS: CPT | Performed by: INTERNAL MEDICINE

## 2021-06-21 PROCEDURE — G8400 PT W/DXA NO RESULTS DOC: HCPCS | Performed by: INTERNAL MEDICINE

## 2021-06-21 PROCEDURE — 1123F ACP DISCUSS/DSCN MKR DOCD: CPT | Performed by: INTERNAL MEDICINE

## 2021-06-21 PROCEDURE — G8427 DOCREV CUR MEDS BY ELIG CLIN: HCPCS | Performed by: INTERNAL MEDICINE

## 2021-06-21 PROCEDURE — 1036F TOBACCO NON-USER: CPT | Performed by: INTERNAL MEDICINE

## 2021-06-21 NOTE — PROGRESS NOTES
[facility] dual pacer in office     [facility]:  9.2 years on device   Presenting rhythm  AS     Atrial impedance 440  RV impedance 390    P wave sensing 4  R wave sensing 6.7    76 % atrial paced  76 % RV paced     Atrial threshold 0.375 V  at 0.5ms  RV threshold 0.5 V at 0.5ms  Mode switches   2 mode switches under 1 minute

## 2021-06-21 NOTE — PROGRESS NOTES
[facility] [address]  [place] [address]  Dept: [phone]  Dept Fax: [phone]  Loc: [phone]    Visit Date: 6/21/2021    Ms. Effie Boas is a 80 y.o. female  who presented for:  Chief Complaint   Patient presents with    1 Year Follow Up       HPI:   HPI   79 yo F hx of SSS s/p PM, Aflutter, PCI 2018 RCA and LAD, on Plavix/Eliquis who presents for follow-up. She has a hx of CHRISTI 1.2 cm2, mean gradient 16 mmHg. She sometimes has epigastric pain at night when she lays down, very infrequent. No bleeding. She does note MAJOR and fatigue. No syncope. She gets tired with ADLs as well. She is on Eliquis and Plavix. Current Outpatient Medications:     apixaban (ELIQUIS) 5 MG TABS tablet, TAKE 1 TABLET TWICE A DAY, Disp: 180 tablet, Rfl: 0    diclofenac sodium 1 % GEL, Apply 4 g topically 4 times daily as needed for Pain, Disp: 1 Tube, Rfl: 3    clopidogrel (PLAVIX) 75 MG tablet, Take 1 tablet by mouth daily, Disp: 30 tablet, Rfl: 3    amLODIPine (NORVASC) 10 MG tablet, Take 0.5 tablets by mouth daily, Disp: 30 tablet, Rfl: 3    atorvastatin (LIPITOR) 40 MG tablet, Take 1 tablet by mouth nightly, Disp: 30 tablet, Rfl: 3    metoprolol tartrate (LOPRESSOR) 50 MG tablet, Take 1 tablet by mouth 2 times daily, Disp: 60 tablet, Rfl: 3    pantoprazole (PROTONIX) 40 MG tablet, Take 1 tablet by mouth 2 times daily, Disp: 60 tablet, Rfl: 3    nitroGLYCERIN (NITROSTAT) 0.4 MG SL tablet, Place 1 tablet under the tongue every 5 minutes as needed for Chest pain up to max of 3 total doses.  If no relief after 1 dose, call 911., Disp: 25 tablet, Rfl: 3    ciprofloxacin (CIPRO) 750 MG tablet, Take 750 mg by mouth 2 times daily On 2 weeks, then off 1 week, repeat (for chronic lung infections), Disp: , Rfl:     albuterol-ipratropium (COMBIVENT RESPIMAT)  MCG/ACT AERS inhaler, Inhale 1 puff into the lungs every 6 hours as needed, Disp: 3 Inhaler, Rfl: 3    ropinirole (REQUIP) 0.25 MG tablet, Take 1 mg by mouth nightly., Disp: , Rfl:     Multiple Vitamins-Minerals (PRESERVISION AREDS PO), Take  by mouth daily. , Disp: , Rfl:     furosemide (LASIX) 40 MG tablet, Take 40 mg by mouth daily as needed (taking PRN) , Disp: , Rfl:     Past Medical History  Kristie Galvez  has a past medical history of Anxiety, Arthritis, Blood circulation, collateral, Bronchiectasis ([address] CAD (coronary artery disease), Chronic obstructive lung disease, Depression, Epigastric discomfort, Exertional dyspnea, GERD (gastroesophageal reflux disease), Hernia, History of blood transfusion, History of pneumonia, History of positive PPD, Irregular heart beat, Joint pain, Pacemaker, Pneumonia, Restless legs syndrome, and SSS (sick sinus syndrome) ([facility] [address] Social History  Kristie Galvez  reports that she has never smoked. She has never used smokeless tobacco. She reports current alcohol use. She reports that she does not use drugs. Family History  Kristie Galvez family history includes Cancer in her mother, sister, and sister; Other in her father. There is no family history of bicuspid aortic valve, aneurysms, heart transplant, pacemakers, defibrillators, or sudden cardiac death. Past Surgical History   Past Surgical History:   Procedure Laterality Date    APPENDECTOMY      BACK SURGERY      CARDIAC PACEMAKER PLACEMENT  2001    CARDIAC PACEMAKER PLACEMENT  4 13 2010    Dual-chamber pulse generator removal. Attempted extraction of atrial and ventricular leads. Insertion of new ventricular lead. Insertion of new dual-chamber pulse generator (atrial lead capped.)     CARDIOVASCULAR STRESS TEST  8 23 2010    Gated study showed normal LV function. EF is 64%. Iosotope scan in short axis showed homogeneous uptake. Mild anterior wall attenuation in breasts but no reversibility to suggest ischemia. TID was normal at 0.90 and ischemic score was 0. No ischemia or infarction.     regurgitation. There was mild mitral regurgitation. There was trace tricuspid regurgitation. Assuming RAP = 5 mmHg, the estimated RVSP = 39 mmHg. Ascending aorta = 2.7 cm. IVC size is within normal limits with normal respiratory phasic changes. Signature    ----------------------------------------------------------------  Electronically signed by Nohemi Morales MD (Interpreting  physician) on 02/07/2019 at 12:11 PM  ----------------------------------------------------------------    Findings    Mitral Valve  The mitral valve structure was normal with normal leaflet separation. DOPPLER: The transmitral velocity was within the normal range with no  evidence for mitral stenosis. There was mild mitral regurgitation. Aortic Valve  The aortic valve was trileaflet with increased thickness/calcification and  reduced cuspal separation. DOPPLER: Transaortic velocity was 2.0 m/s, mean  gradient of 16 mmHg, and estimated CHRISTI 1.2 cm2. There was no evidence of  aortic regurgitation. Tricuspid Valve  The tricuspid valve structure was normal with normal leaflet separation. DOPPLER: There was no evidence of tricuspid stenosis. There was trace  tricuspid regurgitation. Assuming RAP = 5 mmHg, the estimated RVSP = 39  mmHg. Pulmonic Valve  The pulmonic valve leaflets were not well seen. DOPPLER: The transpulmonic  velocity was within the normal range with no evidence for regurgitation. Left Atrium  Left atrial size was moderately dilated. Left Ventricle  Normal left ventricle size and systolic function. Ejection fraction was  estimated at 60-65%. There were no regional left ventricular wall motion  abnormalities and wall thickness was within normal limits. Doppler parameters were consistent with abnormal left ventricular  relaxation (grade 1 diastolic dysfunction).     Right Atrium  Right atrial size was normal.    Right Ventricle  The right ventricular size was normal with normal systolic function and  wall thickness. Device lead/catheter seen in the right heart. Pericardial Effusion  The pericardium was normal in appearance with no evidence of a pericardial  effusion. Pleural Effusion  No evidence of pleural effusion. Aorta / Great Vessels  -Ascending aorta = 2.7 cm. -IVC size is within normal limits with normal respiratory phasic changes.     M-Mode/2D Measurements & Calculations    LV Diastolic   LV Systolic Dimension:    AV Cusp Separation: 1.2 cmLA  Dimension: 4   2.5 cm                    Dimension: 3.7 cmAO Root  cm             LV Volume Diastolic: 70   Dimension: 2.7 cmLA Area: 20.9  LV FS:37.5 %   ml                        cm^2  LV PW          LV Volume Systolic: 88.6  Diastolic: 1.1 ml  cm             LV EDV/LV EDV Index: 70  Septum         ml/43 m^2LV ESV/LV ESV    RV Diastolic Dimension: 3.3 cm  Diastolic: 1.1 Index: 27.4 ml/14 m^2  cm             EF Calculated: 68.1 %     LA/Aorta: 1.37    LA volume/Index: 64 ml /40m^2    LVOT: 1.8 cm    Doppler Measurements & Calculations    MV Peak E-Wave: 80.5 AV Peak Velocity: 189   LVOT Peak Velocity: 100 cm/s  cm/s                 cm/s                    LVOT Mean Velocity: 75.2 cm/s  MV Peak A-Wave: 110  AV Peak Gradient: 14.29 LVOT Peak Gradient: 4  cm/s                 mmHg                    mmHgLVOT Mean Gradient: 3  MV E/A Ratio: 0.73   AV Mean Velocity: 120   mmHg  MV Peak Gradient:    cm/s  2.59 mmHg            AV Mean Gradient: 9     TV Peak E-Wave: 51.9 cm/s  MV Mean Gradient: 3  mmHg                    TV Peak A-Wave: 49.5 cm/s  mmHg                 AV VTI: 46.5 cm  MV Mean Velocity:    AV Area                 TV Peak Gradient: 1.08 mmHg  81.4 cm/s            (Continuity):1.21 cm^2  TR Velocity:291 cm/s  MV Deceleration                              TR Gradient:33.87 mmHg  Time: 296 msec       LVOT VTI: 22.1 cm       PV Peak Velocity: 72.8 cm/s  MV P1/2t: 100 msec   IVRT: 95 msec           PV Peak Gradient: 2.12 mmHg  MVA by PHT:2.2 cm^2  MV Area  (continuity): 1.73   AV DVI (VTI): 0.48AV  cm^2                 DVI (Vmax):0.53  MV E' Septal  Velocity: 5.9 cm/s  MV A' Septal  Velocity: 11.4 cm/s  MV E' Lateral  Velocity: 5.5 cm/s  MV A' Lateral  Velocity: 13.1 cm/s  E/E' septal: 13.64  E/E' lateral: 14.64  MR Velocity: 444  cm/s    [email or web address] [id number]       Assessment/Plan   MAJOR  Hx of moderate aortic stenosis 2019 5/2018 PCI of RCA 3.0 x 38 and LAD ISR s/p PTCA with 3.0 x 20 on Plavix  Afib/flutter on Eliquis  Preserved EF   S/p PM for SSS   She continues to have MAJOR and fatigue. Re-check TTE to follow valve. No volume on exam.  PM working appropriately. No bleeding. She is otherwise on OMT. Discussed diet/exercise/BP/weight loss/health lifestyle choices/lipids; the patient understands the goals and will try to comply.     Disposition:  1 year - if valve worse will need to return for further evaluation         Electronically signed by Yoandy Draper MD   6/21/2021 at 12:36 PM EDT

## 2021-06-21 NOTE — PROGRESS NOTES
Patient here for 1 year follow up. Patient c/o SOB frequently, heart fluttering at times, and cough. Denies weight gain or swelling.

## 2021-06-28 ENCOUNTER — HOSPITAL ENCOUNTER (OUTPATIENT)
Age: 86
Discharge: HOME | End: 2021-06-28
Payer: MEDICARE

## 2021-06-28 ENCOUNTER — HOSPITAL ENCOUNTER (OUTPATIENT)
Dept: NON INVASIVE DIAGNOSTICS | Age: 86
Discharge: HOME | End: 2021-06-28
Payer: MEDICARE

## 2021-06-28 DIAGNOSIS — I35.0 NONRHEUMATIC AORTIC VALVE STENOSIS: ICD-10-CM

## 2021-06-28 LAB
ALBUMIN SERPL-MCNC: 4.1 G/DL (ref 3.5–5.1)
ALP BLD-CCNC: 67 U/L (ref 38–126)
ALT SERPL-CCNC: 12 U/L (ref 11–66)
ANION GAP SERPL CALCULATED.3IONS-SCNC: 8 MEQ/L (ref 8–16)
AST SERPL-CCNC: 24 U/L (ref 5–40)
BASOPHILS # BLD: 1.2 %
BASOPHILS ABSOLUTE: 0.1 THOU/MM3 (ref 0–0.1)
BILIRUB SERPL-MCNC: 0.2 MG/DL (ref 0.3–1.2)
BUN BLDV-MCNC: 27 MG/DL (ref 7–22)
CALCIUM SERPL-MCNC: 9.3 MG/DL (ref 8.5–10.5)
CHLORIDE BLD-SCNC: 101 MEQ/L (ref 98–111)
CO2: 29 MEQ/L (ref 23–33)
CREAT SERPL-MCNC: 1.1 MG/DL (ref 0.4–1.2)
EOSINOPHIL # BLD: 2 %
EOSINOPHILS ABSOLUTE: 0.1 THOU/MM3 (ref 0–0.4)
ERYTHROCYTE [DISTWIDTH] IN BLOOD BY AUTOMATED COUNT: 13.8 % (ref 11.5–14.5)
ERYTHROCYTE [DISTWIDTH] IN BLOOD BY AUTOMATED COUNT: 48.9 FL (ref 35–45)
GFR SERPL CREATININE-BSD FRML MDRD: 47 ML/MIN/1.73M2
GLUCOSE BLD-MCNC: 121 MG/DL (ref 70–108)
HCT VFR BLD CALC: 36.9 % (ref 37–47)
HEMOGLOBIN: 11.6 GM/DL (ref 12–16)
IMMATURE GRANS (ABS): 0.02 THOU/MM3 (ref 0–0.07)
IMMATURE GRANULOCYTES: 0.3 %
LV EF: 53 %
LVEF MODALITY: NORMAL
LYMPHOCYTES # BLD: 24 %
LYMPHOCYTES ABSOLUTE: 1.7 THOU/MM3 (ref 1–4.8)
MCH RBC QN AUTO: 30.4 PG (ref 26–33)
MCHC RBC AUTO-ENTMCNC: 31.4 GM/DL (ref 32.2–35.5)
MCV RBC AUTO: 96.6 FL (ref 81–99)
MONOCYTES # BLD: 9.7 %
MONOCYTES ABSOLUTE: 0.7 THOU/MM3 (ref 0.4–1.3)
NUCLEATED RED BLOOD CELLS: 0 /100 WBC
PLATELET # BLD: 245 THOU/MM3 (ref 130–400)
PMV BLD AUTO: 9.7 FL (ref 9.4–12.4)
POTASSIUM SERPL-SCNC: 4.7 MEQ/L (ref 3.5–5.2)
RBC # BLD: 3.82 MILL/MM3 (ref 4.2–5.4)
SEG NEUTROPHILS: 62.8 %
SEGMENTED NEUTROPHILS ABSOLUTE COUNT: 4.3 THOU/MM3 (ref 1.8–7.7)
SODIUM BLD-SCNC: 138 MEQ/L (ref 135–145)
TOTAL PROTEIN: 7.1 G/DL (ref 6.1–8)
TSH SERPL DL<=0.05 MIU/L-ACNC: 1.17 UIU/ML (ref 0.4–4.2)
WBC # BLD: 6.9 THOU/MM3 (ref 4.8–10.8)

## 2021-06-28 PROCEDURE — 84443 ASSAY THYROID STIM HORMONE: CPT

## 2021-06-28 PROCEDURE — 36415 COLL VENOUS BLD VENIPUNCTURE: CPT

## 2021-06-28 PROCEDURE — 80053 COMPREHEN METABOLIC PANEL: CPT

## 2021-06-28 PROCEDURE — 93306 TTE W/DOPPLER COMPLETE: CPT

## 2021-06-28 PROCEDURE — 85025 COMPLETE CBC W/AUTO DIFF WBC: CPT

## 2021-09-23 NOTE — TELEPHONE ENCOUNTER
Jesus Beverly called requesting a refill on the following medications:  Requested Prescriptions     Pending Prescriptions Disp Refills    apixaban (ELIQUIS) 5 MG TABS tablet 180 tablet 3     Sig: TAKE 1 TABLET TWICE A DAY     Pharmacy verified: [facility]  . pv      Date of last visit: 6/21/2021  Date of next visit (if applicable): Visit date not found

## 2021-10-11 ENCOUNTER — PROCEDURE VISIT (OUTPATIENT)
Dept: CARDIOLOGY CLINIC | Age: 86
End: 2021-10-11
Payer: MEDICARE

## 2021-10-11 DIAGNOSIS — Z95.0 PACEMAKER: Primary | ICD-10-CM

## 2021-10-11 PROCEDURE — 93294 REM INTERROG EVL PM/LDLS PM: CPT | Performed by: INTERNAL MEDICINE

## 2021-10-11 PROCEDURE — 93296 REM INTERROG EVL PM/IDS: CPT | Performed by: INTERNAL MEDICINE

## 2021-10-11 NOTE — PROGRESS NOTES
DR CARBONE PT/ KNOWN AFIB/  ELIQUIS   AFIB BURDEN <1%  AMS EPISODES X 7 OR MODE SWITCH <1%    [facility] PJ DUAL PACEMAKER REMOTE   BATTERY 8.8-9.1 YRS REMAINING  ATRIAL IMPEDENCE 390  VENT IMPEDENCE 340  P WAVES 3.7  RV WAVES NOT OBTAINED PER THE DEVICE     ATRIAL THRESHOLD 0.5 @ 0.5  VENT THRESHOLD 0.5 @ 0.5    ATRIAL AND VENT AMPLITUDES AUTO       DDDR   A PACED 81%  V PACED >99%    9/29/21 PT HAD A MAGNET RESPONSE  9/30/21 PT HAD A MAGNET RESPONSE

## 2022-01-07 ENCOUNTER — PROCEDURE VISIT (OUTPATIENT)
Dept: CARDIOLOGY CLINIC | Age: 87
End: 2022-01-07

## 2022-01-07 DIAGNOSIS — Z95.0 PACEMAKER: Primary | ICD-10-CM

## 2022-01-07 NOTE — PROGRESS NOTES
Remote [facility] Dual Pacemaker   Patient of [facility]    Battery 8.9-9.2 years    Presenting rhythm AP     A Impedance 400  RV Impedance 390    P wave sensing 3.3  R wave sensing -    A Threshold 0.50 @ 0.50  RV Thresholds 0.625 @ 0.50      A Paced 86%  V Paced 99%    Programmed Mode DDDR       Afib Jay 0% // hx aflutter- taking eliquis    Episodes   12/14/21- 4 seconds a flutter  10/31/21- magnet response

## 2022-01-09 PROCEDURE — 93296 REM INTERROG EVL PM/IDS: CPT | Performed by: INTERNAL MEDICINE

## 2022-01-09 PROCEDURE — 93294 REM INTERROG EVL PM/LDLS PM: CPT | Performed by: INTERNAL MEDICINE

## 2022-01-11 ENCOUNTER — TELEPHONE (OUTPATIENT)
Dept: CARDIOLOGY CLINIC | Age: 87
End: 2022-01-11

## 2022-01-11 NOTE — TELEPHONE ENCOUNTER
Pre op Risk Assessment    Procedure direct eyebrow lift, therapeutic blepharoplasty  Physician Zi Johnson  Date of surgery/procedure     Last OV 6-21-21  Last Stress   Last Echo 6-28-21  Last Cath 5-12-18  Last Stent 5-12-18  Is patient on blood thinners Eliquis, plavix  Hold Meds/how many days 5    [phone] fax

## 2022-01-19 ENCOUNTER — APPOINTMENT (OUTPATIENT)
Dept: CT IMAGING | Age: 87
End: 2022-01-19
Payer: MEDICARE

## 2022-01-19 ENCOUNTER — HOSPITAL ENCOUNTER (EMERGENCY)
Age: 87
Discharge: HOME | End: 2022-01-19
Payer: MEDICARE

## 2022-01-19 ENCOUNTER — APPOINTMENT (OUTPATIENT)
Dept: GENERAL RADIOLOGY | Age: 87
End: 2022-01-19
Payer: MEDICARE

## 2022-01-19 VITALS
WEIGHT: 125 LBS | DIASTOLIC BLOOD PRESSURE: 71 MMHG | SYSTOLIC BLOOD PRESSURE: 130 MMHG | HEART RATE: 107 BPM | TEMPERATURE: 99.9 F | OXYGEN SATURATION: 93 % | BODY MASS INDEX: 21.34 KG/M2 | RESPIRATION RATE: 22 BRPM | HEIGHT: 64 IN

## 2022-01-19 DIAGNOSIS — J44.1 COPD EXACERBATION (HCC): Primary | ICD-10-CM

## 2022-01-19 LAB
ALBUMIN SERPL-MCNC: 4.2 G/DL (ref 3.5–5.1)
ALP BLD-CCNC: 53 U/L (ref 38–126)
ALT SERPL-CCNC: 8 U/L (ref 11–66)
ANION GAP SERPL CALCULATED.3IONS-SCNC: 9 MEQ/L (ref 8–16)
AST SERPL-CCNC: 18 U/L (ref 5–40)
BILIRUB SERPL-MCNC: 0.2 MG/DL (ref 0.3–1.2)
BUN BLDV-MCNC: 13 MG/DL (ref 7–22)
C-REACTIVE PROTEIN: < 0.3 MG/DL (ref 0–1)
CALCIUM SERPL-MCNC: 9.4 MG/DL (ref 8.5–10.5)
CHLORIDE BLD-SCNC: 102 MEQ/L (ref 98–111)
CO2: 27 MEQ/L (ref 23–33)
CREAT SERPL-MCNC: 1 MG/DL (ref 0.4–1.2)
EKG ATRIAL RATE: 89 BPM
EKG P AXIS: 89 DEGREES
EKG P-R INTERVAL: 186 MS
EKG Q-T INTERVAL: 394 MS
EKG QRS DURATION: 140 MS
EKG QTC CALCULATION (BAZETT): 479 MS
EKG R AXIS: -24 DEGREES
EKG T AXIS: 86 DEGREES
EKG VENTRICULAR RATE: 89 BPM
FERRITIN: 28 NG/ML (ref 10–291)
GFR SERPL CREATININE-BSD FRML MDRD: 53 ML/MIN/1.73M2
GLUCOSE BLD-MCNC: 103 MG/DL (ref 70–108)
LD: 284 U/L (ref 100–190)
MAGNESIUM: 1.8 MG/DL (ref 1.6–2.4)
OSMOLALITY CALCULATION: 276 MOSMOL/KG (ref 275–300)
POTASSIUM REFLEX MAGNESIUM: 4.3 MEQ/L (ref 3.5–5.2)
PRO-BNP: 2559 PG/ML (ref 0–1800)
PROCALCITONIN: 0.09 NG/ML (ref 0.01–0.09)
SARS-COV-2, NAAT: DETECTED
SODIUM BLD-SCNC: 138 MEQ/L (ref 135–145)
TOTAL PROTEIN: 7.1 G/DL (ref 6.1–8)
TROPONIN T: < 0.01 NG/ML

## 2022-01-19 PROCEDURE — 86140 C-REACTIVE PROTEIN: CPT

## 2022-01-19 PROCEDURE — 6370000000 HC RX 637 (ALT 250 FOR IP): Performed by: PHYSICIAN ASSISTANT

## 2022-01-19 PROCEDURE — 6360000002 HC RX W HCPCS: Performed by: PHYSICIAN ASSISTANT

## 2022-01-19 PROCEDURE — 36415 COLL VENOUS BLD VENIPUNCTURE: CPT

## 2022-01-19 PROCEDURE — 2580000003 HC RX 258: Performed by: PHYSICIAN ASSISTANT

## 2022-01-19 PROCEDURE — 93005 ELECTROCARDIOGRAM TRACING: CPT | Performed by: PHYSICIAN ASSISTANT

## 2022-01-19 PROCEDURE — 71045 X-RAY EXAM CHEST 1 VIEW: CPT

## 2022-01-19 PROCEDURE — 80053 COMPREHEN METABOLIC PANEL: CPT

## 2022-01-19 PROCEDURE — 93010 ELECTROCARDIOGRAM REPORT: CPT | Performed by: NUCLEAR MEDICINE

## 2022-01-19 PROCEDURE — 84484 ASSAY OF TROPONIN QUANT: CPT

## 2022-01-19 PROCEDURE — 87635 SARS-COV-2 COVID-19 AMP PRB: CPT

## 2022-01-19 PROCEDURE — 83615 LACTATE (LD) (LDH) ENZYME: CPT

## 2022-01-19 PROCEDURE — 99284 EMERGENCY DEPT VISIT MOD MDM: CPT

## 2022-01-19 PROCEDURE — 71275 CT ANGIOGRAPHY CHEST: CPT

## 2022-01-19 PROCEDURE — 94640 AIRWAY INHALATION TREATMENT: CPT

## 2022-01-19 PROCEDURE — 82728 ASSAY OF FERRITIN: CPT

## 2022-01-19 PROCEDURE — 83735 ASSAY OF MAGNESIUM: CPT

## 2022-01-19 PROCEDURE — 83880 ASSAY OF NATRIURETIC PEPTIDE: CPT

## 2022-01-19 PROCEDURE — 6360000004 HC RX CONTRAST MEDICATION: Performed by: PHYSICIAN ASSISTANT

## 2022-01-19 PROCEDURE — 96361 HYDRATE IV INFUSION ADD-ON: CPT

## 2022-01-19 PROCEDURE — 96374 THER/PROPH/DIAG INJ IV PUSH: CPT

## 2022-01-19 PROCEDURE — 84145 PROCALCITONIN (PCT): CPT

## 2022-01-19 RX ORDER — ZINC SULFATE 50(220)MG
50 CAPSULE ORAL DAILY
Qty: 7 CAPSULE | Refills: 0 | Status: SHIPPED | OUTPATIENT
Start: 2022-01-19 | End: 2022-01-26

## 2022-01-19 RX ORDER — ALBUTEROL SULFATE 90 UG/1
2 AEROSOL, METERED RESPIRATORY (INHALATION) 4 TIMES DAILY PRN
Qty: 18 G | Refills: 0 | Status: SHIPPED | OUTPATIENT
Start: 2022-01-19

## 2022-01-19 RX ORDER — ASCORBIC ACID 500 MG
500 TABLET ORAL 2 TIMES DAILY
Qty: 14 TABLET | Refills: 0 | Status: SHIPPED | OUTPATIENT
Start: 2022-01-19 | End: 2022-01-26

## 2022-01-19 RX ORDER — METHYLPREDNISOLONE SODIUM SUCCINATE 125 MG/2ML
125 INJECTION, POWDER, LYOPHILIZED, FOR SOLUTION INTRAMUSCULAR; INTRAVENOUS ONCE
Status: COMPLETED | OUTPATIENT
Start: 2022-01-19 | End: 2022-01-19

## 2022-01-19 RX ORDER — 0.9 % SODIUM CHLORIDE 0.9 %
1000 INTRAVENOUS SOLUTION INTRAVENOUS ONCE
Status: COMPLETED | OUTPATIENT
Start: 2022-01-19 | End: 2022-01-19

## 2022-01-19 RX ORDER — PREDNISONE 20 MG/1
20 TABLET ORAL DAILY
Qty: 10 TABLET | Refills: 0 | Status: SHIPPED | OUTPATIENT
Start: 2022-01-19 | End: 2022-01-29

## 2022-01-19 RX ORDER — IPRATROPIUM BROMIDE AND ALBUTEROL SULFATE 2.5; .5 MG/3ML; MG/3ML
1 SOLUTION RESPIRATORY (INHALATION) ONCE
Status: COMPLETED | OUTPATIENT
Start: 2022-01-19 | End: 2022-01-19

## 2022-01-19 RX ADMIN — IOPAMIDOL 80 ML: 755 INJECTION, SOLUTION INTRAVENOUS at 12:38

## 2022-01-19 RX ADMIN — SODIUM CHLORIDE 1000 ML: 9 INJECTION, SOLUTION INTRAVENOUS at 12:06

## 2022-01-19 RX ADMIN — METHYLPREDNISOLONE SODIUM SUCCINATE 125 MG: 125 INJECTION, POWDER, FOR SOLUTION INTRAMUSCULAR; INTRAVENOUS at 12:07

## 2022-01-19 RX ADMIN — IPRATROPIUM BROMIDE AND ALBUTEROL SULFATE 1 AMPULE: .5; 3 SOLUTION RESPIRATORY (INHALATION) at 12:13

## 2022-01-19 ASSESSMENT — PAIN DESCRIPTION - LOCATION: LOCATION: BACK

## 2022-01-19 ASSESSMENT — ENCOUNTER SYMPTOMS
EYE ITCHING: 0
BACK PAIN: 0
COLOR CHANGE: 0
RHINORRHEA: 0
NAUSEA: 0
EYE PAIN: 0
WHEEZING: 0
SHORTNESS OF BREATH: 1
DIARRHEA: 0
EYE DISCHARGE: 0
SORE THROAT: 0
VOMITING: 0
ABDOMINAL PAIN: 0
COUGH: 1

## 2022-01-19 ASSESSMENT — PAIN DESCRIPTION - PAIN TYPE: TYPE: ACUTE PAIN

## 2022-01-19 NOTE — ED NOTES
Pt up in bed with blankets over bottom half. Patient updated on plan of care at this time and expresses no concerns regarding treatment. Patient VSS. Respirations are regular and unlabored, patient is alert and oriented x4. Patient bed rails up x2 and call light within reach. Will continue to monitor.        Jessica Jarquin RN  01/19/22 8175

## 2022-01-19 NOTE — ED PROVIDER NOTES
[facility] [address] COMPLAINT       Chief Complaint   Patient presents with    Shortness of Breath    Back Pain       Nurses Notes reviewed and I agree except as notedin the HPI. HISTORY OF PRESENT ILLNESS    Mitra Salamacna is a 80 y.o. female who presents has been ill for the last day or so with cough and congestion. She also had some chest pressure onto her back. She been short of breath. She is fully vaccinated for COVID. She denies any dysuria any polyuria. She been nausea but has not had any vomiting or diarrhea. She has a history of COPD. Location/Symptom: SOB  Timing/Onset: last day  Context/Setting: home  Quality: none  Duration: constant  Modifying Factors: none  Severity: none    REVIEW OF SYSTEMS     Review of Systems   Constitutional: Negative for activity change, appetite change, chills and fever. HENT: Positive for congestion. Negative for ear pain, rhinorrhea and sore throat. Eyes: Negative for pain, discharge and itching. Respiratory: Positive for cough and shortness of breath. Negative for wheezing. Cardiovascular: Negative for chest pain. Gastrointestinal: Negative for abdominal pain, diarrhea, nausea and vomiting. Genitourinary: Negative for difficulty urinating and dysuria. Musculoskeletal: Negative for arthralgias, back pain and myalgias. Skin: Negative for color change and rash. Neurological: Negative for dizziness, seizures, light-headedness and headaches. Psychiatric/Behavioral: Negative for agitation, confusion, self-injury and suicidal ideas. All other systems reviewed and are negative.        PAST MEDICAL HISTORY    has a past medical history of Anxiety, Arthritis, Blood circulation, collateral, Bronchiectasis ([address] CAD (coronary artery disease), Chronic obstructive lung disease, Depression, Epigastric discomfort, Exertional dyspnea, GERD (gastroesophageal reflux disease), Hernia, History of blood transfusion, History of pneumonia, History of positive PPD, Irregular heart beat, Joint pain, Pacemaker, Pneumonia, Restless legs syndrome, and SSS (sick sinus syndrome) ([facility] [address] SURGICAL HISTORY      has a past surgical history that includes transthoracic echocardiogram (8 01 2011); cardiovascular stress test (8 23 2010); Cardiac pacemaker placement (2001); Cardiac pacemaker placement (4 13 2010); Diagnostic Cardiac Cath Lab Procedure (8 02 2006); Coronary angioplasty with stent (11/2011); pacemaker placement; Appendectomy; Cholecystectomy; Hysterectomy; Colonoscopy; Endoscopy, colon, diagnostic; back surgery; and joint replacement. CURRENT MEDICATIONS       Discharge Medication List as of 1/19/2022  2:20 PM      CONTINUE these medications which have NOT CHANGED    Details   apixaban (ELIQUIS) 5 MG TABS tablet TAKE 1 TABLET TWICE A DAY, Disp-180 tablet, R-2Normal      diclofenac sodium 1 % GEL Apply 4 g topically 4 times daily as needed for Pain, Topical, 4 TIMES DAILY PRN Starting Wed 7/3/2019, Disp-1 Tube, R-3, Normal      clopidogrel (PLAVIX) 75 MG tablet Take 1 tablet by mouth daily, Disp-30 tablet, R-3Normal      amLODIPine (NORVASC) 10 MG tablet Take 0.5 tablets by mouth daily, Disp-30 tablet, R-3Normal      atorvastatin (LIPITOR) 40 MG tablet Take 1 tablet by mouth nightly, Disp-30 tablet, R-3Normal      metoprolol tartrate (LOPRESSOR) 50 MG tablet Take 1 tablet by mouth 2 times daily, Disp-60 tablet, R-3Normal      pantoprazole (PROTONIX) 40 MG tablet Take 1 tablet by mouth 2 times daily, Disp-60 tablet, R-3Normal      nitroGLYCERIN (NITROSTAT) 0.4 MG SL tablet Place 1 tablet under the tongue every 5 minutes as needed for Chest pain up to max of 3 total doses.  If no relief after 1 dose, call 911., Disp-25 tablet, R-3Normal      ciprofloxacin (CIPRO) 750 MG tablet Take 750 mg by mouth 2 times daily On 2 weeks, then off 1 week, repeat (for chronic lung infections)Historical Med albuterol-ipratropium (COMBIVENT RESPIMAT)  MCG/ACT AERS inhaler Inhale 1 puff into the lungs every 6 hours as needed, Disp-3 Inhaler, R-3Normal      ropinirole (REQUIP) 0.25 MG tablet Take 1 mg by mouth nightly. Multiple Vitamins-Minerals (PRESERVISION AREDS PO) Take  by mouth daily. furosemide (LASIX) 40 MG tablet Take 40 mg by mouth daily as needed (taking PRN) Historical Med             ALLERGIES     is allergic to adhesive tape, eggs or egg-derived products, and quinine derivatives. HISTORY     She indicated that her mother is . She indicated that her father is . She indicated that both of her sisters are . family history includes Cancer in her mother, sister, and sister; Other in her father. SOCIALHISTORY      reports that she has never smoked. She has never used smokeless tobacco. She reports current alcohol use. She reports that she does not use drugs. PHYSICAL EXAM     INITIAL VITALS:  height is 5' 4\" (1.626 m) and weight is 125 lb (56.7 kg). Her oral temperature is 99.9 °F (37.7 °C). Her blood pressure is 130/71 and her pulse is 107. Her respiration is 22 and oxygen saturation is 93%. Physical Exam  Vitals and nursing note reviewed. Constitutional:       Comments: Well Developed Well Nourished Appearing     HENT:      Head: Normocephalic and atraumatic. Eyes:      Pupils: Pupils are equal, round, and reactive to light. Cardiovascular:      Rate and Rhythm: Regular rhythm. Tachycardia present. Heart sounds: Normal heart sounds. Pulmonary:      Effort: Pulmonary effort is normal. No respiratory distress. Breath sounds: Wheezing and rales present. Comments: Expiratory wheezes and rales throughout. Abdominal:      General: Bowel sounds are normal. There is no distension. Palpations: Abdomen is soft. Musculoskeletal:      Cervical back: Normal range of motion and neck supple.          DIFFERENTIAL DIAGNOSIS:   Wheezes and expiratory rales throughout. Possible COVID-pneumonia possible pneumonia. We will work her up and give some IV fluids. DIAGNOSTIC RESULTS     EKG: All EKG's are interpreted by the Emergency Department Physician who either signs or Co-signs this chart in the absence of a cardiologist.      RADIOLOGY: non-plain film images(s) such as CT, Ultrasound and MRI are read by the radiologist.  [address]   Final Result      1. No CT evidence of pulmonary embolus. 2. Bronchiolectasis in the right middle lobe, the lung bases and the lingula along with minimal areas of patchy opacities. Findings likely relate to an underlying infectious process in the right clinical setting. Findings can also relate to    bronchiolitis. 3. A 2.1 cm left thyroid nodule is seen. Thyroid ultrasound on a nonemergent basis. 4. A 7 mm right apical pulmonary nodule. A 5 mm right upper lobe pulmonary nodule. A repeat CT chest without contrast in 6 months is recommended. 5. Nondisplaced right fourth rib fracture. 6. Other incidental findings as described above. **This report has been created using voice recognition software. It may contain minor errors which are inherent in voice recognition technology. **      Final report electronically signed by Dr Yanna Zavala on 1/19/2022 1:20 PM      XR CHEST PORTABLE   Final Result   Small left pleural effusion has decreased in volume. Diffuse prominence of the interstitial pulmonary markings and blunting of the right CP angle is unchanged. **This report has been created using voice recognition software. It may contain minor errors which are inherent in voice recognition technology. **      Final report electronically signed by Dr Piyush Wiggins on 1/19/2022 11:24 AM            LABS:   Labs Reviewed   COVID-19, RAPID - Abnormal; Notable for the following components:       Result Value    SARS-CoV-2, NAAT DETECTED (*)     All other components within normal limits   COMPREHENSIVE METABOLIC PANEL W/ REFLEX TO MG FOR LOW K - Abnormal; Notable for the following components: Total Bilirubin 0.2 (*)     ALT 8 (*)     All other components within normal limits   BRAIN NATRIURETIC PEPTIDE - Abnormal; Notable for the following components:    Pro-BNP 2559.0 (*)     All other components within normal limits   GLOMERULAR FILTRATION RATE, ESTIMATED - Abnormal; Notable for the following components:    Est, Glom Filt Rate 53 (*)     All other components within normal limits   LACTATE DEHYDROGENASE - Abnormal; Notable for the following components:     (*)     All other components within normal limits   TROPONIN   ANION GAP   OSMOLALITY   MAGNESIUM   PROCALCITONIN   FERRITIN   C-REACTIVE PROTEIN       EMERGENCY DEPARTMENT COURSE:   :    Vitals:    01/19/22 1057 01/19/22 1211 01/19/22 1213 01/19/22 1346   BP: (!) 163/79 136/72  130/71   Pulse: 118 91  107   Resp: 24 24 25 22   Temp: 99.9 °F (37.7 °C)      TempSrc: Oral      SpO2: 93% 96% 94% 93%   Weight: 125 lb (56.7 kg)      Height: 5' 4\" (1.626 m)        Patient was seen history physical exam was performed. The patient was given a DuoNeb aerosol and Solu-Medrol-125 mg IV. The patient was reassessed after this. Her ambulatory pulse ox is 93%. She is feeling better. She wants to go home. I did staffed the case with Dr. Vi Cordova.   See disposition below    CRITICAL CARE:  None    CONSULTS:  None    PROCEDURES:  None    FINAL IMPRESSION      1. [facility]          DISPOSITION/PLAN   Discharge    PATIENT REFERRED TO:  Fermin Tineo MD  [address] [facility] [address]  [phone]    In 2 days        DISCHARGE MEDICATIONS:  Discharge Medication List as of 1/19/2022  2:20 PM      START taking these medications    Details   predniSONE (DELTASONE) 20 MG tablet Take 1 tablet by mouth daily for 10 days, Disp-10 tablet, R-0Print      albuterol sulfate HFA (VENTOLIN HFA) 108 (90 Base) MCG/ACT inhaler Inhale 2 puffs into the lungs 4 times daily as needed for Wheezing, Disp-18 g, R-0Print      Spacer/Aero-Holding Chambers JANET DAILY PRN Starting Wed 1/19/2022, Disp-1 each, R-0, Print      ascorbic acid (VITAMIN C) 500 MG tablet Take 1 tablet by mouth 2 times daily for 7 days, Disp-14 tablet, R-0Print      zinc sulfate (ZINCATE) 220 (50 Zn) MG capsule Take 1 capsule by mouth daily for 7 days, Disp-7 capsule, R-0Print             (Please note that portions of this note were completed with a voice recognitionprogram.  Efforts were made to edit the dictations but occasionally words are mis-transcribed.)    Mary Peters, [address] TANISHA Akbar  01/19/22 2015

## 2022-01-19 NOTE — ED NOTES
ED nurse-to-nurse bedside report    Chief Complaint   Patient presents with    Shortness of Breath    Back Pain      LOC: alert and orientated to name, place, date  Vital signs   Vitals:    01/19/22 1057 01/19/22 1211 01/19/22 1213   BP: (!) 163/79 136/72    Pulse: 118 91    Resp: 24 24 25   Temp: 99.9 °F (37.7 °C)     TempSrc: Oral     SpO2: 93% 96% 94%   Weight: 125 lb (56.7 kg)     Height: 5' 4\" (1.626 m)        Pain:    Pain Interventions: NA  Pain Goal: NA  Oxygen: No    Current needs required RA   Telemetry: Yes  LDAs:   Peripheral IV 01/19/22 Right Forearm (Active)   Site Assessment Clean;Dry; Intact 01/19/22 1206   Line Status Blood return noted;Normal saline locked; Flushed 01/19/22 1206   Dressing Status Clean;Dry; Intact 01/19/22 1206     Continuous Infusions:   Mobility: Requires assistance * 1  Petty Fall Risk Score: No flowsheet data found.   Fall Interventions: side rails and call light  Report given to: Kendrick Corona RN  01/19/22 8858

## 2022-01-19 NOTE — ED TRIAGE NOTES
Pt presents to ED with complaints of SOB, abdominal pain, and back pain since last night. Pt states that her cough has gotten worse over the last couple days. Pt states she just hasn't felt good.

## 2022-01-20 ENCOUNTER — CARE COORDINATION (OUTPATIENT)
Dept: CARE COORDINATION | Age: 87
End: 2022-01-20

## 2022-01-20 NOTE — CARE COORDINATION
Ambulatory Care Coordination ED COVID Follow up Call    Challenges to be reviewed by the provider   Additional needs identified to be addressed with provider: No  none                 Encounter was not routed to provider for escalation. Method of communication with provider: none    Discussed COVID-19 related testing which was: available at this time. Test results were: positive. Patient informed of results, if available? Yes. Current Symptoms: fatigue, cough and shortness of breath    Reviewed New or Changed Meds: yes    Do you have what you need at home?  Durable Medical Equipment ordered at discharge: None   Home Health/Outpatient orders at discharge: none   Was patient discharged with a pulse oximeter? No Discussed and confirmed pulse oximeter discharge instructions and when to notify provider or seek emergency care. Patient education provided: Reviewed appropriate site of care based on symptoms and resources available to patient including: After hours contact [phone], Urgent care clinics and When to call 911. Follow up appointment recommended: yes. If no appointment scheduled, scheduling offered: yes  Future Appointments   Date Time Provider Twan Almazan   6/6/2022 11:00 AM SCHEDULE, YASMIN PACER NURSE N SRPX PACER [facility] - [facility] LUCINDA ALEMAN II.VIERTEL   6/6/2022 11:45 AM Susie Lagos MD N SRPX Heart [facility]       Interventions: Scheduled appointment with PCP-Patient to call provider to set up appointment  Obtained and reviewed discharge summary and/or continuity of care documents  Reviewed discharge instructions, medical action plan and red flags with patient who verbalized understanding. Plan for follow-up call in 5-7 days based on severity of symptoms and risk factors. Plan for next call: symptom management-eview SX  follow up appointment-Patient will schedule with PCP  medication management-review medications  Provided contact information for future needs.     Mary Anne Fairchild RN

## 2022-01-26 ENCOUNTER — CARE COORDINATION (OUTPATIENT)
Dept: CARE COORDINATION | Age: 87
End: 2022-01-26

## 2022-01-26 NOTE — CARE COORDINATION
Follow Up Call    Challenges to be reviewed by the provider   Additional needs identified to be addressed with provider: No  none                 Encounter was not routed to provider for escalation. Method of communication with provider:  none. Contacted the patient by telephone to follow up after ED. Status: significantly improved  Interventions to address identified needs: Scheduled appointment with PCP-Patient will call PCP for F/U appointment  Obtained and reviewed discharge summary and/or continuity of care [facility] follow up appointment(s):   Future Appointments   Date Time Provider Twan Almazan   6/6/2022 11:00 AM SCHEDULE, SRPS PACER NURSE N SRPX PACER [facility]   6/6/2022 11:45 AM Akil Carlisle MD N SRPX Heart [facility]     Non-[facility] follow up appointment(s):    Follow up appointment completed? No.    Provided contact information for future needs. No further follow-up call indicated based on severity of symptoms and risk factors.   Plan for next call: none    Bonnie Abbott RN

## 2022-04-18 ENCOUNTER — PROCEDURE VISIT (OUTPATIENT)
Dept: CARDIOLOGY CLINIC | Age: 87
End: 2022-04-18
Payer: MEDICARE

## 2022-04-18 DIAGNOSIS — Z95.0 PACEMAKER: Primary | ICD-10-CM

## 2022-04-18 PROCEDURE — 93296 REM INTERROG EVL PM/IDS: CPT | Performed by: INTERNAL MEDICINE

## 2022-04-18 PROCEDURE — 93294 REM INTERROG EVL PM/LDLS PM: CPT | Performed by: INTERNAL MEDICINE

## 2022-04-18 NOTE — PROGRESS NOTES
Remote [employer] Aarno Dual Pacemaker --  Patient of [employer]    Battery 9.3-9.5 years    Presenting rhythm AS     A Impedance 430  RV Impedance 390    P wave sensing 3.8  R wave sensing -    A Threshold 0.5 @ 0.50  RV Thresholds 0.625 @ 0.50      A Paced 16%  V Paced >99%    Programmed Mode DDDR     Hx PAF - taking eliquis  Afib [place] <1%    Episodes   4/1/22 - magnet responses  Mode switches - afib

## 2022-07-13 ENCOUNTER — NURSE ONLY (OUTPATIENT)
Dept: CARDIOLOGY CLINIC | Age: 87
End: 2022-07-13

## 2022-07-13 ENCOUNTER — OFFICE VISIT (OUTPATIENT)
Dept: CARDIOLOGY CLINIC | Age: 87
End: 2022-07-13
Payer: MEDICARE

## 2022-07-13 VITALS
HEART RATE: 84 BPM | HEIGHT: 64 IN | DIASTOLIC BLOOD PRESSURE: 72 MMHG | WEIGHT: 115 LBS | BODY MASS INDEX: 19.63 KG/M2 | SYSTOLIC BLOOD PRESSURE: 146 MMHG

## 2022-07-13 DIAGNOSIS — M79.604 LEG PAIN, BILATERAL: Primary | ICD-10-CM

## 2022-07-13 DIAGNOSIS — M79.605 LEG PAIN, BILATERAL: Primary | ICD-10-CM

## 2022-07-13 DIAGNOSIS — Z95.0 PACEMAKER: Primary | ICD-10-CM

## 2022-07-13 DIAGNOSIS — R09.89 OTHER SPECIFIED SYMPTOMS AND SIGNS INVOLVING THE CIRCULATORY AND RESPIRATORY SYSTEMS: ICD-10-CM

## 2022-07-13 DIAGNOSIS — I48.92 ATRIAL FLUTTER, PAROXYSMAL (HCC): ICD-10-CM

## 2022-07-13 PROCEDURE — 4004F PT TOBACCO SCREEN RCVD TLK: CPT | Performed by: INTERNAL MEDICINE

## 2022-07-13 PROCEDURE — 1123F ACP DISCUSS/DSCN MKR DOCD: CPT | Performed by: INTERNAL MEDICINE

## 2022-07-13 PROCEDURE — 1090F PRES/ABSN URINE INCON ASSESS: CPT | Performed by: INTERNAL MEDICINE

## 2022-07-13 PROCEDURE — G8427 DOCREV CUR MEDS BY ELIG CLIN: HCPCS | Performed by: INTERNAL MEDICINE

## 2022-07-13 PROCEDURE — G8420 CALC BMI NORM PARAMETERS: HCPCS | Performed by: INTERNAL MEDICINE

## 2022-07-13 PROCEDURE — 99213 OFFICE O/P EST LOW 20 MIN: CPT | Performed by: INTERNAL MEDICINE

## 2022-07-13 NOTE — PROGRESS NOTES
St rolando dual pacer in office  [facility] today     . Gabby Hicks Battery longevity:  9.4-9.6 years on device   Presenting rhythm  AS     Atrial impedance 450  RV impedance 390    P wave sensing 4.3  R wave sensing none, she's dependent     29 % atrial paced  100 % RV paced     Atrial threshold 0.5 V  at 0.5ms  RV threshold 0.5 V at 0.5ms  Mode switches   24/eliquis

## 2022-07-13 NOTE — PROGRESS NOTES
[id number] \[facility]\"" [address]  [place] [address]  Dept: [phone]  Dept Fax: [phone]  Loc: [phone]    Visit Date: 7/13/2022    Ms. Suzanne Medel is a 80 y.o. female  who presented for:  Chief Complaint   Patient presents with    1 Year Follow Up       HPI:   HPI   79 yo F hx of SSS s/p PM, Aflutter, PCI 2018 RCA and LAD, on Plavix/Eliquis who presents for follow-up. No chest pain, angina, MAJOR, orthopnea, PND, sob at rest, palpitations, LE edema, or syncope. Can do all ADLS. No bleeding. She complains of LE discomfort from time to time. No claudication. No ulcers or wounds. She has some anxiety issues as well. No leg color changes. Current Outpatient Medications:     albuterol sulfate HFA (VENTOLIN HFA) 108 (90 Base) MCG/ACT inhaler, Inhale 2 puffs into the lungs 4 times daily as needed for Wheezing, Disp: 18 g, Rfl: 0    Spacer/Aero-Holding Chambers JANET, 1 Device by Does not apply route daily as needed (spacer), Disp: 1 each, Rfl: 0    apixaban (ELIQUIS) 5 MG TABS tablet, TAKE 1 TABLET TWICE A DAY, Disp: 180 tablet, Rfl: 2    diclofenac sodium 1 % GEL, Apply 4 g topically 4 times daily as needed for Pain, Disp: 1 Tube, Rfl: 3    clopidogrel (PLAVIX) 75 MG tablet, Take 1 tablet by mouth daily, Disp: 30 tablet, Rfl: 3    amLODIPine (NORVASC) 10 MG tablet, Take 0.5 tablets by mouth daily, Disp: 30 tablet, Rfl: 3    atorvastatin (LIPITOR) 40 MG tablet, Take 1 tablet by mouth nightly, Disp: 30 tablet, Rfl: 3    metoprolol tartrate (LOPRESSOR) 50 MG tablet, Take 1 tablet by mouth 2 times daily, Disp: 60 tablet, Rfl: 3    pantoprazole (PROTONIX) 40 MG tablet, Take 1 tablet by mouth 2 times daily, Disp: 60 tablet, Rfl: 3    nitroGLYCERIN (NITROSTAT) 0.4 MG SL tablet, Place 1 tablet under the tongue every 5 minutes as needed for Chest pain up to max of 3 total doses.  If no relief after 1 dose, call 911., Disp: 25 tablet, Rfl: 3    ciprofloxacin (CIPRO) 750 MG tablet, Take 750 mg by mouth 2 times daily On 2 weeks, then off 1 week, repeat (for chronic lung infections), Disp: , Rfl:     albuterol-ipratropium (COMBIVENT RESPIMAT)  MCG/ACT AERS inhaler, Inhale 1 puff into the lungs every 6 hours as needed, Disp: 3 Inhaler, Rfl: 3    ropinirole (REQUIP) 0.25 MG tablet, Take 1 mg by mouth nightly., Disp: , Rfl:     Multiple Vitamins-Minerals (PRESERVISION AREDS PO), Take  by mouth daily. , Disp: , Rfl:     furosemide (LASIX) 40 MG tablet, Take 40 mg by mouth daily as needed (taking PRN) , Disp: , Rfl:     ascorbic acid (VITAMIN C) 500 MG tablet, Take 1 tablet by mouth 2 times daily for 7 days, Disp: 14 tablet, Rfl: 0    zinc sulfate (ZINCATE) 220 (50 Zn) MG capsule, Take 1 capsule by mouth daily for 7 days, Disp: 7 capsule, Rfl: 0    Past Medical History  Jerry López  has a past medical history of Anxiety, Arthritis, Blood circulation, collateral, Bronchiectasis ([address] CAD (coronary artery disease), Chronic obstructive lung disease, Depression, Epigastric discomfort, Exertional dyspnea, GERD (gastroesophageal reflux disease), Hernia, History of blood transfusion, History of pneumonia, History of positive PPD, Irregular heart beat, Joint pain, Pacemaker, Pneumonia, Restless legs syndrome, and SSS (sick sinus syndrome) ([address] Social History  Jerry López  reports that she has never smoked. She has never used smokeless tobacco. She reports current alcohol use. She reports that she does not use drugs. Family History  Jerry López family history includes Cancer in her mother, sister, and sister; Other in her father. There is no family history of bicuspid aortic valve, aneurysms, heart transplant, pacemakers, defibrillators, or sudden cardiac death.       Past Surgical History   Past Surgical History:   Procedure Laterality Date    APPENDECTOMY      BACK SURGERY      CARDIAC PACEMAKER PLACEMENT  2001    CARDIAC PACEMAKER PLACEMENT  4 13 2010    Dual-chamber pulse generator removal. Attempted extraction of atrial and ventricular leads. Insertion of new ventricular lead. Insertion of new dual-chamber pulse generator (atrial lead capped.)     CARDIOVASCULAR STRESS TEST  8 23 2010    Gated study showed normal LV function. EF is 64%. Iosotope scan in short axis showed homogeneous uptake. Mild anterior wall attenuation in breasts but no reversibility to suggest ischemia. TID was normal at 0.90 and ischemic score was 0. No ischemia or infarction.  CHOLECYSTECTOMY      COLONOSCOPY      CORONARY ANGIOPLASTY WITH STENT PLACEMENT  11/2011    Dr. Mason Cardoso LAB PROCEDURE  8 02 2006    It is right dominant coronary supply. LV was performed. The EF estimated is 60% with mild inferior wall hypokinesis. LV pressure is 139/6 mmHg, LVEDP 23 mmHg, aortic pressure of 138/71 mmHg. There was no gradient across the aortic valve. There was a suggestion of increased wall thickness of the ventricle.  ENDOSCOPY, COLON, DIAGNOSTIC      HYSTERECTOMY      JOINT REPLACEMENT      left knee    PACEMAKER PLACEMENT      TRANSTHORACIC ECHOCARDIOGRAM  8 01 2011    Size was normal. Systolic function was normal. EF was estimated in the range of 55-65%. There were no regional wall motion abnormalities. Wall thickness was normal. Doppler - LV diastolic function parameters were normal. RV was mildly dilated. There was mild mitral valve annular calcification and mild MR. Review of Systems   Constitutional: Negative for chills and fever  HENT: Negative for congestion, sinus pressure, sneezing and sore throat. Eyes: Negative for pain, discharge, redness and itching. Respiratory: Negative for apnea, cough  Gastrointestinal: Negative for blood in stool, constipation, diarrhea   Endocrine: Negative for cold intolerance, heat intolerance, polydipsia. Genitourinary: Negative for dysuria, enuresis, flank pain and hematuria. 01/19/2022 11:08 AM    K 4.3 01/19/2022 11:08 AM     01/19/2022 11:08 AM    CO2 27 01/19/2022 11:08 AM    BUN 13 01/19/2022 11:08 AM    LABALBU 4.2 01/19/2022 11:08 AM    LABALBU 4.5 11/30/2011 08:02 AM    CREATININE 1.0 01/19/2022 11:08 AM    CALCIUM 9.4 01/19/2022 11:08 AM    LABGLOM 53 01/19/2022 11:08 AM    GLUCOSE 103 01/19/2022 11:08 AM    GLUCOSE 109 12/01/2011 05:16 AM       Lab Results   Component Value Date/Time    ALKPHOS 53 01/19/2022 11:08 AM    ALT 8 01/19/2022 11:08 AM    AST 18 01/19/2022 11:08 AM    PROT 7.1 01/19/2022 11:08 AM    BILITOT 0.2 01/19/2022 11:08 AM    BILIDIR <0.2 06/03/2016 08:27 AM    LABALBU 4.2 01/19/2022 11:08 AM    LABALBU 4.5 11/30/2011 08:02 AM       Lab Results   Component Value Date/Time    MG 1.8 01/19/2022 11:08 AM       Lab Results   Component Value Date    INR 1.17 (H) 02/20/2019    PROTIME 1.20 (H) 07/20/2011         No results found for: LABA1C    Lab Results   Component Value Date/Time    TRIG 146 06/03/2016 08:27 AM    HDL 97 06/03/2016 08:27 AM    LDLCALC 100 06/03/2016 08:27 AM       Lab Results   Component Value Date/Time    TSH 1.170 06/28/2021 01:07 PM         Testing Reviewed:      I have individually reviewed the cardiac test below:    ECHO: Results for orders placed during the hospital encounter of 06/28/21    Echo 2D w doppler w color complete    Narrative  Transthoracic Echocardiography Report (TTE)    Demographics    Patient Name    Carmella Laird Gender                Female    MR #            [id number]      Race                      Ethnicity    Account #       [de-identified]      Room Number    Accession       [id number]      Date of Study         06/28/2021  Number    Date of Birth   1935     Referring Physician   Dario Ireland MD    Age             80 year(s)     Sonographer           Maria D Vuong MD  Physician    Procedure    Type of Study    TTE procedure:ECHOCARDIOGRAM COMPLETE 2D W DOPPLER W COLOR. Procedure Date  Date: 06/28/2021 Start: 01:46 PM    Study Location: Echo Lab  Technical Quality: Adequate visualization    Indications: Aortic stenosis. Additional Medical History:Pacemaker, COPD, GERD, NSTEMI, dyspnea on  exertion, fatigue    Patient Status: Routine    Height: 63 inches Weight: 120 pounds BSA: 1.56 m^2 BMI: 21.26 kg/m^2    BP: 130/60 mmHg    Allergies  - See Epic. - Other allergy:(Quinine). Conclusions    Summary  Normal left ventricular size and systolic function. There were no regional wall motion abnormalities. Wall thickness was within normal limits. Ejection fraction was estimated at 50-55%. Doppler parameters were consistent with abnormal left ventricular  relaxation (grade 1 diastolic dysfunction). Device lead seen in the right heart. There was trace mitral regurgitation. There was mild tricuspid regurgitation. Assuming RAP = 5 mmHg, the estimated RVSP = 34 mmHg. IVC size is within normal limits with normal respiratory phasic changes. Signature    ----------------------------------------------------------------  Electronically signed by Corrina Klein MD (Interpreting  physician) on 06/28/2021 at 04:53 PM  ----------------------------------------------------------------    Findings    Mitral Valve  The mitral valve structure demonstrated severe MAC. DOPPLER: The  transmitral velocity was within the normal range with no evidence for  mitral stenosis. There was trace mitral regurgitation. Aortic Valve  The aortic valve was trileaflet with increased thickness and reduced  cuspal separation. DOPPLER: Transaortic velocity was within the normal  range with no evidence of aortic stenosis. There was no evidence of aortic  regurgitation. Tricuspid Valve  The tricuspid valve structure was normal with normal leaflet separation. DOPPLER: There was no evidence of tricuspid stenosis.  There was mild  tricuspid regurgitation. Assuming RAP = 5 mmHg, the estimated RVSP = 34  mmHg. Pulmonic Valve  The pulmonic valve leaflets were not well seen. DOPPLER: The transpulmonic  velocity was within the normal range with no evidence for regurgitation. Left Atrium  Left atrial size was normal.    Left Ventricle  Normal left ventricular size and systolic function. There were no regional wall motion abnormalities. Wall thickness was within normal limits. Ejection fraction was estimated at 50-55%. Doppler parameters were consistent with abnormal left ventricular  relaxation (grade 1 diastolic dysfunction). Right Atrium  Right atrial size was normal.    Right Ventricle  The right ventricular size was normal with normal systolic function and  wall thickness. Device lead seen in the right heart. Pericardial Effusion  The pericardium was normal in appearance with no evidence of a pericardial  effusion. Pleural Effusion  No evidence of pleural effusion. Aorta / Great Vessels  IVC size is within normal limits with normal respiratory phasic changes.     M-Mode/2D Measurements & Calculations    LV Diastolic    LV Systolic Dimension:    AV Cusp Separation: 1.2 cmLA  Dimension: 3.6  2.6 cm                    Dimension: 4.3 cmAO Root  cm              LV Volume Diastolic: 75.2 Dimension: 3 cmLA Area: 13 cm^2  LV FS:27.8 %    ml  LV PW           LV Volume Systolic: 80.8  Diastolic: 0.9  ml  cm              LV EDV/LV EDV Index: 31.6 RV Diastolic Dimension: 2.4 cm  Septum          ml/35 m^2LV ESV/LV ESV  Diastolic: 1 cm Index: 01.4 ml/16 m^2     LA/Aorta: 1.43  EF Calculated: 54.8 %  LA volume/Index: 31.8 ml /20m^2    LVOT: 1.8 cm    Doppler Measurements & Calculations    MV Peak E-Wave:     AV Peak Velocity: 202    LVOT Peak Velocity: 94.6 cm/s  77.1 cm/s           cm/s                     LVOT Mean Velocity: 81.2 cm/s  MV Peak A-Wave: 121 AV Peak Gradient: 16.32  LVOT Peak Gradient: 4  cm/s                mmHg mmHgLVOT Mean Gradient: 3  MV E/A Ratio: 0.64  AV Mean Velocity: 149    mmHg  MV Peak Gradient:   cm/s  2.38 mmHg           AV Mean Gradient: 12     TV Peak E-Wave: 51 cm/s  mmHg                     TV Peak A-Wave: 49.3 cm/s  MV Deceleration     AV VTI: 53.1 cm  Time: 377 msec      AV Area                  TV Peak Gradient: 1.04 mmHg  MV P1/2t: 110 msec  (Continuity):1.16 cm^2   TR Velocity:239 cm/s  MVA by PHT:2 cm^2                            TR Gradient:22.85 mmHg  LVOT VTI: 24.3 cm        PV Peak Velocity: 70.7 cm/s  MV E' Septal                                 PV Peak Gradient: 2 mmHg  Velocity: 5 cm/s  MV A' Septal  Velocity: 8.6 cm/s  AV DVI (VTI): 0.46AV DVI  MV E' Lateral       (Vmax):0.47  Velocity: 6.2 cm/s  MV A' Lateral  Velocity: 10.8 cm/s  E/E' septal: 15.42  E/E' lateral: 12.44  MR Velocity: 396  cm/s    [id number]%[id number]       Assessment/Plan   Hx of moderate aortic stenosis 2019 5/2018 PCI of RCA 3.0 x 38 and LAD ISR s/p PTCA with 3.0 x 20 on Plavix  Afib/flutter on Eliquis  Preserved EF   S/p PM for SSS   Non-specific leg discomfort  Right knee replacement  Check CHANA, continue all other meds. PM check shows no major issues. BP mildly elevated, will continue to follow. No swelling or CHF. Denies sob or functional limitation. Discussed diet/exercise/BP/weight loss/health lifestyle choices/lipids; the patient understands the goals and will try to comply.     Disposition:  1 year         Electronically signed by Andriy Nayak MD   7/13/2022 at 11:45 AM EDT

## 2022-07-26 ENCOUNTER — HOSPITAL ENCOUNTER (OUTPATIENT)
Dept: INTERVENTIONAL RADIOLOGY/VASCULAR | Age: 87
Discharge: HOME | End: 2022-07-26
Payer: MEDICARE

## 2022-07-26 DIAGNOSIS — M79.604 LEG PAIN, BILATERAL: ICD-10-CM

## 2022-07-26 DIAGNOSIS — R09.89 OTHER SPECIFIED SYMPTOMS AND SIGNS INVOLVING THE CIRCULATORY AND RESPIRATORY SYSTEMS: ICD-10-CM

## 2022-07-26 DIAGNOSIS — M79.605 LEG PAIN, BILATERAL: ICD-10-CM

## 2022-07-26 PROCEDURE — 93922 UPR/L XTREMITY ART 2 LEVELS: CPT

## 2022-09-16 PROCEDURE — 93296 REM INTERROG EVL PM/IDS: CPT | Performed by: INTERNAL MEDICINE

## 2022-09-16 PROCEDURE — 93294 REM INTERROG EVL PM/LDLS PM: CPT | Performed by: INTERNAL MEDICINE

## 2022-09-30 ENCOUNTER — PROCEDURE VISIT (OUTPATIENT)
Dept: CARDIOLOGY CLINIC | Age: 87
End: 2022-09-30
Payer: MEDICARE

## 2022-09-30 DIAGNOSIS — Z95.0 PACEMAKER: Primary | ICD-10-CM

## 2022-09-30 NOTE — PROGRESS NOTES
Remote St Aaron Dual Pacemaker --from [employer] at home  Patient of [employer] 5.6 years    Presenting rhythm AS     A Impedance 430  RV Impedance 340    P wave sensing 4.5  R wave sensing -    A Threshold 0.50 @ 0.50  RV Thresholds 0.625 @ 0.50      A Paced 21%  V Paced >99%    Programmed Mode DDDR       Afib [place] <1%  Hx aflutter- taking eliquis     Episodes   Mode switches - aflutter

## 2022-12-09 ENCOUNTER — HOSPITAL ENCOUNTER (OUTPATIENT)
Dept: CT IMAGING | Age: 87
Discharge: HOME | End: 2022-12-09
Payer: MEDICARE

## 2022-12-09 DIAGNOSIS — I10 ESSENTIAL HYPERTENSION, MALIGNANT: ICD-10-CM

## 2022-12-09 DIAGNOSIS — R91.1 NODULE OF RIGHT LUNG: ICD-10-CM

## 2022-12-09 PROCEDURE — 71250 CT THORAX DX C-: CPT

## 2022-12-23 PROCEDURE — 93296 REM INTERROG EVL PM/IDS: CPT | Performed by: INTERNAL MEDICINE

## 2022-12-23 PROCEDURE — 93294 REM INTERROG EVL PM/LDLS PM: CPT | Performed by: INTERNAL MEDICINE

## 2022-12-27 ENCOUNTER — PROCEDURE VISIT (OUTPATIENT)
Dept: CARDIOLOGY CLINIC | Age: 87
End: 2022-12-27
Payer: MEDICARE

## 2022-12-27 DIAGNOSIS — Z95.0 PACEMAKER: Primary | ICD-10-CM

## 2022-12-27 NOTE — PROGRESS NOTES
Merlin st rolando dual pacer       . [facility] Battery longevity:  5.4 years   Presenting rhythm  AS     Atrial impedance 430  RV impedance 340    P wave sensing 4.1  R wave sensing 2.5    8.2 % atrial paced  >99 % RV paced     Atrial threshold 0.5 V  at 0.5ms  RV threshold 0.625 V at 0.5ms  Mode switches   eliquis

## 2023-03-31 ENCOUNTER — PROCEDURE VISIT (OUTPATIENT)
Dept: CARDIOLOGY CLINIC | Age: 88
End: 2023-03-31
Payer: MEDICARE

## 2023-03-31 DIAGNOSIS — Z95.0 PACEMAKER: Primary | ICD-10-CM

## 2023-03-31 PROCEDURE — 93295 DEV INTERROG REMOTE 1/2/MLT: CPT | Performed by: INTERNAL MEDICINE

## 2023-03-31 PROCEDURE — 93296 REM INTERROG EVL PM/IDS: CPT | Performed by: INTERNAL MEDICINE

## 2023-03-31 NOTE — PROGRESS NOTES
Dr Blaire Deal pt   Merlin st Jaun Lange dual pacer remote    Battery 5.1 yrs remaining    Dddr     A paced 17%  V paced >99%    P waves 4.7  Rv waves 5.9    Atrial impedence 430  Vent impedence 380    Atrial threshold 0.5 @ 0.5  Vent threshold 0.625 @ 0.5    Atrial and vent amplitudes auto   Known afib/ eliquis  Afib burden <1%  Ams episodes x 7%  Mode switch <1

## 2023-07-06 ENCOUNTER — OFFICE VISIT (OUTPATIENT)
Dept: CARDIOLOGY CLINIC | Age: 88
End: 2023-07-06
Payer: MEDICARE

## 2023-07-06 ENCOUNTER — NURSE ONLY (OUTPATIENT)
Dept: CARDIOLOGY CLINIC | Age: 88
End: 2023-07-06

## 2023-07-06 VITALS
HEIGHT: 63 IN | SYSTOLIC BLOOD PRESSURE: 144 MMHG | HEART RATE: 81 BPM | WEIGHT: 115.2 LBS | DIASTOLIC BLOOD PRESSURE: 62 MMHG | BODY MASS INDEX: 20.41 KG/M2

## 2023-07-06 DIAGNOSIS — Z95.0 PACEMAKER: Primary | ICD-10-CM

## 2023-07-06 DIAGNOSIS — R07.9 CHEST PAIN, UNSPECIFIED TYPE: Primary | ICD-10-CM

## 2023-07-06 DIAGNOSIS — R06.09 DOE (DYSPNEA ON EXERTION): ICD-10-CM

## 2023-07-06 DIAGNOSIS — Z95.0 PACEMAKER: ICD-10-CM

## 2023-07-06 DIAGNOSIS — I48.92 ATRIAL FLUTTER, PAROXYSMAL (HCC): ICD-10-CM

## 2023-07-06 PROCEDURE — 4004F PT TOBACCO SCREEN RCVD TLK: CPT | Performed by: INTERNAL MEDICINE

## 2023-07-06 PROCEDURE — G8427 DOCREV CUR MEDS BY ELIG CLIN: HCPCS | Performed by: INTERNAL MEDICINE

## 2023-07-06 PROCEDURE — 1123F ACP DISCUSS/DSCN MKR DOCD: CPT | Performed by: INTERNAL MEDICINE

## 2023-07-06 PROCEDURE — G8420 CALC BMI NORM PARAMETERS: HCPCS | Performed by: INTERNAL MEDICINE

## 2023-07-06 PROCEDURE — 99213 OFFICE O/P EST LOW 20 MIN: CPT | Performed by: INTERNAL MEDICINE

## 2023-07-06 PROCEDURE — 1090F PRES/ABSN URINE INCON ASSESS: CPT | Performed by: INTERNAL MEDICINE

## 2023-07-06 NOTE — PROGRESS NOTES
In [employer] Aaron Dual Pacemaker --has Merlin at home  Patient of Nellie Number    Battery 4.8 years    Presenting rhythm AS   Underlying dependent in ventricle at 40    A Impedance 430  RV Impedance 350    P wave sensing 4.9  R wave sensing -    A Threshold 0.5 @ 0.5  RV Thresholds 0.625 @ 0.50      A Paced 13%  V Paced >99%    Programmed Mode DDDR     Hx AF- taking eliquis  Afib [place] <1%    Episodes   Mode switches- AFlutter

## 2023-07-24 ENCOUNTER — HOSPITAL ENCOUNTER (OUTPATIENT)
Dept: NON INVASIVE DIAGNOSTICS | Age: 88
End: 2023-07-24
Attending: INTERNAL MEDICINE
Payer: MEDICARE

## 2023-07-24 ENCOUNTER — HOSPITAL ENCOUNTER (OUTPATIENT)
Dept: NON INVASIVE DIAGNOSTICS | Age: 88
Discharge: HOME | End: 2023-07-24
Attending: INTERNAL MEDICINE
Payer: MEDICARE

## 2023-07-24 DIAGNOSIS — R06.09 DOE (DYSPNEA ON EXERTION): ICD-10-CM

## 2023-07-24 DIAGNOSIS — R07.9 CHEST PAIN, UNSPECIFIED TYPE: ICD-10-CM

## 2023-07-24 LAB
LV EF: 48 %
LVEF MODALITY: NORMAL

## 2023-07-24 PROCEDURE — 93306 TTE W/DOPPLER COMPLETE: CPT

## 2023-07-25 ENCOUNTER — TELEPHONE (OUTPATIENT)
Dept: CARDIOLOGY CLINIC | Age: 88
End: 2023-07-25

## 2023-07-25 DIAGNOSIS — I35.0 AORTIC VALVE STENOSIS, ETIOLOGY OF CARDIAC VALVE DISEASE UNSPECIFIED: Primary | ICD-10-CM

## 2023-07-25 NOTE — TELEPHONE ENCOUNTER
Results of echo:  : Transaortic velocity was 2.3   m/s, mean gradient 14 mmHg, max gradient 20 mmHg, CHRISTI 0.83 cm2, suggestive   of severe PLFLG aortic stenosis. Ejection fraction was estimated at 45-50%. EF in 2021: 50-55%.

## 2023-08-01 NOTE — TELEPHONE ENCOUNTER
DSE scheduled by [facility] when patient called back. Earlier appts were offered however patient did not have transportation for those times. DSE scheduled for 9/5/23 at 2:30pm with Dr. Sundar Mack. Aranza stress test was cancelled.

## 2023-09-05 ENCOUNTER — TELEPHONE (OUTPATIENT)
Dept: CARDIOLOGY CLINIC | Age: 88
End: 2023-09-05

## 2023-09-05 ENCOUNTER — HOSPITAL ENCOUNTER (OUTPATIENT)
Dept: NON INVASIVE DIAGNOSTICS | Age: 88
Discharge: HOME | End: 2023-09-05
Attending: INTERNAL MEDICINE
Payer: MEDICARE

## 2023-09-05 VITALS — HEIGHT: 62 IN | BODY MASS INDEX: 21.07 KG/M2

## 2023-09-05 DIAGNOSIS — I35.0 AORTIC VALVE STENOSIS, ETIOLOGY OF CARDIAC VALVE DISEASE UNSPECIFIED: ICD-10-CM

## 2023-09-05 DIAGNOSIS — I35.0 AORTIC VALVE STENOSIS, ETIOLOGY OF CARDIAC VALVE DISEASE UNSPECIFIED: Primary | ICD-10-CM

## 2023-09-05 PROCEDURE — 93017 CV STRESS TEST TRACING ONLY: CPT | Performed by: INTERNAL MEDICINE

## 2023-09-05 PROCEDURE — 93351 STRESS TTE COMPLETE: CPT

## 2023-09-05 PROCEDURE — 6360000002 HC RX W HCPCS

## 2023-09-05 NOTE — TELEPHONE ENCOUNTER
Orders received from Dr. Freeman Goodwin to schedule TAVR CTAs. Pt has not had labs since Jan 2022. CBC BMP orders received. Pt and kevin Egan notified. Will obtain labs and proceed with workup as appropriate.

## 2023-09-11 ENCOUNTER — HOSPITAL ENCOUNTER (OUTPATIENT)
Age: 88
Discharge: HOME | End: 2023-09-11
Payer: MEDICARE

## 2023-09-11 DIAGNOSIS — I35.0 AORTIC VALVE STENOSIS, ETIOLOGY OF CARDIAC VALVE DISEASE UNSPECIFIED: ICD-10-CM

## 2023-09-11 LAB
ANION GAP SERPL CALC-SCNC: 7 MEQ/L (ref 8–16)
BUN SERPL-MCNC: 26 MG/DL (ref 7–22)
CALCIUM SERPL-MCNC: 9.5 MG/DL (ref 8.5–10.5)
CHLORIDE SERPL-SCNC: 100 MEQ/L (ref 98–111)
CO2 SERPL-SCNC: 31 MEQ/L (ref 23–33)
CREAT SERPL-MCNC: 0.9 MG/DL (ref 0.4–1.2)
DEPRECATED RDW RBC AUTO: 49.4 FL (ref 35–45)
ERYTHROCYTE [DISTWIDTH] IN BLOOD BY AUTOMATED COUNT: 14.1 % (ref 11.5–14.5)
GFR SERPL CREATININE-BSD FRML MDRD: > 60 ML/MIN/1.73M2
GLUCOSE SERPL-MCNC: 93 MG/DL (ref 70–108)
HCT VFR BLD AUTO: 40.9 % (ref 37–47)
HGB BLD-MCNC: 12.8 GM/DL (ref 12–16)
MCH RBC QN AUTO: 29.7 PG (ref 26–33)
MCHC RBC AUTO-ENTMCNC: 31.3 GM/DL (ref 32.2–35.5)
MCV RBC AUTO: 94.9 FL (ref 81–99)
PLATELET # BLD AUTO: 223 THOU/MM3 (ref 130–400)
PMV BLD AUTO: 10 FL (ref 9.4–12.4)
POTASSIUM SERPL-SCNC: 5 MEQ/L (ref 3.5–5.2)
RBC # BLD AUTO: 4.31 MILL/MM3 (ref 4.2–5.4)
SODIUM SERPL-SCNC: 138 MEQ/L (ref 135–145)
WBC # BLD AUTO: 7 THOU/MM3 (ref 4.8–10.8)

## 2023-09-11 PROCEDURE — 85027 COMPLETE CBC AUTOMATED: CPT

## 2023-09-11 PROCEDURE — 80048 BASIC METABOLIC PNL TOTAL CA: CPT

## 2023-09-11 PROCEDURE — 36415 COLL VENOUS BLD VENIPUNCTURE: CPT

## 2023-09-11 NOTE — TELEPHONE ENCOUNTER
Labs received. Orders placed for TAVR CTAs. Scans scheduled 9/21 1300. Reviewed with pt's kevin Cristina. Instructions reviewed for NPO 4 hours prior. Verbalized understanding.

## 2023-09-21 ENCOUNTER — HOSPITAL ENCOUNTER (OUTPATIENT)
Dept: CT IMAGING | Age: 88
Discharge: HOME | End: 2023-09-21
Payer: MEDICARE

## 2023-09-21 DIAGNOSIS — I35.0 AORTIC VALVE STENOSIS, ETIOLOGY OF CARDIAC VALVE DISEASE UNSPECIFIED: ICD-10-CM

## 2023-09-21 PROCEDURE — 6360000004 HC RX CONTRAST MEDICATION: Performed by: INTERNAL MEDICINE

## 2023-09-21 PROCEDURE — 74174 CTA ABD&PLVS W/CONTRAST: CPT

## 2023-09-21 PROCEDURE — 71275 CT ANGIOGRAPHY CHEST: CPT

## 2023-09-21 RX ADMIN — IOPAMIDOL 125 ML: 755 INJECTION, SOLUTION INTRAVENOUS at 13:21

## 2023-09-23 ENCOUNTER — TELEPHONE (OUTPATIENT)
Dept: CARDIOLOGY CLINIC | Age: 88
End: 2023-09-23

## 2023-09-23 NOTE — TELEPHONE ENCOUNTER
Dr Alberta Campbell patient had TAVR CTAs performed on 9/21. Here is the calcium score:        Would you like to proceed with TAVR work up?

## 2023-09-25 NOTE — TELEPHONE ENCOUNTER
6 month follow up with Dr Danyel Gustafson on March 25th at 1215 pm.     Patient sees a pulmonologist at [facility], [place]

## 2023-10-26 ENCOUNTER — TELEPHONE (OUTPATIENT)
Dept: CARDIOLOGY CLINIC | Age: 88
End: 2023-10-26

## 2023-10-26 DIAGNOSIS — R55 SYNCOPE AND COLLAPSE: ICD-10-CM

## 2023-10-26 DIAGNOSIS — I35.0 SEVERE AORTIC STENOSIS: Primary | ICD-10-CM

## 2023-10-26 NOTE — TELEPHONE ENCOUNTER
Reviewed, symptoms are worse  We should proceed with TAVR  She already has CTA completed, would proceed with cath

## 2023-10-26 NOTE — TELEPHONE ENCOUNTER
Patient's niece called and was very concerned about her aunt after an appointment with Dr Danny Garcia. She stated that Dr Danny Garcia was concerned that she wasn't getting her valve worked on sooner. I did ask if the patient was having any symptoms, she said none more than usual but she has had some increased dizziness the last two days. I explained that I would talk to Darlene Poe and go from there. Spoke with Darlene Poe and she was upset with the appointment with Dr Danny Garcia and was concerned when he was questioning why she wasn't getting her valve worked on. I did explain that if she's not any symptoms we tend to follow up with patients in 6 months. Patient said she has had some dizzy spells the last two days, but her shortness of breath isn't any more than usual. She asked that Dr Petros Maxwell call Dr Danny Garcia to inform him on the rationale of a 6 month appointment.

## 2023-10-27 ENCOUNTER — PROCEDURE VISIT (OUTPATIENT)
Dept: CARDIOLOGY CLINIC | Age: 88
End: 2023-10-27

## 2023-10-27 DIAGNOSIS — Z95.0 PACEMAKER: Primary | ICD-10-CM

## 2023-10-27 NOTE — TELEPHONE ENCOUNTER
Spoke with patient about worsening symptoms. She would like to see Dr Davion Awan to proceed with the TAVR work up. Appointment with Franky GARAY November 14th @ 1015 am  Carotid Ultrasound:  Heart Cath:    CHF Appointment with Santino Baker NP November 21st @ 1000 am    She has had recent dental cleaning at [facility]. Will send clearance to them. Bernie Aguilar can you put carotid ultrasound sometime after the appointment with Anthony Lance. Heart cath on the 17th? Emilia can you prior auth the cath, please?

## 2023-10-27 NOTE — PROGRESS NOTES
Merlin st rolando dual pacer     . [employer] Battery longevity:  4.6 years on device   Presenting rhythm  ASVP    10/1/23 7 beatsVT   8/19/23 extended episode of VT         Atrial impedance 450  RV impedance 390    P wave sensing 4.7  R wave sensing not measured     15 % atrial paced  >99 % RV paced     Atrial threshold 0.5 V  at 0.5ms  RV threshold 0.625 V at 0.5ms  Mode switches   12/eliquis

## 2023-10-27 NOTE — TELEPHONE ENCOUNTER
Carotid scheduled for 11/14/23 at 11:00am.   Cath scheduled for 11/17/23 at 8am with Dr. Nellie Correa. Ni Matamoros in cath lab notified. On doc's schedule. Order faxed.

## 2023-11-07 ENCOUNTER — TELEPHONE (OUTPATIENT)
Dept: CARDIOLOGY CLINIC | Age: 88
End: 2023-11-07

## 2023-11-08 ENCOUNTER — OFFICE VISIT (OUTPATIENT)
Dept: CARDIOLOGY CLINIC | Age: 88
End: 2023-11-08
Payer: MEDICARE

## 2023-11-08 VITALS
BODY MASS INDEX: 22.52 KG/M2 | HEIGHT: 62 IN | HEART RATE: 72 BPM | WEIGHT: 122.4 LBS | DIASTOLIC BLOOD PRESSURE: 78 MMHG | SYSTOLIC BLOOD PRESSURE: 150 MMHG

## 2023-11-08 DIAGNOSIS — I35.0 AORTIC STENOSIS, SEVERE: Primary | ICD-10-CM

## 2023-11-08 PROCEDURE — 1090F PRES/ABSN URINE INCON ASSESS: CPT | Performed by: INTERNAL MEDICINE

## 2023-11-08 PROCEDURE — G8484 FLU IMMUNIZE NO ADMIN: HCPCS | Performed by: INTERNAL MEDICINE

## 2023-11-08 PROCEDURE — G8420 CALC BMI NORM PARAMETERS: HCPCS | Performed by: INTERNAL MEDICINE

## 2023-11-08 PROCEDURE — 1036F TOBACCO NON-USER: CPT | Performed by: INTERNAL MEDICINE

## 2023-11-08 PROCEDURE — G8427 DOCREV CUR MEDS BY ELIG CLIN: HCPCS | Performed by: INTERNAL MEDICINE

## 2023-11-08 PROCEDURE — 99215 OFFICE O/P EST HI 40 MIN: CPT | Performed by: INTERNAL MEDICINE

## 2023-11-08 PROCEDURE — 1123F ACP DISCUSS/DSCN MKR DOCD: CPT | Performed by: INTERNAL MEDICINE

## 2023-11-08 NOTE — PROGRESS NOTES
[address] [address]  Dept: [phone]  Dept Fax: [phone]  Loc: [phone]    Visit Date: 11/8/2023    Ms. Valentin Roach is a 80 y.o. female  who presented for:  Chief Complaint   Patient presents with    Follow-up     Discuss TAVR       HPI:   HPI   81 yo F hx of SSS s/p PM, Aflutter, PCI 2018 RCA and LAD, on Plavix/Eliquis who presents for follow-up. She has sob and fatigue. She has sharp chest pain on and off. EF preserved. She has cough and MAJOR. She has her own teeth. She has no cancers. Her DSE is positive for contractile reserve. She has no passed out. She has more so pain around her chest.  She does get dizzy. History: bronchiectasis, CAD, COPD, depression, GERD, hernia, Dual Chamber St Aaron pacemaker, sick sinus syndrome, Atrial Flutter, restless leg syndrome     ECHO: Obtained on 7/24/23 with the below results:       EKG: Obtained on 7/6 resulting in AV paced      Cardiac Catheterization:  to be performed on 11/17/23.       CHF appointment: 11/21 @ 1000 am with Murray Evans NP     Dental Clearance: dental clearance sent to [address] [place] [address]     CV Surgery: 11/14 @ 1015 am with Cynthia GARAY     Carotid Ultrasounds: 11/14 @ 1100 am     TAVR CTAs: 9/21/23       Labs:  9/11/23            Current Outpatient Medications:     apixaban (ELIQUIS) 5 MG TABS tablet, TAKE 1 TABLET TWICE A DAY, Disp: 180 tablet, Rfl: 3    albuterol sulfate HFA (VENTOLIN HFA) 108 (90 Base) MCG/ACT inhaler, Inhale 2 puffs into the lungs 4 times daily as needed for Wheezing, Disp: 18 g, Rfl: 0    Spacer/Aero-Holding Chambers JANET, 1 Device by Does not apply route daily as needed (spacer), Disp: 1 each, Rfl: 0    ascorbic acid (VITAMIN C) 500 MG tablet, Take 1 tablet by mouth 2 times daily for 7 days, Disp: 14 tablet, Rfl: 0    zinc sulfate (ZINCATE) 220 (50 Zn) MG capsule, Take 1 capsule by mouth daily for 7 days,

## 2023-11-08 NOTE — TELEPHONE ENCOUNTER
Patient sees Dr Richmond Hylton on 11/8/23 @ 2 pm    Pre-TAVR workup:    History: bronchiectasis, CAD, COPD, depression, GERD, hernia, Dual Chamber St Aaron pacemaker, sick sinus syndrome, Atrial Flutter, restless leg syndrome    ECHO: Obtained on 7/24/23 with the below results:      EKG: Obtained on 7/6 resulting in AV paced     Cardiac Catheterization:  to be performed on 11/17/23.      CHF appointment: 11/21 @ 1000 am with Alexandria Wheeler NP    Dental Clearance: dental clearance sent to [address] [place] [address]    CV Surgery: 11/14 @ 1015 am with Daniel GARAY    Carotid Ultrasounds: 11/14 @ 1100 am    TAVR CTAs: 9/21/23      Labs:  9/11/23            *patient needs appointments given to her at appointment*

## 2023-11-08 NOTE — PROGRESS NOTES
Here to discuss TAVR. Med list up to date and pharmacy verified. EKG in chart from 07/2023. Reports SOB and denies chest pain.

## 2023-11-14 ENCOUNTER — HOSPITAL ENCOUNTER (OUTPATIENT)
Dept: INTERVENTIONAL RADIOLOGY/VASCULAR | Age: 88
Discharge: HOME | End: 2023-11-16
Attending: INTERNAL MEDICINE
Payer: MEDICARE

## 2023-11-14 ENCOUNTER — OFFICE VISIT (OUTPATIENT)
Dept: CARDIOTHORACIC SURGERY | Age: 88
End: 2023-11-14
Payer: MEDICARE

## 2023-11-14 VITALS
SYSTOLIC BLOOD PRESSURE: 151 MMHG | HEIGHT: 62 IN | BODY MASS INDEX: 22.45 KG/M2 | DIASTOLIC BLOOD PRESSURE: 70 MMHG | OXYGEN SATURATION: 96 % | HEART RATE: 74 BPM | WEIGHT: 122 LBS

## 2023-11-14 DIAGNOSIS — R55 SYNCOPE AND COLLAPSE: ICD-10-CM

## 2023-11-14 DIAGNOSIS — I35.0 AORTIC VALVE STENOSIS, ETIOLOGY OF CARDIAC VALVE DISEASE UNSPECIFIED: Primary | ICD-10-CM

## 2023-11-14 DIAGNOSIS — I35.0 SEVERE AORTIC STENOSIS: ICD-10-CM

## 2023-11-14 PROCEDURE — 1090F PRES/ABSN URINE INCON ASSESS: CPT | Performed by: PHYSICIAN ASSISTANT

## 2023-11-14 PROCEDURE — G8427 DOCREV CUR MEDS BY ELIG CLIN: HCPCS | Performed by: PHYSICIAN ASSISTANT

## 2023-11-14 PROCEDURE — 93880 EXTRACRANIAL BILAT STUDY: CPT

## 2023-11-14 PROCEDURE — 1036F TOBACCO NON-USER: CPT | Performed by: PHYSICIAN ASSISTANT

## 2023-11-14 PROCEDURE — G8420 CALC BMI NORM PARAMETERS: HCPCS | Performed by: PHYSICIAN ASSISTANT

## 2023-11-14 PROCEDURE — G8484 FLU IMMUNIZE NO ADMIN: HCPCS | Performed by: PHYSICIAN ASSISTANT

## 2023-11-14 PROCEDURE — 1123F ACP DISCUSS/DSCN MKR DOCD: CPT | Performed by: PHYSICIAN ASSISTANT

## 2023-11-14 PROCEDURE — 99204 OFFICE O/P NEW MOD 45 MIN: CPT | Performed by: PHYSICIAN ASSISTANT

## 2023-11-14 NOTE — PATIENT INSTRUCTIONS
You may receive a survey regarding the care you received during your visit. Your input is valuable to us. We encourage you to complete and return your survey. We hope you will choose us in the future for your healthcare needs. Thank you for choosing [facility]!     Your Medical Assistant Today:  Romana Ramos     Your Provider for Today: Ridge Cordoba PA-C  Ph. [phone] opt 1

## 2023-11-14 NOTE — PROGRESS NOTES
CT surgery New Patient Office Appointment        Patient's Name/Date of Birth: Aurora Barron / 1935 (71 y.o.)    PCP: Dilshad Arredondo MD    Date: November 14, 2023     CC:     Chief Complaint   Patient presents with    New Patient     TAVR eval     HPI  We had the pleasure of seeing Aurora Barron in the office today, as you know this is a very pleasant 80y.o. year old female with a history of severe symptomatic aortic stenosis with significant MAJOR and even times of lightheadedness. She has hx of CAD with coronary stents in the past also. She already has a PPM also. Stress Echo: 9/5/23   Summary   Baseline: EF 40-45%, CHRISTI 0.74 cm2, mean gradient 15 mmHg, max gradient 26   mmHg, Vmax 2.6 m/s. At 20 mcg/kg/min: EF 55-60%, CHRISTI 0.55 cm2, mean gradient 38 mmHg, max   gradient 62 mmHg, Vmax 3.9 m/s. Conclusions: Severe LF-LG aortic stenosis with contractile reserve. PastMedical History:  Charbel Block  has a past medical history of Anxiety, Arthritis, Blood circulation, collateral, Bronchiectasis ([address]), CAD (coronary artery disease), Chronic obstructive lung disease, Depression, Epigastric discomfort, Exertional dyspnea, GERD (gastroesophageal reflux disease), Hernia, History of blood transfusion, History of pneumonia, History of positive PPD, Irregular heart beat, Joint pain, Pacemaker, Pneumonia, Restless legs syndrome, and SSS (sick sinus syndrome) ([address]). Past Surgical History:  The patient  has a past surgical history that includes transthoracic echocardiogram (8 01 2011); cardiovascular stress test (8 23 2010); Cardiac pacemaker placement (2001); Cardiac pacemaker placement (4 13 2010); Diagnostic Cardiac Cath Lab Procedure (8 02 2006); Coronary angioplasty with stent (11/2011); pacemaker placement; Appendectomy; Cholecystectomy; Hysterectomy; Colonoscopy; Endoscopy, colon, diagnostic; back surgery; and joint replacement. Allergies:   The patient is allergic to adhesive tape, eggs or

## 2023-11-17 ENCOUNTER — HOSPITAL ENCOUNTER (OUTPATIENT)
Age: 88
Setting detail: OUTPATIENT SURGERY
Discharge: HOME | End: 2023-11-17
Attending: INTERNAL MEDICINE | Admitting: INTERNAL MEDICINE
Payer: MEDICARE

## 2023-11-17 VITALS
TEMPERATURE: 98 F | OXYGEN SATURATION: 96 % | WEIGHT: 118 LBS | SYSTOLIC BLOOD PRESSURE: 104 MMHG | BODY MASS INDEX: 21.71 KG/M2 | RESPIRATION RATE: 17 BRPM | DIASTOLIC BLOOD PRESSURE: 53 MMHG | HEIGHT: 62 IN | HEART RATE: 78 BPM

## 2023-11-17 DIAGNOSIS — I35.0 AORTIC STENOSIS, SEVERE: ICD-10-CM

## 2023-11-17 LAB
ABO: NORMAL
ANION GAP SERPL CALC-SCNC: 8 MEQ/L (ref 8–16)
ANTIBODY SCREEN: NORMAL
APTT PPP: 30.2 SECONDS (ref 22–38)
BDY SITE: ABNORMAL
BDY SITE: NORMAL
BUN SERPL-MCNC: 22 MG/DL (ref 7–22)
CALCIUM SERPL-MCNC: 9.1 MG/DL (ref 8.5–10.5)
CHLORIDE SERPL-SCNC: 103 MEQ/L (ref 98–111)
CO2 SERPL-SCNC: 30 MEQ/L (ref 23–33)
COLLECTED BY:: ABNORMAL
COLLECTED BY:: NORMAL
CREAT SERPL-MCNC: 0.9 MG/DL (ref 0.4–1.2)
DEPRECATED RDW RBC AUTO: 51.4 FL (ref 35–45)
ECHO BSA: 1.53 M2
ERYTHROCYTE [DISTWIDTH] IN BLOOD BY AUTOMATED COUNT: 14.2 % (ref 11.5–14.5)
GFR SERPL CREATININE-BSD FRML MDRD: > 60 ML/MIN/1.73M2
GLUCOSE SERPL-MCNC: 98 MG/DL (ref 70–108)
HCT VFR BLD AUTO: 35.6 % (ref 37–47)
HGB BLD-MCNC: 11.1 GM/DL (ref 12–16)
INR PPP: 1.06 (ref 0.85–1.13)
MCH RBC QN AUTO: 30.4 PG (ref 26–33)
MCHC RBC AUTO-ENTMCNC: 31.2 GM/DL (ref 32.2–35.5)
MCV RBC AUTO: 97.5 FL (ref 81–99)
PLATELET # BLD AUTO: 177 THOU/MM3 (ref 130–400)
PMV BLD AUTO: 9.9 FL (ref 9.4–12.4)
POTASSIUM SERPL-SCNC: 4.5 MEQ/L (ref 3.5–5.2)
RBC # BLD AUTO: 3.65 MILL/MM3 (ref 4.2–5.4)
RH FACTOR: NORMAL
SAO2 % BLD: 72 % (ref 94–97)
SAO2 % BLD: 96 % (ref 94–97)
SODIUM SERPL-SCNC: 141 MEQ/L (ref 135–145)
WBC # BLD AUTO: 7 THOU/MM3 (ref 4.8–10.8)

## 2023-11-17 PROCEDURE — 86901 BLOOD TYPING SEROLOGIC RH(D): CPT

## 2023-11-17 PROCEDURE — 99152 MOD SED SAME PHYS/QHP 5/>YRS: CPT | Performed by: INTERNAL MEDICINE

## 2023-11-17 PROCEDURE — 86900 BLOOD TYPING SEROLOGIC ABO: CPT

## 2023-11-17 PROCEDURE — 93456 R HRT CORONARY ARTERY ANGIO: CPT | Performed by: INTERNAL MEDICINE

## 2023-11-17 PROCEDURE — 93005 ELECTROCARDIOGRAM TRACING: CPT | Performed by: INTERNAL MEDICINE

## 2023-11-17 PROCEDURE — 85027 COMPLETE CBC AUTOMATED: CPT

## 2023-11-17 PROCEDURE — 85730 THROMBOPLASTIN TIME PARTIAL: CPT

## 2023-11-17 PROCEDURE — C1894 INTRO/SHEATH, NON-LASER: HCPCS | Performed by: INTERNAL MEDICINE

## 2023-11-17 PROCEDURE — 93010 ELECTROCARDIOGRAM REPORT: CPT | Performed by: NUCLEAR MEDICINE

## 2023-11-17 PROCEDURE — 2500000003 HC RX 250 WO HCPCS: Performed by: INTERNAL MEDICINE

## 2023-11-17 PROCEDURE — 36415 COLL VENOUS BLD VENIPUNCTURE: CPT

## 2023-11-17 PROCEDURE — 2580000003 HC RX 258: Performed by: INTERNAL MEDICINE

## 2023-11-17 PROCEDURE — 85610 PROTHROMBIN TIME: CPT

## 2023-11-17 PROCEDURE — 6370000000 HC RX 637 (ALT 250 FOR IP): Performed by: INTERNAL MEDICINE

## 2023-11-17 PROCEDURE — 82810 BLOOD GASES O2 SAT ONLY: CPT

## 2023-11-17 PROCEDURE — 2709999900 HC NON-CHARGEABLE SUPPLY: Performed by: INTERNAL MEDICINE

## 2023-11-17 PROCEDURE — 86850 RBC ANTIBODY SCREEN: CPT

## 2023-11-17 PROCEDURE — 80048 BASIC METABOLIC PNL TOTAL CA: CPT

## 2023-11-17 PROCEDURE — 6360000002 HC RX W HCPCS: Performed by: INTERNAL MEDICINE

## 2023-11-17 PROCEDURE — 6360000004 HC RX CONTRAST MEDICATION: Performed by: INTERNAL MEDICINE

## 2023-11-17 PROCEDURE — C1769 GUIDE WIRE: HCPCS | Performed by: INTERNAL MEDICINE

## 2023-11-17 RX ORDER — SODIUM CHLORIDE 0.9 % (FLUSH) 0.9 %
5-40 SYRINGE (ML) INJECTION PRN
Status: DISCONTINUED | OUTPATIENT
Start: 2023-11-17 | End: 2023-11-17 | Stop reason: HOSPADM

## 2023-11-17 RX ORDER — SODIUM CHLORIDE 9 MG/ML
INJECTION, SOLUTION INTRAVENOUS PRN
Status: DISCONTINUED | OUTPATIENT
Start: 2023-11-17 | End: 2023-11-17 | Stop reason: HOSPADM

## 2023-11-17 RX ORDER — MIDAZOLAM HYDROCHLORIDE 1 MG/ML
INJECTION INTRAMUSCULAR; INTRAVENOUS PRN
Status: DISCONTINUED | OUTPATIENT
Start: 2023-11-17 | End: 2023-11-17 | Stop reason: HOSPADM

## 2023-11-17 RX ORDER — ASPIRIN 81 MG/1
324 TABLET, CHEWABLE ORAL ONCE
Status: COMPLETED | OUTPATIENT
Start: 2023-11-17 | End: 2023-11-17

## 2023-11-17 RX ORDER — NITROGLYCERIN 0.4 MG/1
0.4 TABLET SUBLINGUAL EVERY 5 MIN PRN
Status: DISCONTINUED | OUTPATIENT
Start: 2023-11-17 | End: 2023-11-17 | Stop reason: HOSPADM

## 2023-11-17 RX ORDER — SODIUM CHLORIDE 9 MG/ML
INJECTION, SOLUTION INTRAVENOUS CONTINUOUS
Status: DISCONTINUED | OUTPATIENT
Start: 2023-11-17 | End: 2023-11-17 | Stop reason: HOSPADM

## 2023-11-17 RX ORDER — SODIUM CHLORIDE 0.9 % (FLUSH) 0.9 %
5-40 SYRINGE (ML) INJECTION EVERY 12 HOURS SCHEDULED
Status: DISCONTINUED | OUTPATIENT
Start: 2023-11-17 | End: 2023-11-17 | Stop reason: HOSPADM

## 2023-11-17 RX ORDER — ATROPINE SULFATE 0.4 MG/ML
0.5 INJECTION, SOLUTION INTRAVENOUS
Status: DISCONTINUED | OUTPATIENT
Start: 2023-11-17 | End: 2023-11-17 | Stop reason: HOSPADM

## 2023-11-17 RX ORDER — DIPHENHYDRAMINE HYDROCHLORIDE 50 MG/ML
25 INJECTION INTRAMUSCULAR; INTRAVENOUS ONCE
Status: DISCONTINUED | OUTPATIENT
Start: 2023-11-17 | End: 2023-11-17 | Stop reason: HOSPADM

## 2023-11-17 RX ORDER — FENTANYL CITRATE 50 UG/ML
INJECTION, SOLUTION INTRAMUSCULAR; INTRAVENOUS PRN
Status: DISCONTINUED | OUTPATIENT
Start: 2023-11-17 | End: 2023-11-17 | Stop reason: HOSPADM

## 2023-11-17 RX ORDER — LIDOCAINE HYDROCHLORIDE 20 MG/ML
INJECTION, SOLUTION EPIDURAL; INFILTRATION; INTRACAUDAL; PERINEURAL PRN
Status: DISCONTINUED | OUTPATIENT
Start: 2023-11-17 | End: 2023-11-17 | Stop reason: HOSPADM

## 2023-11-17 RX ORDER — ACETAMINOPHEN 325 MG/1
650 TABLET ORAL EVERY 4 HOURS PRN
Status: DISCONTINUED | OUTPATIENT
Start: 2023-11-17 | End: 2023-11-17 | Stop reason: HOSPADM

## 2023-11-17 RX ADMIN — SODIUM CHLORIDE: 9 INJECTION, SOLUTION INTRAVENOUS at 06:49

## 2023-11-17 RX ADMIN — ASPIRIN 324 MG: 81 TABLET, CHEWABLE ORAL at 07:26

## 2023-11-17 NOTE — H&P
[facility]  Sedation/Analgesia History & Physical    Pt Name: Myriam Hull  Account number: [de-identified]  MRN: [id number]  YOB: 1935  Provider Performing Procedure: Emmanuel Abel MD MD  Referring Provider: Emmanuel Abel MD   Primary Care Physician: Noe Polanco MD  Date: 11/17/2023    PRE-PROCEDURE    Code Status: FULL CODE  Brief History/Pre-Procedure Diagnosis:   Severe AS    Consent: : I have discussed with the patient risks, benefits, and alternatives (and relevant risks, benefits, and side effects related to alternatives or not receiving care), and likelihood of the success. The patient and/or representative appear to understand and agree to proceed. The discussion encompasses risks, benefits, and side effects related to the alternatives and the risks related to not receiving the proposed care, treatment, and services. The indication, risks and benefits of the procedure and possible therapeutic consequences and alternatives were discussed with the patient. The patient was given the opportunity to ask questions and to have them answered to his/her satisfaction. Risks of the procedure include but are not limited to the following: Bleeding, hematoma including retroperitoneal hemmorhage, infection, pain and discomfort, injury to the aorta and other blood vessels, rhythm disturbance, low blood pressure, myocardial infarction, stroke, kidney damage/failure, myocardial perforation, allergic reactions to sedatives/contrast material, loss of pulse/vascular compromise requiring surgery, aneurysm/pseudoaneurysm formation, possible loss of a limb/hand/leg, needing blood transfusion, requiring emergent open heart surgery or emergent coronary intervention, the need for intubation/respiratory support, the requirement for defibrillation/cardioversion, and death. Alternatives to and omission of the suggested procedure were discussed.  The patient had no further questions and wished

## 2023-11-19 LAB
EKG ATRIAL RATE: 75 BPM
EKG P AXIS: 82 DEGREES
EKG P-R INTERVAL: 196 MS
EKG Q-T INTERVAL: 444 MS
EKG QRS DURATION: 154 MS
EKG QTC CALCULATION (BAZETT): 495 MS
EKG R AXIS: -40 DEGREES
EKG T AXIS: 109 DEGREES
EKG VENTRICULAR RATE: 75 BPM

## 2023-11-21 ENCOUNTER — OFFICE VISIT (OUTPATIENT)
Dept: CARDIOLOGY CLINIC | Age: 88
End: 2023-11-21
Payer: MEDICARE

## 2023-11-21 VITALS
SYSTOLIC BLOOD PRESSURE: 149 MMHG | HEIGHT: 62 IN | HEART RATE: 81 BPM | BODY MASS INDEX: 22.38 KG/M2 | WEIGHT: 121.6 LBS | OXYGEN SATURATION: 96 % | DIASTOLIC BLOOD PRESSURE: 65 MMHG

## 2023-11-21 DIAGNOSIS — R06.00 NOCTURNAL DYSPNEA: ICD-10-CM

## 2023-11-21 DIAGNOSIS — I42.8 NONISCHEMIC CARDIOMYOPATHY (HCC): ICD-10-CM

## 2023-11-21 DIAGNOSIS — I35.0 AORTIC STENOSIS, SEVERE: Primary | ICD-10-CM

## 2023-11-21 DIAGNOSIS — Z91.89 AT RISK FOR FLUID VOLUME OVERLOAD: ICD-10-CM

## 2023-11-21 DIAGNOSIS — I50.22 CHRONIC SYSTOLIC CONGESTIVE HEART FAILURE, NYHA CLASS 2 (HCC): ICD-10-CM

## 2023-11-21 PROCEDURE — 99214 OFFICE O/P EST MOD 30 MIN: CPT | Performed by: NURSE PRACTITIONER

## 2023-11-21 PROCEDURE — 1036F TOBACCO NON-USER: CPT | Performed by: NURSE PRACTITIONER

## 2023-11-21 PROCEDURE — G8420 CALC BMI NORM PARAMETERS: HCPCS | Performed by: NURSE PRACTITIONER

## 2023-11-21 PROCEDURE — G8484 FLU IMMUNIZE NO ADMIN: HCPCS | Performed by: NURSE PRACTITIONER

## 2023-11-21 PROCEDURE — G8427 DOCREV CUR MEDS BY ELIG CLIN: HCPCS | Performed by: NURSE PRACTITIONER

## 2023-11-21 PROCEDURE — 1090F PRES/ABSN URINE INCON ASSESS: CPT | Performed by: NURSE PRACTITIONER

## 2023-11-21 PROCEDURE — 1123F ACP DISCUSS/DSCN MKR DOCD: CPT | Performed by: NURSE PRACTITIONER

## 2023-11-21 RX ORDER — POTASSIUM CHLORIDE 750 MG/1
10 TABLET, EXTENDED RELEASE ORAL
Qty: 90 TABLET | Refills: 0 | Status: SHIPPED | OUTPATIENT
Start: 2023-11-22

## 2023-11-21 RX ORDER — FUROSEMIDE 40 MG/1
20 TABLET ORAL
Qty: 60 TABLET | Refills: 0 | Status: SHIPPED | OUTPATIENT
Start: 2023-11-22

## 2023-11-21 ASSESSMENT — ENCOUNTER SYMPTOMS
ABDOMINAL PAIN: 0
SHORTNESS OF BREATH: 1
COUGH: 0
ABDOMINAL DISTENTION: 0
WHEEZING: 0

## 2023-11-21 NOTE — PATIENT INSTRUCTIONS
You may receive a survey regarding the care you received during your visit. Your input is valuable to us. We encourage you to complete and return your survey. We hope you will choose us in the future for your healthcare needs. Your nurses today were [email or web address]. Office hours:   Mon-Thurs 8-4:30  Friday 8-12  Phone: [phone]    Continue:  Continue current medications  Daily weights and record  Fluid restriction of 2 Liters per day  Limit sodium in diet to around 1791-4778 mg/day  Monitor BP  Activity as tolerated     Call the [address] for any of the following symptoms:   Weight gain of 2-3 pounds in 1 day or 5 pounds in 1 week  Increased shortness of breath  Shortness of breath while laying down  Cough  Chest pain  Swelling in feet, ankles or legs  Bloating in abdomen  Fatigue        Repeat blood work in two weeks    Take lasix 20mg - half tab daily for 5 days with 10meq of potassium  Then take 20mg of lasix every Monday Wednesday and Friday with 10meq of potassium     Check blood pressure 1 hr after morning medications call me if staying elevated     Continue diet/fluid adherence  Continue daily wts.   F/U w/ Cardiology  F/U in clinic post procedure

## 2023-11-22 ENCOUNTER — TELEPHONE (OUTPATIENT)
Dept: CARDIOLOGY CLINIC | Age: 88
End: 2023-11-22

## 2023-11-22 DIAGNOSIS — I35.0 AORTIC STENOSIS, SEVERE: Primary | ICD-10-CM

## 2023-11-22 NOTE — TELEPHONE ENCOUNTER
Orders received from Dr. Bhaskar Seaman to schedule the patient for a TAVR procedure and have the patient hold the follow medications the morning of the TAVR procedure: Lasix and Eliquis. Hold Eliquis for 2 days. Take last dose 12/3/23. TAVR: 12/6/23 at 1200 with an arrival of 0900  Discharge Home with niece    Valve Rep Zeferino Paiz notified with [employer]  Pacer Rep Bc Marcus notified with Dr. Mau Nelson TAVR instructions per Dr. Bhaskar Seaman: have the patient be seen in the [facility] 7 days post TAVR, obtain a CBC, BMP and ECHO prior to the 30 day post TAVR follow up appointment. 7 day post TAVR appointment:12/12/23 at 200  CHF Clinic follow up appointment 12/28/23 at 1530  CBC/BMP/ECHO:1/5/23 at 1300  30 day post TAVR appointment:1/8/23 at 200    All instructions and follow-up appointments reviewed with patient. Verbalized understanding. All instructions and follow-ups mailed at this time. Primary Cardiologist:  Dr. Bhaskar Seaman, please agree. Joes Amaro, can you check prior auth?

## 2023-11-22 NOTE — TELEPHONE ENCOUNTER
ALEX Gannon for TAVR  DOS: [birth year]      [x] Cath    [x] Echo    [x] Us Carotid    [x] CT chest, abd, pelvis    [x] CT surgery note    [x] Cardiology note    [x] EKG

## 2023-11-27 ENCOUNTER — TELEPHONE (OUTPATIENT)
Dept: CARDIOLOGY CLINIC | Age: 88
End: 2023-11-27

## 2023-11-27 NOTE — TELEPHONE ENCOUNTER
Start taking Lasix on Friday. She is urinating well. Has not had any episodes of nocturnal dyspnea, still SOB.    /70 HR 80  Wt: 116 today (118 last Wednesday)    Please call on Thursday for reassessment

## 2023-11-30 NOTE — TELEPHONE ENCOUNTER
Weight staying at 116. She notes improvement of her SOB but still some at baseline. Good urination on Lasix. No further changes at this time.

## 2023-12-05 ENCOUNTER — PREP FOR PROCEDURE (OUTPATIENT)
Dept: CARDIOLOGY | Age: 88
End: 2023-12-05

## 2023-12-05 RX ORDER — SODIUM CHLORIDE 0.9 % (FLUSH) 0.9 %
5-40 SYRINGE (ML) INJECTION EVERY 12 HOURS SCHEDULED
Status: CANCELLED | OUTPATIENT
Start: 2023-12-05

## 2023-12-05 RX ORDER — SODIUM CHLORIDE 9 MG/ML
INJECTION, SOLUTION INTRAVENOUS CONTINUOUS
Status: CANCELLED | OUTPATIENT
Start: 2023-12-05

## 2023-12-05 RX ORDER — SODIUM CHLORIDE 0.9 % (FLUSH) 0.9 %
5-40 SYRINGE (ML) INJECTION PRN
Status: CANCELLED | OUTPATIENT
Start: 2023-12-05

## 2023-12-05 RX ORDER — SODIUM CHLORIDE 9 MG/ML
INJECTION, SOLUTION INTRAVENOUS PRN
Status: CANCELLED | OUTPATIENT
Start: 2023-12-05

## 2023-12-05 RX ORDER — CHLORHEXIDINE GLUCONATE 4 G/100ML
SOLUTION TOPICAL ONCE
Status: CANCELLED | OUTPATIENT
Start: 2023-12-05 | End: 2023-12-05

## 2023-12-06 ENCOUNTER — HOSPITAL ENCOUNTER (INPATIENT)
Age: 88
LOS: 1 days | Discharge: HOME | DRG: 266 | End: 2023-12-07
Attending: INTERNAL MEDICINE | Admitting: INTERNAL MEDICINE
Payer: MEDICARE

## 2023-12-06 DIAGNOSIS — I35.0 AORTIC STENOSIS: ICD-10-CM

## 2023-12-06 LAB
ABO: NORMAL
ACTIVATED CLOTTING TIME: 287 SECONDS (ref 1–150)
ALBUMIN SERPL BCG-MCNC: 3.8 G/DL (ref 3.5–5.1)
ALP SERPL-CCNC: 69 U/L (ref 38–126)
ALT SERPL W/O P-5'-P-CCNC: 11 U/L (ref 11–66)
ANION GAP SERPL CALC-SCNC: 12 MEQ/L (ref 8–16)
ANTIBODY SCREEN: NORMAL
APTT PPP: 31.8 SECONDS (ref 22–38)
AST SERPL-CCNC: 23 U/L (ref 5–40)
BILIRUB SERPL-MCNC: 0.2 MG/DL (ref 0.3–1.2)
BUN SERPL-MCNC: 27 MG/DL (ref 7–22)
CALCIUM SERPL-MCNC: 9.7 MG/DL (ref 8.5–10.5)
CHLORIDE SERPL-SCNC: 99 MEQ/L (ref 98–111)
CO2 SERPL-SCNC: 28 MEQ/L (ref 23–33)
CREAT SERPL-MCNC: 1 MG/DL (ref 0.4–1.2)
DEPRECATED RDW RBC AUTO: 48.7 FL (ref 35–45)
ECHO BSA: 1.56 M2
ERYTHROCYTE [DISTWIDTH] IN BLOOD BY AUTOMATED COUNT: 13.9 % (ref 11.5–14.5)
GFR SERPL CREATININE-BSD FRML MDRD: 54 ML/MIN/1.73M2
GLUCOSE SERPL-MCNC: 106 MG/DL (ref 70–108)
HCT VFR BLD AUTO: 38.4 % (ref 37–47)
HGB BLD-MCNC: 12.2 GM/DL (ref 12–16)
INR PPP: 1.08 (ref 0.85–1.13)
MCH RBC QN AUTO: 30.1 PG (ref 26–33)
MCHC RBC AUTO-ENTMCNC: 31.8 GM/DL (ref 32.2–35.5)
MCV RBC AUTO: 94.8 FL (ref 81–99)
NT-PROBNP SERPL IA-MCNC: 1070 PG/ML (ref 0–449)
PLATELET # BLD AUTO: 221 THOU/MM3 (ref 130–400)
PMV BLD AUTO: 9.6 FL (ref 9.4–12.4)
POTASSIUM SERPL-SCNC: 4.7 MEQ/L (ref 3.5–5.2)
PROT SERPL-MCNC: 7.4 G/DL (ref 6.1–8)
RBC # BLD AUTO: 4.05 MILL/MM3 (ref 4.2–5.4)
RH FACTOR: NORMAL
SODIUM SERPL-SCNC: 139 MEQ/L (ref 135–145)
WBC # BLD AUTO: 8.1 THOU/MM3 (ref 4.8–10.8)

## 2023-12-06 PROCEDURE — C1769 GUIDE WIRE: HCPCS | Performed by: INTERNAL MEDICINE

## 2023-12-06 PROCEDURE — 33361 REPLACE AORTIC VALVE PERQ: CPT | Performed by: THORACIC SURGERY (CARDIOTHORACIC VASCULAR SURGERY)

## 2023-12-06 PROCEDURE — 2720000010 HC SURG SUPPLY STERILE: Performed by: INTERNAL MEDICINE

## 2023-12-06 PROCEDURE — C1894 INTRO/SHEATH, NON-LASER: HCPCS | Performed by: INTERNAL MEDICINE

## 2023-12-06 PROCEDURE — 6370000000 HC RX 637 (ALT 250 FOR IP): Performed by: INTERNAL MEDICINE

## 2023-12-06 PROCEDURE — 6360000002 HC RX W HCPCS: Performed by: PHYSICIAN ASSISTANT

## 2023-12-06 PROCEDURE — 86923 COMPATIBILITY TEST ELECTRIC: CPT

## 2023-12-06 PROCEDURE — 80053 COMPREHEN METABOLIC PANEL: CPT

## 2023-12-06 PROCEDURE — C1760 CLOSURE DEV, VASC: HCPCS | Performed by: INTERNAL MEDICINE

## 2023-12-06 PROCEDURE — 2500000003 HC RX 250 WO HCPCS: Performed by: INTERNAL MEDICINE

## 2023-12-06 PROCEDURE — 85027 COMPLETE CBC AUTOMATED: CPT

## 2023-12-06 PROCEDURE — 83880 ASSAY OF NATRIURETIC PEPTIDE: CPT

## 2023-12-06 PROCEDURE — 6360000002 HC RX W HCPCS: Performed by: INTERNAL MEDICINE

## 2023-12-06 PROCEDURE — 86850 RBC ANTIBODY SCREEN: CPT

## 2023-12-06 PROCEDURE — 2580000003 HC RX 258: Performed by: PHYSICIAN ASSISTANT

## 2023-12-06 PROCEDURE — 2140000000 HC CCU INTERMEDIATE R&B

## 2023-12-06 PROCEDURE — 93005 ELECTROCARDIOGRAM TRACING: CPT | Performed by: PHYSICIAN ASSISTANT

## 2023-12-06 PROCEDURE — 36415 COLL VENOUS BLD VENIPUNCTURE: CPT

## 2023-12-06 PROCEDURE — 33361 REPLACE AORTIC VALVE PERQ: CPT | Performed by: INTERNAL MEDICINE

## 2023-12-06 PROCEDURE — 2580000003 HC RX 258: Performed by: INTERNAL MEDICINE

## 2023-12-06 PROCEDURE — 99152 MOD SED SAME PHYS/QHP 5/>YRS: CPT | Performed by: INTERNAL MEDICINE

## 2023-12-06 PROCEDURE — 93010 ELECTROCARDIOGRAM REPORT: CPT | Performed by: INTERNAL MEDICINE

## 2023-12-06 PROCEDURE — C1889 IMPLANT/INSERT DEVICE, NOC: HCPCS | Performed by: INTERNAL MEDICINE

## 2023-12-06 PROCEDURE — 6360000004 HC RX CONTRAST MEDICATION: Performed by: INTERNAL MEDICINE

## 2023-12-06 PROCEDURE — 85347 COAGULATION TIME ACTIVATED: CPT

## 2023-12-06 PROCEDURE — 85730 THROMBOPLASTIN TIME PARTIAL: CPT

## 2023-12-06 PROCEDURE — 94760 N-INVAS EAR/PLS OXIMETRY 1: CPT

## 2023-12-06 PROCEDURE — 94640 AIRWAY INHALATION TREATMENT: CPT

## 2023-12-06 PROCEDURE — 93005 ELECTROCARDIOGRAM TRACING: CPT | Performed by: INTERNAL MEDICINE

## 2023-12-06 PROCEDURE — 86901 BLOOD TYPING SEROLOGIC RH(D): CPT

## 2023-12-06 PROCEDURE — 99153 MOD SED SAME PHYS/QHP EA: CPT | Performed by: INTERNAL MEDICINE

## 2023-12-06 PROCEDURE — 2709999900 HC NON-CHARGEABLE SUPPLY: Performed by: INTERNAL MEDICINE

## 2023-12-06 PROCEDURE — 02RF38Z REPLACEMENT OF AORTIC VALVE WITH ZOOPLASTIC TISSUE, PERCUTANEOUS APPROACH: ICD-10-PCS | Performed by: INTERNAL MEDICINE

## 2023-12-06 PROCEDURE — 86900 BLOOD TYPING SEROLOGIC ABO: CPT

## 2023-12-06 PROCEDURE — 85610 PROTHROMBIN TIME: CPT

## 2023-12-06 RX ORDER — SODIUM CHLORIDE 9 MG/ML
INJECTION, SOLUTION INTRAVENOUS PRN
Status: DISCONTINUED | OUTPATIENT
Start: 2023-12-06 | End: 2023-12-07 | Stop reason: SDUPTHER

## 2023-12-06 RX ORDER — ATORVASTATIN CALCIUM 40 MG/1
40 TABLET, FILM COATED ORAL NIGHTLY
Status: DISCONTINUED | OUTPATIENT
Start: 2023-12-06 | End: 2023-12-07 | Stop reason: HOSPADM

## 2023-12-06 RX ORDER — FENTANYL CITRATE 50 UG/ML
50 INJECTION, SOLUTION INTRAMUSCULAR; INTRAVENOUS
Status: ACTIVE | OUTPATIENT
Start: 2023-12-06 | End: 2023-12-06

## 2023-12-06 RX ORDER — HYDRALAZINE HYDROCHLORIDE 20 MG/ML
10 INJECTION INTRAMUSCULAR; INTRAVENOUS EVERY 10 MIN PRN
Status: DISCONTINUED | OUTPATIENT
Start: 2023-12-06 | End: 2023-12-07 | Stop reason: HOSPADM

## 2023-12-06 RX ORDER — FUROSEMIDE 20 MG/1
20 TABLET ORAL
Status: DISCONTINUED | OUTPATIENT
Start: 2023-12-08 | End: 2023-12-07 | Stop reason: HOSPADM

## 2023-12-06 RX ORDER — FUROSEMIDE 10 MG/ML
20 INJECTION INTRAMUSCULAR; INTRAVENOUS ONCE
Status: COMPLETED | OUTPATIENT
Start: 2023-12-06 | End: 2023-12-06

## 2023-12-06 RX ORDER — ONDANSETRON 2 MG/ML
4 INJECTION INTRAMUSCULAR; INTRAVENOUS EVERY 6 HOURS PRN
Status: DISCONTINUED | OUTPATIENT
Start: 2023-12-06 | End: 2023-12-07 | Stop reason: HOSPADM

## 2023-12-06 RX ORDER — ASPIRIN 81 MG/1
81 TABLET ORAL DAILY
Status: DISCONTINUED | OUTPATIENT
Start: 2023-12-07 | End: 2023-12-07 | Stop reason: HOSPADM

## 2023-12-06 RX ORDER — MIDAZOLAM HYDROCHLORIDE 1 MG/ML
INJECTION INTRAMUSCULAR; INTRAVENOUS PRN
Status: DISCONTINUED | OUTPATIENT
Start: 2023-12-06 | End: 2023-12-06 | Stop reason: HOSPADM

## 2023-12-06 RX ORDER — CHLORHEXIDINE GLUCONATE 4 G/100ML
SOLUTION TOPICAL ONCE
Status: COMPLETED | OUTPATIENT
Start: 2023-12-06 | End: 2023-12-06

## 2023-12-06 RX ORDER — CLOPIDOGREL BISULFATE 75 MG/1
75 TABLET ORAL DAILY
Status: DISCONTINUED | OUTPATIENT
Start: 2023-12-07 | End: 2023-12-07 | Stop reason: HOSPADM

## 2023-12-06 RX ORDER — HEPARIN SODIUM 10000 [USP'U]/ML
INJECTION, SOLUTION INTRAVENOUS; SUBCUTANEOUS PRN
Status: DISCONTINUED | OUTPATIENT
Start: 2023-12-06 | End: 2023-12-06 | Stop reason: HOSPADM

## 2023-12-06 RX ORDER — POLYETHYLENE GLYCOL 3350 17 G/17G
17 POWDER, FOR SOLUTION ORAL DAILY PRN
Status: DISCONTINUED | OUTPATIENT
Start: 2023-12-06 | End: 2023-12-07 | Stop reason: HOSPADM

## 2023-12-06 RX ORDER — NITROGLYCERIN 0.4 MG/1
0.4 TABLET SUBLINGUAL EVERY 5 MIN PRN
Status: DISCONTINUED | OUTPATIENT
Start: 2023-12-06 | End: 2023-12-07 | Stop reason: HOSPADM

## 2023-12-06 RX ORDER — ASPIRIN 325 MG
TABLET ORAL PRN
Status: DISCONTINUED | OUTPATIENT
Start: 2023-12-06 | End: 2023-12-06 | Stop reason: HOSPADM

## 2023-12-06 RX ORDER — ACETAMINOPHEN 325 MG/1
650 TABLET ORAL EVERY 4 HOURS PRN
Status: DISCONTINUED | OUTPATIENT
Start: 2023-12-06 | End: 2023-12-07 | Stop reason: HOSPADM

## 2023-12-06 RX ORDER — AMLODIPINE BESYLATE 5 MG/1
5 TABLET ORAL DAILY
Status: DISCONTINUED | OUTPATIENT
Start: 2023-12-07 | End: 2023-12-07 | Stop reason: HOSPADM

## 2023-12-06 RX ORDER — ROPINIROLE 1 MG/1
1 TABLET, FILM COATED ORAL NIGHTLY
Status: DISCONTINUED | OUTPATIENT
Start: 2023-12-06 | End: 2023-12-07 | Stop reason: HOSPADM

## 2023-12-06 RX ORDER — ALBUTEROL SULFATE 90 UG/1
2 AEROSOL, METERED RESPIRATORY (INHALATION) EVERY 6 HOURS PRN
Status: DISCONTINUED | OUTPATIENT
Start: 2023-12-06 | End: 2023-12-07 | Stop reason: HOSPADM

## 2023-12-06 RX ORDER — SODIUM CHLORIDE 9 MG/ML
INJECTION, SOLUTION INTRAVENOUS CONTINUOUS
Status: DISCONTINUED | OUTPATIENT
Start: 2023-12-06 | End: 2023-12-06

## 2023-12-06 RX ORDER — SODIUM CHLORIDE 0.9 % (FLUSH) 0.9 %
5-40 SYRINGE (ML) INJECTION PRN
Status: DISCONTINUED | OUTPATIENT
Start: 2023-12-06 | End: 2023-12-07 | Stop reason: HOSPADM

## 2023-12-06 RX ORDER — OXYBUTYNIN CHLORIDE 5 MG/1
5 TABLET ORAL 3 TIMES DAILY
Status: DISCONTINUED | OUTPATIENT
Start: 2023-12-06 | End: 2023-12-07 | Stop reason: HOSPADM

## 2023-12-06 RX ORDER — SODIUM CHLORIDE 0.9 % (FLUSH) 0.9 %
5-40 SYRINGE (ML) INJECTION PRN
Status: DISCONTINUED | OUTPATIENT
Start: 2023-12-06 | End: 2023-12-07 | Stop reason: SDUPTHER

## 2023-12-06 RX ORDER — MIDAZOLAM HYDROCHLORIDE 1 MG/ML
1 INJECTION INTRAMUSCULAR; INTRAVENOUS EVERY 6 HOURS PRN
Status: ACTIVE | OUTPATIENT
Start: 2023-12-06 | End: 2023-12-06

## 2023-12-06 RX ORDER — ATROPINE SULFATE 0.4 MG/ML
0.5 INJECTION, SOLUTION INTRAVENOUS
Status: DISCONTINUED | OUTPATIENT
Start: 2023-12-06 | End: 2023-12-07 | Stop reason: HOSPADM

## 2023-12-06 RX ORDER — SODIUM CHLORIDE 0.9 % (FLUSH) 0.9 %
5-40 SYRINGE (ML) INJECTION EVERY 12 HOURS SCHEDULED
Status: DISCONTINUED | OUTPATIENT
Start: 2023-12-06 | End: 2023-12-07 | Stop reason: HOSPADM

## 2023-12-06 RX ORDER — BISACODYL 5 MG/1
5 TABLET, DELAYED RELEASE ORAL DAILY PRN
Status: DISCONTINUED | OUTPATIENT
Start: 2023-12-06 | End: 2023-12-07 | Stop reason: HOSPADM

## 2023-12-06 RX ORDER — PANTOPRAZOLE SODIUM 40 MG/1
40 TABLET, DELAYED RELEASE ORAL 2 TIMES DAILY
Status: DISCONTINUED | OUTPATIENT
Start: 2023-12-06 | End: 2023-12-07 | Stop reason: HOSPADM

## 2023-12-06 RX ORDER — CLOPIDOGREL 300 MG/1
TABLET, FILM COATED ORAL PRN
Status: DISCONTINUED | OUTPATIENT
Start: 2023-12-06 | End: 2023-12-06 | Stop reason: HOSPADM

## 2023-12-06 RX ORDER — FENTANYL CITRATE 50 UG/ML
INJECTION, SOLUTION INTRAMUSCULAR; INTRAVENOUS PRN
Status: DISCONTINUED | OUTPATIENT
Start: 2023-12-06 | End: 2023-12-06 | Stop reason: HOSPADM

## 2023-12-06 RX ORDER — POTASSIUM CHLORIDE 750 MG/1
10 TABLET, FILM COATED, EXTENDED RELEASE ORAL
Status: DISCONTINUED | OUTPATIENT
Start: 2023-12-08 | End: 2023-12-07 | Stop reason: HOSPADM

## 2023-12-06 RX ORDER — PRAMIPEXOLE DIHYDROCHLORIDE 0.12 MG/1
0.12 TABLET ORAL NIGHTLY
COMMUNITY

## 2023-12-06 RX ORDER — PRAMIPEXOLE DIHYDROCHLORIDE 0.25 MG/1
0.12 TABLET ORAL NIGHTLY
Status: DISCONTINUED | OUTPATIENT
Start: 2023-12-06 | End: 2023-12-07 | Stop reason: HOSPADM

## 2023-12-06 RX ORDER — OXYBUTYNIN CHLORIDE 5 MG/1
5 TABLET ORAL 3 TIMES DAILY
COMMUNITY

## 2023-12-06 RX ORDER — SODIUM CHLORIDE 9 MG/ML
INJECTION, SOLUTION INTRAVENOUS PRN
Status: DISCONTINUED | OUTPATIENT
Start: 2023-12-06 | End: 2023-12-07 | Stop reason: HOSPADM

## 2023-12-06 RX ORDER — SODIUM CHLORIDE 0.9 % (FLUSH) 0.9 %
5-40 SYRINGE (ML) INJECTION EVERY 12 HOURS SCHEDULED
Status: DISCONTINUED | OUTPATIENT
Start: 2023-12-06 | End: 2023-12-07 | Stop reason: SDUPTHER

## 2023-12-06 RX ADMIN — ALBUTEROL SULFATE 2 PUFF: 90 AEROSOL, METERED RESPIRATORY (INHALATION) at 21:41

## 2023-12-06 RX ADMIN — CHLORHEXIDINE GLUCONATE: 4 SOLUTION TOPICAL at 11:00

## 2023-12-06 RX ADMIN — PANTOPRAZOLE SODIUM 40 MG: 40 TABLET, DELAYED RELEASE ORAL at 20:27

## 2023-12-06 RX ADMIN — SODIUM CHLORIDE: 9 INJECTION, SOLUTION INTRAVENOUS at 09:43

## 2023-12-06 RX ADMIN — Medication 50 MG: at 11:59

## 2023-12-06 RX ADMIN — ATORVASTATIN CALCIUM 40 MG: 40 TABLET, FILM COATED ORAL at 20:27

## 2023-12-06 RX ADMIN — FUROSEMIDE 20 MG: 10 INJECTION, SOLUTION INTRAMUSCULAR; INTRAVENOUS at 21:35

## 2023-12-06 RX ADMIN — OXYBUTYNIN CHLORIDE 5 MG: 5 TABLET ORAL at 20:27

## 2023-12-06 RX ADMIN — Medication 2000 MG: at 09:49

## 2023-12-06 RX ADMIN — SODIUM CHLORIDE: 9 INJECTION, SOLUTION INTRAVENOUS at 16:49

## 2023-12-06 RX ADMIN — ROPINIROLE HYDROCHLORIDE 1 MG: 1 TABLET, FILM COATED ORAL at 20:27

## 2023-12-06 RX ADMIN — PRAMIPEXOLE DIHYDROCHLORIDE 0.12 MG: 0.25 TABLET ORAL at 20:27

## 2023-12-06 RX ADMIN — SODIUM CHLORIDE, PRESERVATIVE FREE 10 ML: 5 INJECTION INTRAVENOUS at 16:50

## 2023-12-06 NOTE — H&P
[address]  Sedation/Analgesia History & Physical    Pt Name: Carlos Ibarra  Account number: [de-identified]  MRN: [id number]  YOB: 1935  Provider Performing Procedure: Ailyn De Luna MD MD  Referring Provider: Ailyn De Luna MD   Primary Care Physician: Marti Beach MD  Date: 12/6/2023    PRE-PROCEDURE    Code Status: FULL CODE  Brief History/Pre-Procedure Diagnosis:   Severe AS  NYHA III CHF    Consent: : I have discussed with the patient risks, benefits, and alternatives (and relevant risks, benefits, and side effects related to alternatives or not receiving care), and likelihood of the success. The patient and/or representative appear to understand and agree to proceed. The discussion encompasses risks, benefits, and side effects related to the alternatives and the risks related to not receiving the proposed care, treatment, and services. The indication, risks and benefits of the procedure and possible therapeutic consequences and alternatives were discussed with the patient. The patient was given the opportunity to ask questions and to have them answered to his/her satisfaction. Risks of the procedure include but are not limited to the following: Bleeding, hematoma including retroperitoneal hemmorhage, infection, pain and discomfort, injury to the aorta and other blood vessels, rhythm disturbance, low blood pressure, myocardial infarction, stroke, kidney damage/failure, myocardial perforation, allergic reactions to sedatives/contrast material, loss of pulse/vascular compromise requiring surgery, aneurysm/pseudoaneurysm formation, possible loss of a limb/hand/leg, needing blood transfusion, requiring emergent open heart surgery or emergent coronary intervention, the need for intubation/respiratory support, the requirement for defibrillation/cardioversion, and death. Alternatives to and omission of the suggested procedure were discussed.  The patient had no further

## 2023-12-06 NOTE — CARE COORDINATION
Case Management Assessment  Initial Evaluation    Date/Time of Evaluation: 12/6/2023 2:56 PM  Assessment Completed by: Karthik Espinoza RN    If patient is discharged prior to next notation, then this note serves as note for discharge by case management. Patient Name: Alayna Rouse                   YOB: 1935  Diagnosis: Aortic stenosis [I35.0]  Severe aortic stenosis [I35.0]                   Date / Time: 12/6/2023  8:54 AM  Location: [address] [place] [address]     Patient Admission Status: Inpatient   Readmission Risk Low 0-14, Mod 15-19), High > 20: No data recorded  Current PCP: Rani Wray MD  PCP verified by CM? Yes    Chart Reviewed: Yes      History Provided by: Patient  Patient Orientation: Alert and Oriented    Patient Cognition: Alert    Hospitalization in the last 30 days (Readmission):  No    If yes, Readmission Assessment in CM Navigator will be completed. Advance Directives:      Code Status: Full Code   Patient's Primary Decision Maker is: Named in [facility] Maddie Ernandez 1st, Galindo Gonzalez 2nd)    Primary Decision Maker: Maddie Ernandez - Brother/Sister - [phone]    Secondary Decision Maker: Simeon SequeiraPearl - Niece/Nephew - [phone]    Discharge Planning:    Patient lives with: Alone Type of Home: House  Primary Care Giver: Self  Patient Support Systems include: Family Members   Current Financial resources: Medicare, Other (Comment) (supplemental BCBS)  Current community resources: None  Current services prior to admission: Durable Medical Equipment            Current DME: Cpap, Walker, Cane (CPAP through [facility]. Does not use walker or cane but has available.)            Type of Home Care services:  None    ADLS  Prior functional level: Independent in ADLs/IADLs  Current functional level:  Independent in ADLs/IADLs    Family can provide assistance at DC: Yes (Kathryn Quintanilla will stay w pt for TAVR recovery)  Would you like Case Management to discuss the discharge plan

## 2023-12-06 NOTE — PROGRESS NOTES
Patient admitted to 2E17  Ambulatory for TAVR. Patient NPO. Patient accompanied by spouse. Vital signs obtained. Assessment and data collection intiated. Oriented to room. Policies and procedures for 2E explained. All questions answered with no further questions at this time. Fall prevention and safety precautions discussed with patient.

## 2023-12-07 ENCOUNTER — APPOINTMENT (OUTPATIENT)
Age: 88
DRG: 266 | End: 2023-12-07
Attending: INTERNAL MEDICINE
Payer: MEDICARE

## 2023-12-07 VITALS
BODY MASS INDEX: 21.46 KG/M2 | HEART RATE: 86 BPM | OXYGEN SATURATION: 96 % | HEIGHT: 62 IN | DIASTOLIC BLOOD PRESSURE: 47 MMHG | RESPIRATION RATE: 18 BRPM | TEMPERATURE: 97.7 F | WEIGHT: 116.6 LBS | SYSTOLIC BLOOD PRESSURE: 132 MMHG

## 2023-12-07 LAB
ANION GAP SERPL CALC-SCNC: 8 MEQ/L (ref 8–16)
BUN SERPL-MCNC: 20 MG/DL (ref 7–22)
CALCIUM SERPL-MCNC: 9.3 MG/DL (ref 8.5–10.5)
CHLORIDE SERPL-SCNC: 99 MEQ/L (ref 98–111)
CO2 SERPL-SCNC: 29 MEQ/L (ref 23–33)
CREAT SERPL-MCNC: 1 MG/DL (ref 0.4–1.2)
DEPRECATED RDW RBC AUTO: 48.8 FL (ref 35–45)
ECHO AV AREA PEAK VELOCITY: 2.4 CM2
ECHO AV AREA VTI: 2.2 CM2
ECHO AV AREA/BSA PEAK VELOCITY: 1.6 CM2/M2
ECHO AV AREA/BSA VTI: 1.4 CM2/M2
ECHO AV MEAN GRADIENT: 3 MMHG
ECHO AV MEAN VELOCITY: 0.8 M/S
ECHO AV PEAK GRADIENT: 6 MMHG
ECHO AV PEAK VELOCITY: 1.2 M/S
ECHO AV VELOCITY RATIO: 0.83
ECHO AV VTI: 21.8 CM
ECHO BSA: 1.56 M2
ECHO EST RA PRESSURE: 5 MMHG
ECHO LA AREA 2C: 14.6 CM2
ECHO LA AREA 4C: 13.1 CM2
ECHO LA DIAMETER INDEX: 2.43 CM/M2
ECHO LA DIAMETER: 3.7 CM
ECHO LA MAJOR AXIS: 4.5 CM
ECHO LA MINOR AXIS: 4.6 CM
ECHO LA VOL BP: 34 ML (ref 22–52)
ECHO LA VOL MOD A2C: 38 ML (ref 22–52)
ECHO LA VOL MOD A4C: 29 ML (ref 22–52)
ECHO LA VOL/BSA BIPLANE: 22 ML/M2 (ref 16–34)
ECHO LA VOLUME INDEX MOD A2C: 25 ML/M2 (ref 16–34)
ECHO LA VOLUME INDEX MOD A4C: 19 ML/M2 (ref 16–34)
ECHO LV EDV A2C: 61 ML
ECHO LV EDV A4C: 76 ML
ECHO LV EDV INDEX A4C: 50 ML/M2
ECHO LV EDV NDEX A2C: 40 ML/M2
ECHO LV EJECTION FRACTION A2C: 47 %
ECHO LV EJECTION FRACTION A4C: 46 %
ECHO LV EJECTION FRACTION BIPLANE: 45 % (ref 55–100)
ECHO LV ESV A2C: 33 ML
ECHO LV ESV A4C: 41 ML
ECHO LV ESV INDEX A2C: 22 ML/M2
ECHO LV ESV INDEX A4C: 27 ML/M2
ECHO LV FRACTIONAL SHORTENING: 23 % (ref 28–44)
ECHO LV INTERNAL DIMENSION DIASTOLE INDEX: 2.83 CM/M2
ECHO LV INTERNAL DIMENSION DIASTOLIC: 4.3 CM (ref 3.9–5.3)
ECHO LV INTERNAL DIMENSION SYSTOLIC INDEX: 2.17 CM/M2
ECHO LV INTERNAL DIMENSION SYSTOLIC: 3.3 CM
ECHO LV ISOVOLUMETRIC RELAXATION TIME (IVRT): 82 MS
ECHO LV IVSD: 1.1 CM (ref 0.6–0.9)
ECHO LV MASS 2D: 152.6 G (ref 67–162)
ECHO LV MASS INDEX 2D: 100.4 G/M2 (ref 43–95)
ECHO LV POSTERIOR WALL DIASTOLIC: 1 CM (ref 0.6–0.9)
ECHO LV RELATIVE WALL THICKNESS RATIO: 0.47
ECHO LVOT AREA: 2.8 CM2
ECHO LVOT AV VTI INDEX: 0.77
ECHO LVOT DIAM: 1.9 CM
ECHO LVOT MEAN GRADIENT: 2 MMHG
ECHO LVOT PEAK GRADIENT: 4 MMHG
ECHO LVOT PEAK VELOCITY: 1 M/S
ECHO LVOT STROKE VOLUME INDEX: 31.3 ML/M2
ECHO LVOT SV: 47.6 ML
ECHO LVOT VTI: 16.8 CM
ECHO MV A VELOCITY: 1.48 M/S
ECHO MV E DECELERATION TIME (DT): 119 MS
ECHO MV E VELOCITY: 0.86 M/S
ECHO MV E/A RATIO: 0.58
ECHO PV MAX VELOCITY: 0.7 M/S
ECHO PV PEAK GRADIENT: 2 MMHG
ECHO RIGHT VENTRICULAR SYSTOLIC PRESSURE (RVSP): 46 MMHG
ECHO RV INTERNAL DIMENSION: 2.6 CM
ECHO RV TAPSE: 2.3 CM (ref 1.7–?)
ECHO TV E WAVE: 0.7 M/S
ECHO TV REGURGITANT MAX VELOCITY: 3.21 M/S
ECHO TV REGURGITANT PEAK GRADIENT: 41 MMHG
EKG ATRIAL RATE: 83 BPM
EKG ATRIAL RATE: 88 BPM
EKG ATRIAL RATE: 94 BPM
EKG ATRIAL RATE: 94 BPM
EKG P AXIS: 65 DEGREES
EKG P AXIS: 65 DEGREES
EKG P AXIS: 76 DEGREES
EKG P AXIS: 82 DEGREES
EKG P-R INTERVAL: 202 MS
EKG P-R INTERVAL: 202 MS
EKG P-R INTERVAL: 206 MS
EKG P-R INTERVAL: 214 MS
EKG Q-T INTERVAL: 404 MS
EKG Q-T INTERVAL: 404 MS
EKG Q-T INTERVAL: 420 MS
EKG Q-T INTERVAL: 438 MS
EKG QRS DURATION: 150 MS
EKG QRS DURATION: 154 MS
EKG QTC CALCULATION (BAZETT): 505 MS
EKG QTC CALCULATION (BAZETT): 505 MS
EKG QTC CALCULATION (BAZETT): 508 MS
EKG QTC CALCULATION (BAZETT): 514 MS
EKG R AXIS: -39 DEGREES
EKG R AXIS: -43 DEGREES
EKG R AXIS: -46 DEGREES
EKG R AXIS: -46 DEGREES
EKG T AXIS: 110 DEGREES
EKG T AXIS: 110 DEGREES
EKG T AXIS: 120 DEGREES
EKG T AXIS: 120 DEGREES
EKG VENTRICULAR RATE: 83 BPM
EKG VENTRICULAR RATE: 88 BPM
EKG VENTRICULAR RATE: 94 BPM
EKG VENTRICULAR RATE: 94 BPM
ERYTHROCYTE [DISTWIDTH] IN BLOOD BY AUTOMATED COUNT: 13.7 % (ref 11.5–14.5)
GFR SERPL CREATININE-BSD FRML MDRD: 54 ML/MIN/1.73M2
GLUCOSE SERPL-MCNC: 132 MG/DL (ref 70–108)
HCT VFR BLD AUTO: 37.5 % (ref 37–47)
HGB BLD-MCNC: 11.9 GM/DL (ref 12–16)
MCH RBC QN AUTO: 30.2 PG (ref 26–33)
MCHC RBC AUTO-ENTMCNC: 31.7 GM/DL (ref 32.2–35.5)
MCV RBC AUTO: 95.2 FL (ref 81–99)
PLATELET # BLD AUTO: 179 THOU/MM3 (ref 130–400)
PMV BLD AUTO: 9.6 FL (ref 9.4–12.4)
POTASSIUM SERPL-SCNC: 4.5 MEQ/L (ref 3.5–5.2)
RBC # BLD AUTO: 3.94 MILL/MM3 (ref 4.2–5.4)
SODIUM SERPL-SCNC: 136 MEQ/L (ref 135–145)
WBC # BLD AUTO: 12.8 THOU/MM3 (ref 4.8–10.8)

## 2023-12-07 PROCEDURE — 6360000002 HC RX W HCPCS: Performed by: REGISTERED NURSE

## 2023-12-07 PROCEDURE — 93005 ELECTROCARDIOGRAM TRACING: CPT | Performed by: INTERNAL MEDICINE

## 2023-12-07 PROCEDURE — 2580000003 HC RX 258: Performed by: INTERNAL MEDICINE

## 2023-12-07 PROCEDURE — 85027 COMPLETE CBC AUTOMATED: CPT

## 2023-12-07 PROCEDURE — 6370000000 HC RX 637 (ALT 250 FOR IP): Performed by: INTERNAL MEDICINE

## 2023-12-07 PROCEDURE — 80048 BASIC METABOLIC PNL TOTAL CA: CPT

## 2023-12-07 PROCEDURE — 93306 TTE W/DOPPLER COMPLETE: CPT

## 2023-12-07 PROCEDURE — 36415 COLL VENOUS BLD VENIPUNCTURE: CPT

## 2023-12-07 PROCEDURE — 93010 ELECTROCARDIOGRAM REPORT: CPT | Performed by: INTERNAL MEDICINE

## 2023-12-07 RX ORDER — FUROSEMIDE 10 MG/ML
20 INJECTION INTRAMUSCULAR; INTRAVENOUS ONCE
Status: COMPLETED | OUTPATIENT
Start: 2023-12-07 | End: 2023-12-07

## 2023-12-07 RX ADMIN — SODIUM CHLORIDE, PRESERVATIVE FREE 10 ML: 5 INJECTION INTRAVENOUS at 08:00

## 2023-12-07 RX ADMIN — PANTOPRAZOLE SODIUM 40 MG: 40 TABLET, DELAYED RELEASE ORAL at 08:00

## 2023-12-07 RX ADMIN — FUROSEMIDE 20 MG: 10 INJECTION, SOLUTION INTRAMUSCULAR; INTRAVENOUS at 11:42

## 2023-12-07 RX ADMIN — CLOPIDOGREL BISULFATE 75 MG: 75 TABLET ORAL at 08:00

## 2023-12-07 RX ADMIN — AMLODIPINE BESYLATE 5 MG: 5 TABLET ORAL at 08:00

## 2023-12-07 RX ADMIN — ASPIRIN 81 MG: 81 TABLET, COATED ORAL at 08:00

## 2023-12-07 RX ADMIN — OXYBUTYNIN CHLORIDE 5 MG: 5 TABLET ORAL at 08:00

## 2023-12-07 NOTE — PROGRESS NOTES
Per Dr. Jayda Hamilton, he wanted a post 24 hour TAVR echo placed. Tech called nurse at 7:08 am to put echo order in.

## 2023-12-07 NOTE — DISCHARGE INSTRUCTIONS
Follow up     Do not stop your plavix or Eliquis without discussing with Dr. Harper Lopes Transcatheter Aortic Valve Replacement (TAVR) Discharge Instructions     Your follow-up appointment and echocardiogram will be scheduled by Dr. Nikolay Allen office and their office staff and will call you with the date and time. We are here for you! If you have any questions please call: Karen Dupont BSN, RN and Carrie Huffman BSN, RN  [phone]      Do you have the help you need at home? Someone must be with you for the first 5-7 days or until you are seen in the office post TAVR. ACTIVITY:  Weigh yourself every day in the morning after using the bathroom. NO DRIVING UNTIL YOU RETURN TO THE DOCTOR'S OFFICE. You may ride in a car. Do not lift more than five to ten pounds for the first week you are home.  (A gallon of milk is 7 lbs)  Get up and get dressed every day. Do not stay in bed. Set a daily routine. This is an important step in re-gaining your strength. You may walk up and down a few stairs. Walk as much as you can. This will help facilitate the healing process. Plan rest periods during the day. Avoid work that increases muscle tension.        (straining with bowel movements, moving furniture)    WOUND CARE:  Remove Band-Aids after 24 hours of placement. May shower once Band-Aids are removed. No bathtubs, pools, or hot tubs for the first week after your procedure   Cleanse wounds with Hibiclens soap and water. Pat incision dry. Keep wounds open to air after Band-Aids are removed and keep dry. A small amount of bloody or clear drainage is normal. Call if there is any extensive bruising, oozing, swelling or hardness of the site. Watch for signs of infections: redness, incision hot to the touch, fever greater than 101 degrees, swelling at the groin or incision site, or discolored drainage from your incision.   When coughing, sneezing, straining for a bowel movement,

## 2023-12-07 NOTE — PROGRESS NOTES
Inpatient Cardiac Rehabilitation Consult    Received consult for Phase II Cardiac Rehabilitation. Patient needs cardiac rehab due to TAVR on 12/6/23. Importance of Cardiac Rehab discussed with patient. Reviewed cardiac rehab class times. Patient questions answered. We will contact patient at home to schedule evaluation appointment. Patient will speak with family about attending rehab in [facility] or [facility]. Cardiac Rehab brochure given.

## 2023-12-07 NOTE — DISCHARGE SUMMARY
paced rhythm, normal S1 and S2, no murmurs, rubs, clicks, or gallops  Pulmonary/Chest: clear to auscultation bilaterally- no wheezes, rales or rhonchi, normal air movement, no respiratory distress  Neurological: alert, oriented, normal speech, no focal findings or movement disorder noted. Pulses: Bilateral radial, femoral, DP, and PT pulses noted  Lower Extremity: No edema noted. Groin incisions healing appropriately-No signs of infection or hematoma. Discharge Medications:         Medication List        CONTINUE taking these medications      albuterol sulfate  (90 Base) MCG/ACT inhaler  Commonly known as: Ventolin HFA  Inhale 2 puffs into the lungs 4 times daily as needed for Wheezing     albuterol-ipratropium  MCG/ACT Aers inhaler  Commonly known as: Combivent Respimat  Inhale 1 puff into the lungs every 6 hours as needed     apixaban 5 MG Tabs tablet  Commonly known as: Eliquis  TAKE 1 TABLET TWICE A DAY     ascorbic acid 500 MG tablet  Commonly known as: VITAMIN C  Take 1 tablet by mouth 2 times daily for 7 days     atorvastatin 40 MG tablet  Commonly known as: LIPITOR  Take 1 tablet by mouth nightly     ciprofloxacin 750 MG tablet  Commonly known as: CIPRO     clopidogrel 75 MG tablet  Commonly known as: PLAVIX  Take 1 tablet by mouth daily     diclofenac sodium 1 % Gel  Commonly known as: VOLTAREN  Apply 4 g topically 4 times daily as needed for Pain     furosemide 40 MG tablet  Commonly known as: LASIX  Take 0.5 tablets by mouth three times a week     nitroGLYCERIN 0.4 MG SL tablet  Commonly known as: Nitrostat  Place 1 tablet under the tongue every 5 minutes as needed for Chest pain up to max of 3 total doses. If no relief after 1 dose, call 911.      oxybutynin 5 MG tablet  Commonly known as: DITROPAN     pantoprazole 40 MG tablet  Commonly known as: PROTONIX  Take 1 tablet by mouth 2 times daily     potassium chloride 10 MEQ extended release tablet  Commonly known as: KLOR-CON M  Take 1

## 2023-12-07 NOTE — OP NOTE
DATE: 12/07/23    PATIENT: Lazaro Sanchez    MRN: [id number]     Acct: [de-identified]    PREOP DIAGNOSIS:   Severe aortic stenosis    Postop diagnosis:  Severe aortic stenosis    Procedure:  Transcatheter aortic valve replacement with a [employer] 26 mm Evolut FX prosthesis    OPERATORS:  Kris Hayden MD; Erica Godfrey MD    ESTIMATED BLOOD LOSS: 50 ml    A pre-procedure discussion was conducted with the patient to confirm/exclude candidacy for open surgical salvage in case of procedure failure. After informed consent was obtained from the patient, the patient was brought to the hybrid operating room and prepped in sterile fashion. Infiltration of the bilateral inguinal regions was accomplished with 2% lidocaine. For purposes of rapid ventricular pacing during TAVR deployment, the patient's pre-existing permanent pacemaker was controlled with instantaneous programming. Using the micropuncture and modified Seldinger technique under fluoroscopic guidance, a 5 Fr sheath was inserted into the left common femoral artery. Similarly, a 4 Fr sheath, followed after contrast verification by a 5 Fr sheath was inserted into the right common femoral artery. This was replaced with a 6 Fr J4 catheter over a guidewire, and a Supra Core wire was used to traverse the aortic arch. We then performed standard preclose technique by deploying a Perclose in orthogonal fashion and leaving it incomplete. A 14 Fr sheath was inserted over the wire under guidance. The patient was heparinized to an ACT above 250 seconds. A straight pigtail was placed via the left femoral artery and aortography was performed in a coplanar view to ensure alignment. The aortic valve was crossed using a straight wire through an AL1 catheter. The guidewire was changed to a J-tipped wire, on which was inserted an angled pigtail. Direct hemodynamic measurements were obtained. The valve was then checked for appropriate loading on fluoroscopy.   A Safari wire

## 2023-12-07 NOTE — PROGRESS NOTES
IV removed, telemetry removed and left in room for cleaning, discharge instructions reviewed with patient, patient with all possessions including home medications, patient taken by wheelchair to discharge lobby for transport home by daughter.

## 2023-12-07 NOTE — PLAN OF CARE
Problem: ABCDS Injury Assessment  Goal: Absence of physical injury  Outcome: Progressing  Flowsheets (Taken 12/7/2023 0303)  Absence of Physical Injury: Implement safety measures based on patient assessment     Problem: Discharge Planning  Goal: Discharge to home or other facility with appropriate resources  Outcome: Progressing  Flowsheets (Taken 12/7/2023 0303)  Discharge to home or other facility with appropriate resources: Identify barriers to discharge with patient and caregiver     Problem: Safety - Adult  Goal: Free from fall injury  Outcome: Progressing  Flowsheets (Taken 12/7/2023 0303)  Free From Fall Injury: Instruct family/caregiver on patient safety   Care plan reviewed with patient. Patient verbalizes understanding of the care plan and contributed to goal setting. Male

## 2023-12-07 NOTE — PROGRESS NOTES
Physician Progress Note      PATIENT:               Goyo Graham  CSN #:                  [id number]  :                       1935  ADMIT DATE:       2023 8:54 AM  DISCH DATE:  RESPONDING  PROVIDER #:        Velia Zaragoza MD          QUERY TEXT:    Pt admitted with severe aortic stenosis  and has \"NYHA III CHF\"  documented in   H&P . If possible, please make selection below ,document in progress notes   and discharge summary further specificity regarding the type and acuity of   CHF:    The medical record reflects the following:  Risk Factors: Severe aortic stenosis,  \"NYHA III CHF\" per H&P  Clinical Indicators: \"NYHA III CHF\" per H&P , Pro bnp on admission 1070.0,   ECHO 23 - Summary  Normal left ventricular size and moderately reduced systolic function. There was moderate global hypokinesis. Wall thickness was within normal limits. Ejection fraction was estimated at 45-50%. Doppler parameters were consistent with abnormal left ventricular  relaxation (grade 1 diastolic dysfunction). Treatment: labs, review of ECHO, TTE folllowing TAVR, Medication review and   management -Lasix, Eliquis, Norvasc Lopressor  . Thank Janith Kawasaki BSN, RN, CRCR  Clinical   P: [phone]  F: [phone]  Options provided:  -- Chronic Systolic CHF/HFrEF  -- Chronic Systolic and Diastolic  CHF/HFrEF  -- Acute on Chronic Systolic CHF/HFrEF  -- Acute on Chronic Systolic and Diastolic CHF  -- Other - I will add my own diagnosis  -- Disagree - Not applicable / Not valid  -- Disagree - Clinically unable to determine / Unknown  -- Refer to Clinical Documentation Reviewer    PROVIDER RESPONSE TEXT:    This patient is in acute on chronic systolic CHF/HFrEF.     Query created by: Dottie Lopez on 2023 12:05 PM      Electronically signed by:  Velia Zaragoza MD 2023 12:10 PM

## 2023-12-08 ENCOUNTER — TELEPHONE (OUTPATIENT)
Dept: CARDIOLOGY CLINIC | Age: 88
End: 2023-12-08

## 2023-12-08 DIAGNOSIS — I35.0 AORTIC STENOSIS, SEVERE: Primary | ICD-10-CM

## 2023-12-08 NOTE — TELEPHONE ENCOUNTER
Orders received from Dr. Katherine Muñoz to schedule the patient for her 1 year post TAVR follow up appointment with an ECHO, CBC, and BMP. Appointment is scheduled with Henrietta for 12/10/24 at 1300. ECHO scheduled for 12/6/24 at 1100. Appointments will be reviewed with patient at 30 day OV with Dr. Katherine Muñoz. Dr. Katherine Muñoz, please agree.

## 2023-12-11 NOTE — PATIENT INSTRUCTIONS
You may receive a survey regarding the care you received during your visit. Your input is valuable to us. We encourage you to complete and return your survey. We hope you will choose us in the future for your healthcare needs. Thank you for choosing [facility]!     Your Medical Assistant Today:  Linsey MACE     Your Provider for Today: Nava FORBESC  Ph. [phone] opt 1

## 2023-12-12 ENCOUNTER — TELEPHONE (OUTPATIENT)
Dept: CARDIAC REHAB | Age: 88
End: 2023-12-12

## 2023-12-12 ENCOUNTER — OFFICE VISIT (OUTPATIENT)
Dept: CARDIOTHORACIC SURGERY | Age: 88
End: 2023-12-12
Payer: MEDICARE

## 2023-12-12 VITALS
SYSTOLIC BLOOD PRESSURE: 143 MMHG | HEIGHT: 62 IN | OXYGEN SATURATION: 98 % | HEART RATE: 78 BPM | DIASTOLIC BLOOD PRESSURE: 65 MMHG | BODY MASS INDEX: 22.45 KG/M2 | WEIGHT: 122 LBS

## 2023-12-12 DIAGNOSIS — Z95.2 S/P TAVR (TRANSCATHETER AORTIC VALVE REPLACEMENT): Primary | ICD-10-CM

## 2023-12-12 PROCEDURE — 1111F DSCHRG MED/CURRENT MED MERGE: CPT | Performed by: PHYSICIAN ASSISTANT

## 2023-12-12 PROCEDURE — 1090F PRES/ABSN URINE INCON ASSESS: CPT | Performed by: PHYSICIAN ASSISTANT

## 2023-12-12 PROCEDURE — 1123F ACP DISCUSS/DSCN MKR DOCD: CPT | Performed by: PHYSICIAN ASSISTANT

## 2023-12-12 PROCEDURE — 99214 OFFICE O/P EST MOD 30 MIN: CPT | Performed by: PHYSICIAN ASSISTANT

## 2023-12-12 PROCEDURE — G8427 DOCREV CUR MEDS BY ELIG CLIN: HCPCS | Performed by: PHYSICIAN ASSISTANT

## 2023-12-12 PROCEDURE — G8484 FLU IMMUNIZE NO ADMIN: HCPCS | Performed by: PHYSICIAN ASSISTANT

## 2023-12-12 PROCEDURE — 1036F TOBACCO NON-USER: CPT | Performed by: PHYSICIAN ASSISTANT

## 2023-12-12 PROCEDURE — G8420 CALC BMI NORM PARAMETERS: HCPCS | Performed by: PHYSICIAN ASSISTANT

## 2024-01-05 ENCOUNTER — HOSPITAL ENCOUNTER (OUTPATIENT)
Age: 89
End: 2024-01-05
Attending: INTERNAL MEDICINE
Payer: MEDICARE

## 2024-01-05 DIAGNOSIS — I35.0 AORTIC STENOSIS, SEVERE: ICD-10-CM

## 2024-01-05 LAB
ECHO AV AREA PEAK VELOCITY: 2.2 CM2
ECHO AV AREA VTI: 2.7 CM2
ECHO AV MEAN GRADIENT: 3 MMHG
ECHO AV MEAN VELOCITY: 0.9 M/S
ECHO AV PEAK GRADIENT: 5 MMHG
ECHO AV PEAK VELOCITY: 1.1 M/S
ECHO AV VELOCITY RATIO: 0.91
ECHO AV VTI: 20 CM
ECHO EST RA PRESSURE: 5 MMHG
ECHO LA AREA 2C: 12.7 CM2
ECHO LA AREA 4C: 11.7 CM2
ECHO LA DIAMETER: 3.5 CM
ECHO LA MAJOR AXIS: 4.2 CM
ECHO LA MINOR AXIS: 4.7 CM
ECHO LA VOL BP: 29 ML (ref 22–52)
ECHO LA VOL MOD A2C: 28 ML (ref 22–52)
ECHO LA VOL MOD A4C: 26 ML (ref 22–52)
ECHO LV E' LATERAL VELOCITY: 5 CM/S
ECHO LV E' SEPTAL VELOCITY: 5 CM/S
ECHO LV FRACTIONAL SHORTENING: 25 % (ref 28–44)
ECHO LV INTERNAL DIMENSION DIASTOLIC: 4 CM (ref 3.9–5.3)
ECHO LV INTERNAL DIMENSION SYSTOLIC: 3 CM
ECHO LV ISOVOLUMETRIC RELAXATION TIME (IVRT): 60 MS
ECHO LV IVSD: 0.8 CM (ref 0.6–0.9)
ECHO LV MASS 2D: 85.8 G (ref 67–162)
ECHO LV POSTERIOR WALL DIASTOLIC: 0.7 CM (ref 0.6–0.9)
ECHO LV RELATIVE WALL THICKNESS RATIO: 0.35
ECHO LVOT AREA: 2.5 CM2
ECHO LVOT AV VTI INDEX: 1.07
ECHO LVOT DIAM: 1.8 CM
ECHO LVOT MEAN GRADIENT: 3 MMHG
ECHO LVOT PEAK GRADIENT: 4 MMHG
ECHO LVOT PEAK VELOCITY: 1 M/S
ECHO LVOT SV: 54.2 ML
ECHO LVOT VTI: 21.3 CM
ECHO MV E DECELERATION TIME (DT): 170 MS
ECHO MV E VELOCITY: 1.02 M/S
ECHO MV E/E' LATERAL: 20.4
ECHO MV E/E' RATIO (AVERAGED): 20.4
ECHO PV MAX VELOCITY: 1.1 M/S
ECHO PV PEAK GRADIENT: 4 MMHG
ECHO RIGHT VENTRICULAR SYSTOLIC PRESSURE (RVSP): 45 MMHG
ECHO RV INTERNAL DIMENSION: 2.6 CM
ECHO RV TAPSE: 1.3 CM (ref 1.7–?)
ECHO TV REGURGITANT MAX VELOCITY: 3.17 M/S
ECHO TV REGURGITANT PEAK GRADIENT: 40 MMHG

## 2024-01-05 PROCEDURE — 93306 TTE W/DOPPLER COMPLETE: CPT

## 2024-01-05 PROCEDURE — 93306 TTE W/DOPPLER COMPLETE: CPT | Performed by: INTERNAL MEDICINE

## 2024-01-08 ENCOUNTER — OFFICE VISIT (OUTPATIENT)
Dept: CARDIOLOGY CLINIC | Age: 89
End: 2024-01-08

## 2024-01-08 ENCOUNTER — TELEPHONE (OUTPATIENT)
Dept: CARDIOLOGY CLINIC | Age: 89
End: 2024-01-08

## 2024-01-08 ENCOUNTER — NURSE ONLY (OUTPATIENT)
Dept: CARDIOLOGY CLINIC | Age: 89
End: 2024-01-08
Payer: MEDICARE

## 2024-01-08 VITALS
HEART RATE: 82 BPM | BODY MASS INDEX: 22.52 KG/M2 | WEIGHT: 122.4 LBS | HEIGHT: 62 IN | SYSTOLIC BLOOD PRESSURE: 134 MMHG | DIASTOLIC BLOOD PRESSURE: 80 MMHG

## 2024-01-08 DIAGNOSIS — Z95.2 S/P TAVR (TRANSCATHETER AORTIC VALVE REPLACEMENT): Primary | ICD-10-CM

## 2024-01-08 DIAGNOSIS — Z95.0 PACEMAKER: Primary | ICD-10-CM

## 2024-01-08 PROCEDURE — 93280 PM DEVICE PROGR EVAL DUAL: CPT | Performed by: INTERNAL MEDICINE

## 2024-01-08 NOTE — PROGRESS NOTES
DR CARBONE PT   PT IS HERE TO SEE DR CARBONE TODAY   [facility] DUAL PACER CHECK IN OFFICE/ HAS MERLIN    KNOWN AFIB/ ELIQUIS  AFIB BURDEN <1%    AMS EPISODES X 4 OR MODE SWITCH <1%    PRESENTS IN ASVP 85  UNDERLYING DEPENDENT IN VENTRICLES AT 40    DDDR     A PACED 43%  V PACED >99%    P WAVES >5    ATRIAL IMPEDENCE 450  VENT IMPEDENCE 460    ATRIAL THRESHOLD 0.5 @ 0.5  VENT THRESHOLD 0.625 @ 0.5    ATRIAL AND VENT AMPLITUDES AUTO     1 HIGH VENT RATE EPISODE ON 12/6/23  FOR 9 BTS  BPM

## 2024-01-08 NOTE — PROGRESS NOTES
needed for Chest pain up to max of 3 total doses. If no relief after 1 dose, call 911., Disp: 25 tablet, Rfl: 3    ciprofloxacin (CIPRO) 750 MG tablet, Take 1 tablet by mouth 2 times daily On 2 weeks, then off 1 week, repeat (for chronic lung infections) (Patient not taking: Reported on 12/12/2023), Disp: , Rfl:     albuterol-ipratropium (COMBIVENT RESPIMAT)  MCG/ACT AERS inhaler, Inhale 1 puff into the lungs every 6 hours as needed, Disp: 3 Inhaler, Rfl: 3    Multiple Vitamins-Minerals (PRESERVISION AREDS PO), Take by mouth daily, Disp: , Rfl:     Past Medical History  Tamy  has a past medical history of Anxiety, Arthritis, Blood circulation, collateral, Bronchiectasis ([facility]), CAD (coronary artery disease), Chronic obstructive lung disease, Depression, Epigastric discomfort, Exertional dyspnea, GERD (gastroesophageal reflux disease), Hernia, History of blood transfusion, History of pneumonia, History of positive PPD, Irregular heart beat, Joint pain, Pacemaker, Pneumonia, Restless legs syndrome, and SSS (sick sinus syndrome) ([facility]).    Social History  Tamy  reports that she has never smoked. She has never used smokeless tobacco. She reports current alcohol use. She reports that she does not use drugs.    Family History  Tamy family history includes Cancer in her mother, sister, and sister; Other in her father.    There is no family history of bicuspid aortic valve, aneurysms, heart transplant, pacemakers, defibrillators, or sudden cardiac death.      Past Surgical History   Past Surgical History:   Procedure Laterality Date    APPENDECTOMY      BACK SURGERY      CARDIAC PACEMAKER PLACEMENT  2001    CARDIAC PACEMAKER PLACEMENT  4 13 2010    Dual-chamber pulse generator removal. Attempted extraction of atrial and ventricular leads. Insertion of new ventricular lead. Insertion of new dual-chamber pulse generator (atrial lead capped.)     CARDIAC PROCEDURE N/A 11/17/2023    Left and right heart cath /

## 2024-02-02 ENCOUNTER — PROCEDURE VISIT (OUTPATIENT)
Dept: CARDIOLOGY CLINIC | Age: 89
End: 2024-02-02

## 2024-02-02 DIAGNOSIS — Z95.0 PACEMAKER: Primary | ICD-10-CM

## 2024-02-02 NOTE — PROGRESS NOTES
DR CARBONE PT   MERLIN [facility] PJ DUAL PACER REMOTE   BATTERY 4.3 YRS REMAINING      DDDR     A PACED 36%  V PACED >99%    P WAVES 4.2  RV WAVES NOT OBTAINED PER THE DEVICE    ATRIAL IMPEDENCE 430  VENT IMPEDENCE 340    ATRIAL THRESHOLD 0.5 @ 0.5  VENT THRESHOLD 0.625 @ 0.5    ATRIAL AND VENT AMPLITUDES AUTO     KNOWN AFIB / ELIQUIS   AFIB BURDEN <1% SINCE 11/25/23      AMS EPISODES X 5  MODE SWITCH <1%

## 2024-03-13 ENCOUNTER — TELEPHONE (OUTPATIENT)
Dept: CARDIOLOGY CLINIC | Age: 89
End: 2024-03-13

## 2024-03-13 NOTE — TELEPHONE ENCOUNTER
This patient has completed 3 months of DAPT post TAVR (12/6/2023). According to the guidelines Plavix is only recommended for 3 months post implant. After reviewing the chart it does appear that the patient had a PCI in 2018 to the RCA and LAD which is an indication to continue Plavix. Dr. Tripathi are you ok Plavix 75mg daily?

## 2024-03-14 NOTE — TELEPHONE ENCOUNTER
Called to discuss.  No answer at this time.  [facility] to call our office back.

## 2024-03-15 NOTE — TELEPHONE ENCOUNTER
Received call back from patient.  Discussed continuing Plavix and Eliquis.  She states she is doing very well.  Urged to call us with any concerns.

## 2024-05-10 ENCOUNTER — PROCEDURE VISIT (OUTPATIENT)
Dept: CARDIOLOGY CLINIC | Age: 89
End: 2024-05-10

## 2024-05-10 DIAGNOSIS — Z95.0 PACEMAKER: Primary | ICD-10-CM

## 2024-05-10 NOTE — PROGRESS NOTES
Dr lee pt   Merlin [facility] dual pacer remote   Battery 4 yrs remaining      Dddr     A paced <1%  V paced <1%  Ams episodes x 11    P waves 4.5  Rv waves not measured per the device     Atrial impedence 410  Vent impedence 380    Atrial threshold 0.5 @ 0.5  Vent threshold 0.5 @ 0.5    Atrial and vent amplitudes auto

## 2024-07-08 ENCOUNTER — TELEPHONE (OUTPATIENT)
Dept: CARDIOLOGY CLINIC | Age: 89
End: 2024-07-08

## 2024-07-08 NOTE — TELEPHONE ENCOUNTER
Pre op Risk Assessment    Procedure CT Myelogram  Physician OIO  Date of surgery/procedure TBD    Last OV 01/08/2024  Last Stress 06/13/2014  Last Echo 01/05/2024  Last Cath 02/20/2019  Last Stent 02/20/2019  Is patient on blood thinners Eliquis and Plavix  Hold Meds/how many days Plavix for 5 days    Fax: [phone]

## 2024-08-16 ENCOUNTER — PROCEDURE VISIT (OUTPATIENT)
Dept: CARDIOLOGY CLINIC | Age: 89
End: 2024-08-16

## 2024-08-16 DIAGNOSIS — Z95.0 PACEMAKER: Primary | ICD-10-CM

## 2024-08-16 NOTE — PROGRESS NOTES
Dr lee pt   Merlin [facility] dual pacer remote   Battery 3.8 yrs remaining    Dddr     A paced 60%  V paced >99%    P waves 4  Rv waves >12    Atrial impedence 450  Vent impedence 450    Atrial threshold 0.625 @ 0.5  Vent threshold 0.625 @ 0.5    Atrial and vent amplitudes auto  Afib burden 0%    Known afib/ eliquis

## 2024-11-11 ENCOUNTER — TELEPHONE (OUTPATIENT)
Dept: CARDIOLOGY CLINIC | Age: 89
End: 2024-11-11

## 2024-11-16 NOTE — PROGRESS NOTES
Reviewed. Continue to monitor. 

## 2024-11-29 PROCEDURE — 93296 REM INTERROG EVL PM/IDS: CPT | Performed by: INTERNAL MEDICINE

## 2024-11-29 PROCEDURE — 93294 REM INTERROG EVL PM/LDLS PM: CPT | Performed by: INTERNAL MEDICINE

## 2024-12-06 ENCOUNTER — HOSPITAL ENCOUNTER (OUTPATIENT)
Age: 88
Discharge: HOME | End: 2024-12-08
Attending: INTERNAL MEDICINE
Payer: MEDICARE

## 2024-12-06 DIAGNOSIS — I35.0 AORTIC STENOSIS, SEVERE: ICD-10-CM

## 2024-12-06 LAB
ECHO AO ASC DIAM: 2.9 CM
ECHO AV AREA PEAK VELOCITY: 2 CM2
ECHO AV AREA VTI: 2 CM2
ECHO AV MEAN GRADIENT: 5 MMHG
ECHO AV MEAN VELOCITY: 1 M/S
ECHO AV PEAK GRADIENT: 8 MMHG
ECHO AV PEAK VELOCITY: 1.4 M/S
ECHO AV VELOCITY RATIO: 0.71
ECHO AV VTI: 28.4 CM
ECHO EST RA PRESSURE: 3 MMHG
ECHO LA AREA 2C: 12.3 CM2
ECHO LA AREA 4C: 14.3 CM2
ECHO LA DIAMETER: 3.5 CM
ECHO LA MAJOR AXIS: 4.9 CM
ECHO LA MINOR AXIS: 4.6 CM
ECHO LA VOL BP: 31 ML (ref 22–52)
ECHO LA VOL MOD A2C: 27 ML (ref 22–52)
ECHO LA VOL MOD A4C: 34 ML (ref 22–52)
ECHO LV E' LATERAL VELOCITY: 6.9 CM/S
ECHO LV E' SEPTAL VELOCITY: 5.8 CM/S
ECHO LV EJECTION FRACTION BIPLANE: 55 % (ref 55–100)
ECHO LV FRACTIONAL SHORTENING: 31 % (ref 28–44)
ECHO LV INTERNAL DIMENSION DIASTOLIC: 2.9 CM (ref 3.9–5.3)
ECHO LV INTERNAL DIMENSION SYSTOLIC: 2 CM
ECHO LV ISOVOLUMETRIC RELAXATION TIME (IVRT): 95 MS
ECHO LV IVSD: 1.4 CM (ref 0.6–0.9)
ECHO LV MASS 2D: 104.2 G (ref 67–162)
ECHO LV POSTERIOR WALL DIASTOLIC: 1 CM (ref 0.6–0.9)
ECHO LV RELATIVE WALL THICKNESS RATIO: 0.69
ECHO LVOT AREA: 2.8 CM2
ECHO LVOT AV VTI INDEX: 0.72
ECHO LVOT DIAM: 1.9 CM
ECHO LVOT MEAN GRADIENT: 2 MMHG
ECHO LVOT PEAK GRADIENT: 4 MMHG
ECHO LVOT PEAK VELOCITY: 1 M/S
ECHO LVOT SV: 57.8 ML
ECHO LVOT VTI: 20.4 CM
ECHO MV A VELOCITY: 1.43 M/S
ECHO MV AREA VTI: 1.6 CM2
ECHO MV E DECELERATION TIME (DT): 183 MS
ECHO MV E VELOCITY: 1.05 M/S
ECHO MV E/A RATIO: 0.73
ECHO MV E/E' LATERAL: 15.22
ECHO MV E/E' RATIO (AVERAGED): 16.66
ECHO MV E/E' SEPTAL: 18.1
ECHO MV LVOT VTI INDEX: 1.82
ECHO MV MAX VELOCITY: 1.8 M/S
ECHO MV MEAN GRADIENT: 5 MMHG
ECHO MV MEAN VELOCITY: 1 M/S
ECHO MV PEAK GRADIENT: 13 MMHG
ECHO MV REGURGITANT PEAK GRADIENT: 55 MMHG
ECHO MV REGURGITANT PEAK VELOCITY: 3.7 M/S
ECHO MV VTI: 37.2 CM
ECHO PV MAX VELOCITY: 0.7 M/S
ECHO PV PEAK GRADIENT: 2 MMHG
ECHO RIGHT VENTRICULAR SYSTOLIC PRESSURE (RVSP): 41 MMHG
ECHO RV FREE WALL PEAK S': 13.4 CM/S
ECHO RV INTERNAL DIMENSION: 2.9 CM
ECHO RV TAPSE: 2 CM (ref 1.7–?)
ECHO TV E WAVE: 0.5 M/S
ECHO TV REGURGITANT MAX VELOCITY: 3.08 M/S
ECHO TV REGURGITANT PEAK GRADIENT: 38 MMHG

## 2024-12-06 PROCEDURE — 93306 TTE W/DOPPLER COMPLETE: CPT

## 2024-12-06 PROCEDURE — 93306 TTE W/DOPPLER COMPLETE: CPT | Performed by: INTERNAL MEDICINE

## 2024-12-10 ENCOUNTER — NURSE ONLY (OUTPATIENT)
Dept: CARDIOLOGY CLINIC | Age: 88
End: 2024-12-10

## 2024-12-10 ENCOUNTER — OFFICE VISIT (OUTPATIENT)
Dept: CARDIOLOGY CLINIC | Age: 88
End: 2024-12-10
Payer: MEDICARE

## 2024-12-10 ENCOUNTER — TELEPHONE (OUTPATIENT)
Dept: CARDIOLOGY CLINIC | Age: 88
End: 2024-12-10

## 2024-12-10 VITALS
WEIGHT: 114.8 LBS | SYSTOLIC BLOOD PRESSURE: 144 MMHG | HEART RATE: 88 BPM | BODY MASS INDEX: 21.12 KG/M2 | HEIGHT: 62 IN | DIASTOLIC BLOOD PRESSURE: 62 MMHG

## 2024-12-10 DIAGNOSIS — I48.92 ATRIAL FLUTTER, PAROXYSMAL (HCC): ICD-10-CM

## 2024-12-10 DIAGNOSIS — Z95.0 PACEMAKER: Primary | ICD-10-CM

## 2024-12-10 DIAGNOSIS — I42.8 NONISCHEMIC CARDIOMYOPATHY (HCC): ICD-10-CM

## 2024-12-10 DIAGNOSIS — Z95.0 PACEMAKER: ICD-10-CM

## 2024-12-10 DIAGNOSIS — I50.22 CHRONIC SYSTOLIC CONGESTIVE HEART FAILURE, NYHA CLASS 2 (HCC): ICD-10-CM

## 2024-12-10 DIAGNOSIS — Z95.2 S/P TAVR (TRANSCATHETER AORTIC VALVE REPLACEMENT): Primary | ICD-10-CM

## 2024-12-10 PROCEDURE — 1090F PRES/ABSN URINE INCON ASSESS: CPT | Performed by: NURSE PRACTITIONER

## 2024-12-10 PROCEDURE — 99214 OFFICE O/P EST MOD 30 MIN: CPT | Performed by: NURSE PRACTITIONER

## 2024-12-10 PROCEDURE — 1159F MED LIST DOCD IN RCRD: CPT | Performed by: NURSE PRACTITIONER

## 2024-12-10 PROCEDURE — 1123F ACP DISCUSS/DSCN MKR DOCD: CPT | Performed by: NURSE PRACTITIONER

## 2024-12-10 PROCEDURE — G8427 DOCREV CUR MEDS BY ELIG CLIN: HCPCS | Performed by: NURSE PRACTITIONER

## 2024-12-10 PROCEDURE — G8420 CALC BMI NORM PARAMETERS: HCPCS | Performed by: NURSE PRACTITIONER

## 2024-12-10 PROCEDURE — 1160F RVW MEDS BY RX/DR IN RCRD: CPT | Performed by: NURSE PRACTITIONER

## 2024-12-10 PROCEDURE — G8484 FLU IMMUNIZE NO ADMIN: HCPCS | Performed by: NURSE PRACTITIONER

## 2024-12-10 PROCEDURE — 1036F TOBACCO NON-USER: CPT | Performed by: NURSE PRACTITIONER

## 2024-12-10 RX ORDER — FLUTICASONE FUROATE, UMECLIDINIUM BROMIDE AND VILANTEROL TRIFENATATE 100; 62.5; 25 UG/1; UG/1; UG/1
POWDER RESPIRATORY (INHALATION)
COMMUNITY
Start: 2024-11-04

## 2024-12-10 NOTE — PROGRESS NOTES
Tamy Neves (:  1935) is a 89 y.o. female, Established patient, here for 1 year TAVR evaluation .    Follow-up (1 yr post TAVR)    Primary Cardiologist: Akil Tripathi MD    HPI:  Last seen in office on 2024 per Dr. Tripathi. Per office note:  Assessment/Plan   S/p TAVR - 30 day f/u, NYHA I  Aflutter  PCI 2018 - RCA/LAD  Preserved EF  Doing well, no issues, EF has improved, no CHF.  Plavix/Eliquis, no bleeding.  Discussed symptoms needing emergency care or those which require further medical attention.   Discussed diet/exercise/BP/weight loss/health lifestyle choices/lipids; the patient understands the goals and will try to comply.   Disposition:  1 year     Assessment & Plan  1 year post TAVR - NYHA I - II  Doing well. Denies chest discomfort or dyspnea. Breathing stable. No lightheadedness or dizziness; no syncope or near syncope. No heart racing or palpitations. No swelling or water retention issues. Tolerating medications. No bleeding on Plavix and Eliquis. ADL without issue.   1 year post TAVR echo noted pEF; no AS or leak with device in good position.       PLAN/patient instructions:   Continue current medications as prescribed.    Stay as active as you can.     Eat heart healthy diet.     Follow-up with your PCP as scheduled.    Follow-up with Dr. Tripathi or Henrietta PAK in 1 year as scheduled or sooner if need.     Results  [id number]      Echo: 2024; 1 year post TAVR   Reading Physicians  Performing Staff   Cardiology: Akil Tripathi MD    Tech/Nurse: Verónica Mehta MANUEL        Reason for Exam  Priority: Routine  Heart Valve Replacement   Dx: Aortic stenosis, severe [I35.0 (ICD-10-CM)]     PACS Images     Show images for Echo (TTE) complete (PRN contrast/bubble/strain/3D)  Interpretation Summary  Show Result Comparison     Left Ventricle: Normal left ventricular systolic function with a visually estimated EF of 55 - 60%. Left ventricle size is normal. Normal wall thickness.

## 2024-12-10 NOTE — PROGRESS NOTES
1 year f/u post TAVR    EKG 1-8-24    C/o sob, pain around pacemaker, dizziness    Denies cp, BLE    Would like to discuss blood thinners

## 2024-12-10 NOTE — PATIENT INSTRUCTIONS
Continue current medications as prescribed.    Stay as active as you can.     Eat heart healthy diet.     Follow-up with your PCP as scheduled.    Follow-up with Dr. Tripathi or Henrietta PAK in 1 year as scheduled or sooner if need.

## 2025-02-28 PROCEDURE — 93294 REM INTERROG EVL PM/LDLS PM: CPT | Performed by: INTERNAL MEDICINE

## 2025-02-28 PROCEDURE — 93296 REM INTERROG EVL PM/IDS: CPT | Performed by: INTERNAL MEDICINE

## 2025-06-04 PROCEDURE — 93296 REM INTERROG EVL PM/IDS: CPT | Performed by: INTERNAL MEDICINE

## 2025-06-04 PROCEDURE — 93294 REM INTERROG EVL PM/LDLS PM: CPT | Performed by: INTERNAL MEDICINE
